# Patient Record
Sex: FEMALE | Race: BLACK OR AFRICAN AMERICAN | URBAN - METROPOLITAN AREA
[De-identification: names, ages, dates, MRNs, and addresses within clinical notes are randomized per-mention and may not be internally consistent; named-entity substitution may affect disease eponyms.]

---

## 2017-01-16 ENCOUNTER — APPOINTMENT (RX ONLY)
Dept: URBAN - METROPOLITAN AREA CLINIC 7 | Facility: CLINIC | Age: 37
Setting detail: DERMATOLOGY
End: 2017-01-16

## 2017-01-16 DIAGNOSIS — Z41.9 ENCOUNTER FOR PROCEDURE FOR PURPOSES OTHER THAN REMEDYING HEALTH STATE, UNSPECIFIED: ICD-10-CM

## 2017-01-16 PROCEDURE — ? SCITON

## 2017-01-16 PROCEDURE — ? MEDICAL CONSULTATION: LHR

## 2017-01-16 ASSESSMENT — LOCATION SIMPLE DESCRIPTION DERM: LOCATION SIMPLE: RIGHT AXILLARY VAULT

## 2017-01-16 ASSESSMENT — LOCATION ZONE DERM: LOCATION ZONE: AXILLAE

## 2017-01-16 ASSESSMENT — LOCATION DETAILED DESCRIPTION DERM: LOCATION DETAILED: RIGHT AXILLARY VAULT

## 2017-01-16 NOTE — PROCEDURE: SCITON
Lapg Scan Area (Mm2): 0
Post-Care Instructions: I reviewed with the patient in detail post-care instructions. Patient should stay away from the sun and wear sun protection until treated areas are fully healed.
Detail Level: Zone
Pulse Duration Units: milliseconds
Cooling (In C): 10
Treatment Number: 1
Pulse Duration: 45
Consent: Written consent obtained, risks reviewed including but not limited to crusting, scabbing, blistering, scarring, darker or lighter pigmentary change, bruising, and/or incomplete response.
Cooling ?: Yes
Prep Text: The patient's skin was prepped in the usual manner.
Fluence (J/Cm2): 50

## 2017-03-03 ENCOUNTER — APPOINTMENT (RX ONLY)
Dept: URBAN - METROPOLITAN AREA CLINIC 7 | Facility: CLINIC | Age: 37
Setting detail: DERMATOLOGY
End: 2017-03-03

## 2017-03-03 DIAGNOSIS — L70.8 OTHER ACNE: ICD-10-CM

## 2017-03-03 DIAGNOSIS — Z41.9 ENCOUNTER FOR PROCEDURE FOR PURPOSES OTHER THAN REMEDYING HEALTH STATE, UNSPECIFIED: ICD-10-CM

## 2017-03-03 DIAGNOSIS — L82.1 OTHER SEBORRHEIC KERATOSIS: ICD-10-CM

## 2017-03-03 PROBLEM — L70.0 ACNE VULGARIS: Status: ACTIVE | Noted: 2017-03-03

## 2017-03-03 PROBLEM — L20.84 INTRINSIC (ALLERGIC) ECZEMA: Status: ACTIVE | Noted: 2017-03-03

## 2017-03-03 PROBLEM — L85.3 XEROSIS CUTIS: Status: ACTIVE | Noted: 2017-03-03

## 2017-03-03 PROCEDURE — ? COUNSELING

## 2017-03-03 PROCEDURE — ? TREATMENT REGIMEN

## 2017-03-03 PROCEDURE — ? SCITON

## 2017-03-03 PROCEDURE — 99202 OFFICE O/P NEW SF 15 MIN: CPT

## 2017-03-03 PROCEDURE — ? OTHER

## 2017-03-03 PROCEDURE — ? PRESCRIPTION

## 2017-03-03 RX ORDER — TRETINOIN 0.5 MG/G
1 CREAM TOPICAL QHS
Qty: 1 | Refills: 3 | Status: ERX | COMMUNITY
Start: 2017-03-03

## 2017-03-03 RX ORDER — DAPSONE 50 MG/G
1 GEL TOPICAL QAM
Qty: 1 | Refills: 3 | Status: ERX | COMMUNITY
Start: 2017-03-03

## 2017-03-03 RX ADMIN — DAPSONE 1: 50 GEL TOPICAL at 22:59

## 2017-03-03 RX ADMIN — TRETINOIN 1: 0.5 CREAM TOPICAL at 22:29

## 2017-03-03 ASSESSMENT — LOCATION SIMPLE DESCRIPTION DERM
LOCATION SIMPLE: LEFT ANTERIOR NECK
LOCATION SIMPLE: RIGHT ANTERIOR NECK
LOCATION SIMPLE: RIGHT CHEEK
LOCATION SIMPLE: RIGHT AXILLARY VAULT
LOCATION SIMPLE: CHEST
LOCATION SIMPLE: LEFT CHEEK

## 2017-03-03 ASSESSMENT — LOCATION ZONE DERM
LOCATION ZONE: TRUNK
LOCATION ZONE: AXILLAE
LOCATION ZONE: NECK
LOCATION ZONE: FACE

## 2017-03-03 ASSESSMENT — LOCATION DETAILED DESCRIPTION DERM
LOCATION DETAILED: LEFT SUPERIOR MEDIAL BUCCAL CHEEK
LOCATION DETAILED: RIGHT SUPERIOR LATERAL BUCCAL CHEEK
LOCATION DETAILED: MIDDLE STERNUM
LOCATION DETAILED: RIGHT INFERIOR LATERAL NECK
LOCATION DETAILED: LEFT CLAVICULAR NECK
LOCATION DETAILED: RIGHT AXILLARY VAULT

## 2017-03-03 NOTE — PROCEDURE: SCITON
Power (W): 0
Detail Level: Zone
Pulse Duration Units: milliseconds
Treatment Number: 2
Pulse Duration: 45
Cooling (In C): 10
Consent: Written consent obtained, risks reviewed including but not limited to crusting, scabbing, blistering, scarring, darker or lighter pigmentary change, bruising, and/or incomplete response.
Passes (Optional): 1
Fluence (J/Cm2): 50
Cooling ?: Yes
Prep Text: The patient's skin was prepped in the usual manner.
Post-Care Instructions: I reviewed with the patient in detail post-care instructions. Patient should stay away from the sun and wear sun protection until treated areas are fully healed.

## 2017-03-03 NOTE — PROCEDURE: TREATMENT REGIMEN
Detail Level: Zone
Plan: Patient is to speak with OBGYN about OCP and we may consider spironolactone in the future
Otc Regimen: Panoxyl 4% face wash
Initiate Treatment: Aczone QAM and tretinoin QHS

## 2017-05-17 ENCOUNTER — APPOINTMENT (RX ONLY)
Dept: URBAN - METROPOLITAN AREA CLINIC 7 | Facility: CLINIC | Age: 37
Setting detail: DERMATOLOGY
End: 2017-05-17

## 2017-05-17 DIAGNOSIS — Z41.9 ENCOUNTER FOR PROCEDURE FOR PURPOSES OTHER THAN REMEDYING HEALTH STATE, UNSPECIFIED: ICD-10-CM

## 2017-05-17 PROCEDURE — ? SCITON

## 2017-05-17 ASSESSMENT — LOCATION DETAILED DESCRIPTION DERM: LOCATION DETAILED: RIGHT AXILLARY VAULT

## 2017-05-17 ASSESSMENT — LOCATION ZONE DERM: LOCATION ZONE: AXILLAE

## 2017-05-17 ASSESSMENT — LOCATION SIMPLE DESCRIPTION DERM: LOCATION SIMPLE: RIGHT AXILLARY VAULT

## 2017-05-17 NOTE — PROCEDURE: SCITON
Consent: Written consent obtained, risks reviewed including but not limited to crusting, scabbing, blistering, scarring, darker or lighter pigmentary change, bruising, and/or incomplete response.
Treatment Number: 2
Detail Level: Zone
Lapg Scan Area (Mm2): 0
Passes (Optional): 1
Cooling (In C): 10
Post-Care Instructions: I reviewed with the patient in detail post-care instructions. Patient should stay away from the sun and wear sun protection until treated areas are fully healed.
Cooling ?: Yes
Prep Text: The patient's skin was prepped in the usual manner.
Pulse Duration Units: milliseconds
Fluence (J/Cm2): 50
Pulse Duration: 40

## 2017-12-11 ENCOUNTER — TELEPHONE (OUTPATIENT)
Dept: OBSTETRICS AND GYNECOLOGY | Facility: CLINIC | Age: 37
End: 2017-12-11

## 2017-12-11 NOTE — TELEPHONE ENCOUNTER
Returned the pt's call to the clinic. Informed the pt that Dr. Perry doesn't have any appointments available for the month of December and that I see that she has an appointment scheduled with Dr. Sagastume on 12/22. Inquired if the pt would like to just keep the appointment with Dr. Sagastume since she will be able to see him sooner. Pt informed me that she would like to keep the appointment with Dr. Sagastume. Verbalized understanding with the pt. Call ended.

## 2017-12-11 NOTE — TELEPHONE ENCOUNTER
----- Message from Komal Alexander sent at 12/11/2017  8:43 AM CST -----  X_  1st Request  _  2nd Request  _  3rd Request        Who:DOUGLAS RUSSELL [[id number]]     Why: New pt Requesting a call back in regards to getting an appt for fibroids. Pt will be in town on 12/21 and would like to have an appt sometime that week if possible. First available appt not until 1/12. Please call to discuss.    What Number to Call Back:[phone]    When to Expect a call back: (Within 24 hours)    Please return the call at earliest convenience. Thanks!

## 2018-01-22 ENCOUNTER — TELEPHONE (OUTPATIENT)
Dept: OBSTETRICS AND GYNECOLOGY | Facility: CLINIC | Age: 38
End: 2018-01-22

## 2018-01-22 NOTE — TELEPHONE ENCOUNTER
Returned the pt's call to the clinic. Pt stated that she was inquiring about another appointment date but that she didn't want to cancel her scheduled appointment. Informed the pt that 2/1 at 10AM is no longer available and that she can be scheduled on 2/7 at 9:45AM. Pt agreed to the appointment time and inquired if she can be placed on the waiting list for a sooner appointment. Informed the pt that she is on the waiting list for a sooner appointment and that a letter reminder will be sent out. Pt verbalized understanding. Letter sent out.

## 2018-01-22 NOTE — TELEPHONE ENCOUNTER
----- Message from Emma Wright sent at 1/22/2018  3:57 PM CST -----  Contact: self  Patient is requesting a call back. Patient states she did NOT cancel her appointment on 2/1 and is requesting to be seen. Patient denies calling to cancel. Patient can be reached at [phone].

## 2018-02-07 ENCOUNTER — OFFICE VISIT (OUTPATIENT)
Dept: OBSTETRICS AND GYNECOLOGY | Facility: CLINIC | Age: 38
End: 2018-02-07
Attending: OBSTETRICS & GYNECOLOGY
Payer: COMMERCIAL

## 2018-02-07 ENCOUNTER — LAB VISIT (OUTPATIENT)
Dept: LAB | Facility: OTHER | Age: 38
End: 2018-02-07
Attending: OBSTETRICS & GYNECOLOGY
Payer: COMMERCIAL

## 2018-02-07 VITALS
HEIGHT: 67 IN | DIASTOLIC BLOOD PRESSURE: 72 MMHG | SYSTOLIC BLOOD PRESSURE: 124 MMHG | BODY MASS INDEX: 23.29 KG/M2 | WEIGHT: 148.38 LBS

## 2018-02-07 DIAGNOSIS — R30.0 DYSURIA: ICD-10-CM

## 2018-02-07 DIAGNOSIS — N92.0 MENORRHAGIA WITH REGULAR CYCLE: ICD-10-CM

## 2018-02-07 DIAGNOSIS — D50.0 IRON DEFICIENCY ANEMIA DUE TO CHRONIC BLOOD LOSS: ICD-10-CM

## 2018-02-07 DIAGNOSIS — D25.1 INTRAMURAL AND SUBSEROUS LEIOMYOMA OF UTERUS: Primary | ICD-10-CM

## 2018-02-07 DIAGNOSIS — D25.2 INTRAMURAL AND SUBSEROUS LEIOMYOMA OF UTERUS: Primary | ICD-10-CM

## 2018-02-07 LAB
BACTERIA #/AREA URNS AUTO: NORMAL /HPF
BASOPHILS # BLD AUTO: 0.01 K/UL
BASOPHILS NFR BLD: 0.2 %
BILIRUB UR QL STRIP: NEGATIVE
CAOX CRY UR QL COMP ASSIST: NORMAL
CLARITY UR REFRACT.AUTO: ABNORMAL
COLOR UR AUTO: YELLOW
DIFFERENTIAL METHOD: ABNORMAL
EOSINOPHIL # BLD AUTO: 0 K/UL
EOSINOPHIL NFR BLD: 1 %
ERYTHROCYTE [DISTWIDTH] IN BLOOD BY AUTOMATED COUNT: 20.1 %
GLUCOSE UR QL STRIP: NEGATIVE
HCT VFR BLD AUTO: 34.9 %
HGB BLD-MCNC: 11.1 G/DL
HGB UR QL STRIP: NEGATIVE
HYALINE CASTS UR QL AUTO: 0 /LPF
KETONES UR QL STRIP: NEGATIVE
LEUKOCYTE ESTERASE UR QL STRIP: NEGATIVE
LYMPHOCYTES # BLD AUTO: 2 K/UL
LYMPHOCYTES NFR BLD: 48.3 %
MCH RBC QN AUTO: 26.7 PG
MCHC RBC AUTO-ENTMCNC: 31.8 G/DL
MCV RBC AUTO: 84 FL
MICROSCOPIC COMMENT: NORMAL
MONOCYTES # BLD AUTO: 0.5 K/UL
MONOCYTES NFR BLD: 10.8 %
NEUTROPHILS # BLD AUTO: 1.7 K/UL
NEUTROPHILS NFR BLD: 39.7 %
NITRITE UR QL STRIP: NEGATIVE
PH UR STRIP: 5 [PH] (ref 5–8)
PLATELET # BLD AUTO: 300 K/UL
PMV BLD AUTO: 10.2 FL
PROT UR QL STRIP: ABNORMAL
RBC # BLD AUTO: 4.15 M/UL
RBC #/AREA URNS AUTO: 0 /HPF (ref 0–4)
SP GR UR STRIP: 1.02 (ref 1–1.03)
SQUAMOUS #/AREA URNS AUTO: 0 /HPF
TSH SERPL DL<=0.005 MIU/L-ACNC: 2.78 UIU/ML
URATE CRY UR QL COMP ASSIST: NORMAL
URN SPEC COLLECT METH UR: ABNORMAL
UROBILINOGEN UR STRIP-ACNC: NEGATIVE EU/DL
WBC # BLD AUTO: 4.18 K/UL
WBC #/AREA URNS AUTO: 1 /HPF (ref 0–5)

## 2018-02-07 PROCEDURE — 85025 COMPLETE CBC W/AUTO DIFF WBC: CPT

## 2018-02-07 PROCEDURE — 36415 COLL VENOUS BLD VENIPUNCTURE: CPT

## 2018-02-07 PROCEDURE — 99204 OFFICE O/P NEW MOD 45 MIN: CPT | Mod: S$GLB,,, | Performed by: OBSTETRICS & GYNECOLOGY

## 2018-02-07 PROCEDURE — 81001 URINALYSIS AUTO W/SCOPE: CPT

## 2018-02-07 PROCEDURE — 99999 PR PBB SHADOW E&M-EST. PATIENT-LVL III: CPT | Mod: PBBFAC,,, | Performed by: OBSTETRICS & GYNECOLOGY

## 2018-02-07 PROCEDURE — 84443 ASSAY THYROID STIM HORMONE: CPT

## 2018-02-07 PROCEDURE — 87086 URINE CULTURE/COLONY COUNT: CPT

## 2018-02-07 PROCEDURE — 3008F BODY MASS INDEX DOCD: CPT | Mod: S$GLB,,, | Performed by: OBSTETRICS & GYNECOLOGY

## 2018-02-07 NOTE — PROGRESS NOTES
"CC: Symptoms related to fibroids    Kathe Hudson is a 37 y.o. female  presents for a consultation for management of fibroids.      Patient was found to have a fibroid on ultrasound after being unable to lose weight in her abdomen.  She reports cycles are heavy on the first 2 days.  She uses pads and has to change them every hour.  She reports clots as well.  She is anemic and is currently taking iron once a day.  She reports moderate to severe pain with menses.  She reports constipation as well.       Records from previous physician have been sent but not received.     Patient desires future fertility.    History reviewed. No pertinent past medical history.    History reviewed. No pertinent surgical history.    Family History   Problem Relation Age of Onset    Breast cancer Maternal Grandmother     Colon cancer Maternal Grandmother     Ovarian cancer Neg Hx        Social History   Substance Use Topics    Smoking status: Never Smoker    Smokeless tobacco: Never Used    Alcohol use No       OB History    Para Term  AB Living   0 0 0 0 0 0   SAB TAB Ectopic Multiple Live Births   0 0 0 0 0             /72 (BP Location: Right arm, Patient Position: Sitting, BP Method: Large (Manual))   Ht 5' 7" (1.702 m)   Wt 67.3 kg (148 lb 5.9 oz)   LMP 2018 (Exact Date)   BMI 23.24 kg/m²     ROS:  GENERAL: Denies weight gain or weight loss. Feeling well overall.   SKIN: Denies rash or lesions.   HEAD: Denies head injury or headache.   NODES: Denies enlarged lymph nodes.   CHEST: Denies chest pain or shortness of breath.   CARDIOVASCULAR: Denies palpitations or left sided chest pain.   ABDOMEN: No abdominal pain, constipation, diarrhea, nausea, vomiting or rectal bleeding.   URINARY: No frequency, dysuria, hematuria, or burning on urination.  REPRODUCTIVE: See HPI.   HEMATOLOGIC: No easy bruisability or excessive bleeding with the exception of menstrual cycles.  MUSCULOSKELETAL: " Denies joint pain or swelling.   NEUROLOGIC: Denies syncope or weakness.   PSYCHIATRIC: Denies depression, anxiety or mood swings.    PHYSICAL EXAM:    APPEARANCE: Well nourished, well developed, in no acute distress.  AFFECT: WNL, alert and oriented x 3  SKIN: No acne or hirsutism  NECK: Neck symmetric without masses or thyromegaly  NODES: No inguinal, cervical, axillary, or femoral lymph node enlargement  CHEST: Good respiratory effect  ABDOMEN: Soft.  No tenderness or masses.  No hepatosplenomegaly.  No hernias.  BREASTS: Symmetrical, no skin changes or visible lesions.  No palpable masses, nipple discharge bilaterally.  PELVIC: Normal external genitalia without lesions.  Normal hair distribution.  Adequate perineal body, normal urethral meatus.  Vagina moist and well rugated without lesions or discharge.  Cervix pink, without lesions, discharge or tenderness.  No significant cystocele or rectocele.  Bimanual exam shows uterus to be normal size, regular, mobile and nontender.  Adnexa without masses or tenderness.    EXTREMITIES: No edema.      ICD-10-CM ICD-9-CM    1. Intramural and subserous leiomyoma of uterus D25.1 218.1 MRI Pelvis W WO Contrast    D25.2 218.2    2. Menorrhagia with regular cycle N92.0 626.2 TSH      CBC auto differential   3. Dysuria R30.0 788.1 Urine culture      Urinalysis   4. Iron deficiency anemia due to chronic blood loss D50.0 280.0          Patient was counseled today on treatment options (risks/benefits) for fibroids including low dose oral contraceptive pills, Lysteda, DepoLupron, UFE, myomectomy, and hysterectomy.  Patient desires a myomectomy.  Discussed the routes - robotic assisted laparoscopic vs. Abdominal.  Discussed the benefits and limitations of a minimally invasive approach.  MRI ordered to determine if patient is a candidate.      Labs ordered to determine status of anemia.

## 2018-02-08 ENCOUNTER — PATIENT MESSAGE (OUTPATIENT)
Dept: OBSTETRICS AND GYNECOLOGY | Facility: CLINIC | Age: 38
End: 2018-02-08

## 2018-02-09 LAB — BACTERIA UR CULT: NO GROWTH

## 2018-02-14 ENCOUNTER — TELEPHONE (OUTPATIENT)
Dept: OBSTETRICS AND GYNECOLOGY | Facility: CLINIC | Age: 38
End: 2018-02-14

## 2018-02-14 DIAGNOSIS — N92.0 MENORRHAGIA: ICD-10-CM

## 2018-02-14 DIAGNOSIS — N92.1 MENORRHAGIA WITH IRREGULAR CYCLE: Primary | ICD-10-CM

## 2018-02-14 RX ORDER — SODIUM CHLORIDE 9 MG/ML
INJECTION, SOLUTION INTRAVENOUS CONTINUOUS
Status: CANCELLED | OUTPATIENT
Start: 2018-02-14

## 2018-02-14 NOTE — TELEPHONE ENCOUNTER
Returned the pt's call to the clinic. No answer, left VM message for the pt to call the clinic back.

## 2018-02-14 NOTE — TELEPHONE ENCOUNTER
----- Message from Mary Alexander sent at 2/14/2018  8:44 AM CST -----  Contact: DOUGLAS RUSSELL [[id number]]  x_  1st Request  _  2nd Request  _  3rd Request        Who: DOUGLAS RUSSELL [[id number]]    Why: Requesting a call back in regards to a call from the office, please call her back.    What Number to Call Back: [phone]    When to Expect a call back: (Within 24 hours)    Please return the call at earliest convenience. Thanks!

## 2018-02-14 NOTE — TELEPHONE ENCOUNTER
Returned the pt's call to the clinic. Pt inquiring if February 19th still works as a date for her Sx. Pt stated that she can have the procedure done on 2/19. Informed the pt that a message will be sent to Dr. Perry to inquire if the date is still available and that she will be contacted once Dr. Perry responds. Pt verbalized understanding.        Taylor was already contacted in Sx scheduling about you following yourself on 2/19 and stated that you just need to place the orders.

## 2018-02-14 NOTE — TELEPHONE ENCOUNTER
----- Message from Delia Mcmillan MA sent at 2/14/2018  8:23 AM CST -----  Contact: DOUGLAS RUSSELL [[id number]]  Please advise    ----- Message -----  From: Suly Greyuel  Sent: 2/14/2018   8:01 AM  To: Kristal Almazan    x_  1st Request  _  2nd Request  _  3rd Request        Who: DOUGLAS RUSSELL [[id number]]    Why: Patient states she has questions about an upcoming surgery that she is having.     What Number to Call Back: [phone]    When to Expect a call back: (Before the end of the day)   -- if call after 3:00 call back will be tomorrow.

## 2018-02-15 ENCOUNTER — TELEPHONE (OUTPATIENT)
Dept: OBSTETRICS AND GYNECOLOGY | Facility: CLINIC | Age: 38
End: 2018-02-15

## 2018-02-15 NOTE — TELEPHONE ENCOUNTER
Contacted the pt to inquire if she can come in for an appointment with Dr. Perry to go over consents and meet with anesthesia. Offered the pt an appointment for 1:30PM. Pt agreed to the appointment and inquired about the blood she has seen in her stool. Informed the pt that she can discuss it with Dr. Perry on her visit tomorrow. Pt verbalized understanding.

## 2018-02-16 ENCOUNTER — OFFICE VISIT (OUTPATIENT)
Dept: OBSTETRICS AND GYNECOLOGY | Facility: CLINIC | Age: 38
End: 2018-02-16
Attending: OBSTETRICS & GYNECOLOGY
Payer: COMMERCIAL

## 2018-02-16 ENCOUNTER — ANESTHESIA EVENT (OUTPATIENT)
Dept: SURGERY | Facility: OTHER | Age: 38
End: 2018-02-16
Payer: COMMERCIAL

## 2018-02-16 ENCOUNTER — HOSPITAL ENCOUNTER (OUTPATIENT)
Dept: PREADMISSION TESTING | Facility: OTHER | Age: 38
Discharge: HOME | End: 2018-02-16
Attending: OBSTETRICS & GYNECOLOGY
Payer: COMMERCIAL

## 2018-02-16 VITALS
HEIGHT: 67 IN | WEIGHT: 148.81 LBS | BODY MASS INDEX: 23.36 KG/M2 | DIASTOLIC BLOOD PRESSURE: 76 MMHG | SYSTOLIC BLOOD PRESSURE: 130 MMHG

## 2018-02-16 VITALS
SYSTOLIC BLOOD PRESSURE: 131 MMHG | HEART RATE: 63 BPM | WEIGHT: 148.81 LBS | OXYGEN SATURATION: 100 % | TEMPERATURE: 100 F | BODY MASS INDEX: 23.36 KG/M2 | DIASTOLIC BLOOD PRESSURE: 73 MMHG | HEIGHT: 67 IN

## 2018-02-16 DIAGNOSIS — D25.2 INTRAMURAL AND SUBSEROUS LEIOMYOMA OF UTERUS: ICD-10-CM

## 2018-02-16 DIAGNOSIS — N92.1 MENORRHAGIA WITH IRREGULAR CYCLE: ICD-10-CM

## 2018-02-16 DIAGNOSIS — Z01.818 PREOPERATIVE EXAM FOR GYNECOLOGIC SURGERY: Primary | ICD-10-CM

## 2018-02-16 DIAGNOSIS — D25.1 INTRAMURAL AND SUBSEROUS LEIOMYOMA OF UTERUS: ICD-10-CM

## 2018-02-16 DIAGNOSIS — D50.0 IRON DEFICIENCY ANEMIA DUE TO CHRONIC BLOOD LOSS: ICD-10-CM

## 2018-02-16 LAB
ABO + RH BLD: NORMAL
BASOPHILS # BLD AUTO: 0.01 K/UL
BASOPHILS NFR BLD: 0.2 %
BLD GP AB SCN CELLS X3 SERPL QL: NORMAL
DIFFERENTIAL METHOD: ABNORMAL
EOSINOPHIL # BLD AUTO: 0 K/UL
EOSINOPHIL NFR BLD: 0.9 %
ERYTHROCYTE [DISTWIDTH] IN BLOOD BY AUTOMATED COUNT: 19.7 %
HCT VFR BLD AUTO: 38.1 %
HGB BLD-MCNC: 12.1 G/DL
LYMPHOCYTES # BLD AUTO: 2 K/UL
LYMPHOCYTES NFR BLD: 43.7 %
MCH RBC QN AUTO: 26.7 PG
MCHC RBC AUTO-ENTMCNC: 31.8 G/DL
MCV RBC AUTO: 84 FL
MONOCYTES # BLD AUTO: 0.4 K/UL
MONOCYTES NFR BLD: 9 %
NEUTROPHILS # BLD AUTO: 2.2 K/UL
NEUTROPHILS NFR BLD: 46 %
PLATELET # BLD AUTO: 277 K/UL
PMV BLD AUTO: 9.5 FL
RBC # BLD AUTO: 4.54 M/UL
WBC # BLD AUTO: 4.67 K/UL

## 2018-02-16 PROCEDURE — 99999 PR PBB SHADOW E&M-EST. PATIENT-LVL II: CPT | Mod: PBBFAC,,, | Performed by: OBSTETRICS & GYNECOLOGY

## 2018-02-16 PROCEDURE — 36415 COLL VENOUS BLD VENIPUNCTURE: CPT

## 2018-02-16 PROCEDURE — 86850 RBC ANTIBODY SCREEN: CPT

## 2018-02-16 PROCEDURE — 99212 OFFICE O/P EST SF 10 MIN: CPT | Mod: PBBFAC,25 | Performed by: OBSTETRICS & GYNECOLOGY

## 2018-02-16 PROCEDURE — 99499 UNLISTED E&M SERVICE: CPT | Mod: S$PBB,,, | Performed by: OBSTETRICS & GYNECOLOGY

## 2018-02-16 PROCEDURE — 85025 COMPLETE CBC W/AUTO DIFF WBC: CPT

## 2018-02-16 RX ORDER — FAMOTIDINE 20 MG/1
20 TABLET, FILM COATED ORAL
Status: CANCELLED | OUTPATIENT
Start: 2018-02-16 | End: 2018-02-16

## 2018-02-16 RX ORDER — SODIUM CHLORIDE, SODIUM LACTATE, POTASSIUM CHLORIDE, CALCIUM CHLORIDE 600; 310; 30; 20 MG/100ML; MG/100ML; MG/100ML; MG/100ML
INJECTION, SOLUTION INTRAVENOUS CONTINUOUS
Status: CANCELLED | OUTPATIENT
Start: 2018-02-16

## 2018-02-16 RX ORDER — MIDAZOLAM HYDROCHLORIDE 5 MG/ML
4 INJECTION INTRAMUSCULAR; INTRAVENOUS
Status: CANCELLED | OUTPATIENT
Start: 2018-02-16

## 2018-02-16 RX ORDER — OXYCODONE HYDROCHLORIDE 5 MG/1
5 TABLET ORAL ONCE AS NEEDED
Status: CANCELLED | OUTPATIENT
Start: 2018-02-16 | End: 2018-02-16

## 2018-02-16 RX ORDER — LIDOCAINE HYDROCHLORIDE 10 MG/ML
0.5 INJECTION, SOLUTION EPIDURAL; INFILTRATION; INTRACAUDAL; PERINEURAL ONCE
Status: CANCELLED | OUTPATIENT
Start: 2018-02-16 | End: 2018-02-16

## 2018-02-16 NOTE — ANESTHESIA PREPROCEDURE EVALUATION
02/16/2018  Kathe Hudson is a 37 y.o., female.    Anesthesia Evaluation    I have reviewed the Patient Summary Reports.    I have reviewed the Nursing Notes.   I have reviewed the Medications.     Review of Systems  Anesthesia Hx:  No previous Anesthesia    Social:  Non-Smoker, No Alcohol Use    Hematology/Oncology:     Oncology Normal    -- Anemia:   EENT/Dental:EENT/Dental Normal   Cardiovascular:  Cardiovascular Normal Exercise tolerance: good     Pulmonary:  Pulmonary Normal    Renal/:  Renal/ Normal     Hepatic/GI:  Hepatic/GI Normal    Musculoskeletal:  Musculoskeletal Normal    Neurological:  Neurology Normal    Endocrine:  Endocrine Normal    Dermatological:  Skin Normal    Psych:  Psychiatric Normal           Physical Exam  General:  Well nourished    Airway/Jaw/Neck:  Airway Findings: Mouth Opening: Normal Tongue: Normal  General Airway Assessment: Adult, Good  Mallampati: I  TM Distance: Normal, at least 6 cm  Jaw/Neck Findings:  Neck ROM: Normal ROM      Dental:  Dental Findings: In tact, lower retainer        Mental Status:  Mental Status Findings:  Cooperative, Alert and Oriented         Anesthesia Plan  Type of Anesthesia, risks & benefits discussed:  Anesthesia Type:  general  Patient's Preference:   Intra-op Monitoring Plan: standard ASA monitors  Intra-op Monitoring Plan Comments:   Post Op Pain Control Plan:   Post Op Pain Control Plan Comments:   Induction:    Beta Blocker:         Informed Consent: Patient understands risks and agrees with Anesthesia plan.  Questions answered. Anesthesia consent signed with patient.  ASA Score: 2     Day of Surgery Review of History & Physical:    H&P update referred to the surgeon.     Anesthesia Plan Notes: CBC and T&S.        Ready For Surgery From Anesthesia Perspective.

## 2018-02-16 NOTE — DISCHARGE INSTRUCTIONS
PRE-ADMIT TESTING -  [phone]    [address]  [place] [facility]          Your surgery has been scheduled at [facility]. We are pleased to have the opportunity to serve you. For Further Information please call [phone].    On the day of surgery please report to the Information Desk on the 1st floor.    · CONTACT YOUR PHYSICIAN'S OFFICE THE DAY PRIOR TO YOUR SURGERY TO OBTAIN YOUR ARRIVAL TIME.     · The evening before surgery do not eat anything after 9 p.m. ( this includes hard candy, chewing gum and mints).  You may only have GATORADE, POWERADE AND WATER  from 9 p.m. until you leave your home.   DO NOT DRINK ANY LIQUIDS ON THE WAY TO THE HOSPITAL.      SPECIAL MEDICATION INSTRUCTIONS: TAKE medications checked off by the Anesthesiologist on your Medication List.    Angiogram Patients: Take medications as instructed by your physician, including aspirin.     Surgery Patients:    If you take ASPIRIN - Your PHYSICIAN/SURGEON will need to inform you IF/OR when you need to stop taking aspirin prior to your surgery.     Do Not take any medications containing IBUPROFEN.  Do Not Wear any make-up or dark nail polish   (especially eye make-up) to surgery. If you come to surgery with makeup on you will be required to remove the makeup or nail polish.    Do not shave your surgical area at least 5 days prior to your surgery. The surgical prep will be performed at the hospital according to Infection Control regulations.    Leave all valuables at home.   Do Not wear any jewelry or watches, including any metal in body piercings.  Contact Lens must be removed before surgery. Either do not wear the contact lens or bring a case and solution for storage.  Please bring a container for eyeglasses or dentures as required.  Bring any paperwork your physician has provided, such as consent forms,  history and physicals, doctor's orders, etc.   Bring comfortable clothes that are loose fitting to wear upon  discharge. Take into consideration the type of surgery being performed.  Maintain your diet as advised per your physician the day prior to surgery.      Adequate rest the night before surgery is advised.   Park in the Parking lot behind the hospital or in the [facility] across the street from the parking lot. Parking is complimentary.  If you will be discharged the same day as your procedure, please arrange for a responsible adult to drive you home or to accompany you if traveling by taxi.   YOU WILL NOT BE PERMITTED TO DRIVE OR TO LEAVE THE HOSPITAL ALONE AFTER SURGERY.   It is strongly recommended that you arrange for someone to remain with you for the first 24 hrs following your surgery.       Thank you for your cooperation.  The Staff of [facility].        Bathing Instructions                                                                 Please shower the evening before and morning of your procedure with    ANTIBACTERIAL SOAP. ( DIAL, etc )  Concentrate on the surgical area   for at least 3 minutes and rinse completely. Dry off as usual.   Do not use any deodorant, powder, body lotions, perfume, after shave or    cologne.

## 2018-02-16 NOTE — PROGRESS NOTES
"CC: Preop exam    Kathe Hudson is a 37 y.o. female  presents for a pre-op exam for an abdominal myomectomy scheduled on .      Patient was found to have a fibroid on ultrasound after being unable to lose weight in her abdomen.  She reports cycles are heavy on the first 2 days.  She uses pads and has to change them every hour.  She reports clots as well. She is anemic and is currently taking iron once a day.  She reports moderate to severe pain with menses.  She reports constipation as well.       Records from previous physician have been reviewed     Patient desires future fertility.    History reviewed. No pertinent past medical history.    History reviewed. No pertinent surgical history.    OB History    Para Term  AB Living   0 0 0 0 0 0   SAB TAB Ectopic Multiple Live Births   0 0 0 0 0             Family History   Problem Relation Age of Onset    Breast cancer Maternal Grandmother     Colon cancer Maternal Grandmother     Ovarian cancer Neg Hx        Social History   Substance Use Topics    Smoking status: Never Smoker    Smokeless tobacco: Never Used    Alcohol use No       /76   Ht 5' 7" (1.702 m)   Wt 67.5 kg (148 lb 13 oz)   LMP 2018 (Exact Date)   BMI 23.31 kg/m²     ROS:  GENERAL: Denies weight gain or weight loss. Feeling well overall.   SKIN: Denies rash or lesions.   HEAD: Denies head injury or headache.   NODES: Denies enlarged lymph nodes.   CHEST: Denies chest pain or shortness of breath.   CARDIOVASCULAR: Denies palpitations or left sided chest pain.   ABDOMEN: No abdominal pain, constipation, diarrhea, nausea, vomiting or rectal bleeding.   URINARY: No frequency, dysuria, hematuria, or burning on urination.  REPRODUCTIVE: See HPI.   HEMATOLOGIC: No easy bruisability or excessive bleeding with the exception of menstrual cycles.  MUSCULOSKELETAL: Denies joint pain or swelling.   NEUROLOGIC: Denies syncope or weakness. "   PSYCHIATRIC: Denies depression, anxiety or mood swings.    APPEARANCE: Well nourished, well developed, in no acute distress.  AFFECT: WNL, alert and oriented x 3  SKIN: No acne or hirsutism  NECK: Neck symmetric without masses or thyromegaly  NODES: No inguinal, cervical, axillary, or femoral lymph node enlargement  CHEST: Good respiratory effect  ABDOMEN: Soft.  No tenderness or masses.  No hepatosplenomegaly.  No hernias.  PELVIC: Normal external genitalia without lesions.  Normal hair distribution.  Adequate perineal body, normal urethral meatus.  Vagina moist and well rugated without lesions or discharge.  Cervix pink, without lesions, discharge or tenderness.  No significant cystocele or rectocele.  Bimanual exam shows uterus to be normal size, regular, mobile and nontender.  Adnexa without masses or tenderness.    RECTAL: Rectovaginal exam confirms above with normal sphincter tone, no masses.  EXTREMITIES: No edema.      ICD-10-CM ICD-9-CM    1. Preoperative exam for gynecologic surgery Z01.818 V72.83    2. Intramural and subserous leiomyoma of uterus D25.1 218.1     D25.2 218.2    3. Iron deficiency anemia due to chronic blood loss D50.0 280.0    4. Menorrhagia with irregular cycle N92.1 626.2          Patient was counseled today on treatment options (risks/benefits) for fibroids including low dose oral contraceptive pills, Lysteda, DepoLupron, UFE, myomectomy, and hysterectomy.  Patient desires a myomectomy.  Discussed the routes - robotic assisted laparoscopic vs. Abdominal.  Discussed that based on the ultrasound, the largest fibroid is greater than 10 cm so she is not a candidate for a robotic assisted approach.     Discussed blood in her stool is most likely due to severe constipation.  However, if bleeding continues after surgery, recommended f/u with PCP for colonoscopy    I have discussed the risks, benefits, indications, and alternatives of the procedure in detail.  The patient verbalizes her  understanding.  All questions answered.  Consents signed.  The patient agrees to proceed to proceed as planned.

## 2018-02-19 ENCOUNTER — HOSPITAL ENCOUNTER (OUTPATIENT)
Facility: OTHER | Age: 38
Discharge: HOME | End: 2018-02-20
Attending: OBSTETRICS & GYNECOLOGY | Admitting: OBSTETRICS & GYNECOLOGY
Payer: COMMERCIAL

## 2018-02-19 ENCOUNTER — ANESTHESIA (OUTPATIENT)
Dept: SURGERY | Facility: OTHER | Age: 38
End: 2018-02-19
Payer: COMMERCIAL

## 2018-02-19 ENCOUNTER — SURGERY (OUTPATIENT)
Age: 38
End: 2018-02-19

## 2018-02-19 DIAGNOSIS — Z98.890 S/P MYOMECTOMY: ICD-10-CM

## 2018-02-19 DIAGNOSIS — N92.0 MENORRHAGIA: ICD-10-CM

## 2018-02-19 DIAGNOSIS — N92.1 MENORRHAGIA WITH IRREGULAR CYCLE: ICD-10-CM

## 2018-02-19 LAB
B-HCG UR QL: NEGATIVE
CTP QC/QA: YES
POCT GLUCOSE: 82 MG/DL (ref 70–110)

## 2018-02-19 PROCEDURE — 25000003 PHARM REV CODE 250: Performed by: OBSTETRICS & GYNECOLOGY

## 2018-02-19 PROCEDURE — C1765 ADHESION BARRIER: HCPCS | Performed by: OBSTETRICS & GYNECOLOGY

## 2018-02-19 PROCEDURE — 36000707: Performed by: OBSTETRICS & GYNECOLOGY

## 2018-02-19 PROCEDURE — 88305 TISSUE EXAM BY PATHOLOGIST: CPT | Performed by: PATHOLOGY

## 2018-02-19 PROCEDURE — 71000033 HC RECOVERY, INTIAL HOUR: Performed by: OBSTETRICS & GYNECOLOGY

## 2018-02-19 PROCEDURE — P9045 ALBUMIN (HUMAN), 5%, 250 ML: HCPCS | Mod: JG | Performed by: NURSE ANESTHETIST, CERTIFIED REGISTERED

## 2018-02-19 PROCEDURE — 63600175 PHARM REV CODE 636 W HCPCS: Performed by: NURSE ANESTHETIST, CERTIFIED REGISTERED

## 2018-02-19 PROCEDURE — 63600175 PHARM REV CODE 636 W HCPCS: Performed by: OBSTETRICS & GYNECOLOGY

## 2018-02-19 PROCEDURE — 37000008 HC ANESTHESIA 1ST 15 MINUTES: Performed by: OBSTETRICS & GYNECOLOGY

## 2018-02-19 PROCEDURE — 25000003 PHARM REV CODE 250: Performed by: SPECIALIST

## 2018-02-19 PROCEDURE — 99900035 HC TECH TIME PER 15 MIN (STAT)

## 2018-02-19 PROCEDURE — 88305 TISSUE EXAM BY PATHOLOGIST: CPT | Mod: 26,,, | Performed by: PATHOLOGY

## 2018-02-19 PROCEDURE — 36000706: Performed by: OBSTETRICS & GYNECOLOGY

## 2018-02-19 PROCEDURE — 88331 PATH CONSLTJ SURG 1 BLK 1SPC: CPT | Mod: 26,,, | Performed by: PATHOLOGY

## 2018-02-19 PROCEDURE — 63600175 PHARM REV CODE 636 W HCPCS: Performed by: SPECIALIST

## 2018-02-19 PROCEDURE — 58140 MYOMECTOMY ABDOM METHOD: CPT | Mod: ,,, | Performed by: OBSTETRICS & GYNECOLOGY

## 2018-02-19 PROCEDURE — 25000003 PHARM REV CODE 250: Performed by: NURSE ANESTHETIST, CERTIFIED REGISTERED

## 2018-02-19 PROCEDURE — 88331 PATH CONSLTJ SURG 1 BLK 1SPC: CPT | Performed by: PATHOLOGY

## 2018-02-19 PROCEDURE — 37000009 HC ANESTHESIA EA ADD 15 MINS: Performed by: OBSTETRICS & GYNECOLOGY

## 2018-02-19 PROCEDURE — 27201423 OPTIME MED/SURG SUP & DEVICES STERILE SUPPLY: Performed by: OBSTETRICS & GYNECOLOGY

## 2018-02-19 PROCEDURE — 71000039 HC RECOVERY, EACH ADD'L HOUR: Performed by: OBSTETRICS & GYNECOLOGY

## 2018-02-19 PROCEDURE — 82962 GLUCOSE BLOOD TEST: CPT | Performed by: OBSTETRICS & GYNECOLOGY

## 2018-02-19 PROCEDURE — 88342 IMHCHEM/IMCYTCHM 1ST ANTB: CPT | Mod: 26,,, | Performed by: PATHOLOGY

## 2018-02-19 PROCEDURE — 88341 IMHCHEM/IMCYTCHM EA ADD ANTB: CPT | Mod: 26,,, | Performed by: PATHOLOGY

## 2018-02-19 PROCEDURE — 81025 URINE PREGNANCY TEST: CPT | Performed by: SPECIALIST

## 2018-02-19 RX ORDER — DIPHENHYDRAMINE HYDROCHLORIDE 50 MG/ML
25 INJECTION INTRAMUSCULAR; INTRAVENOUS EVERY 6 HOURS PRN
Status: DISCONTINUED | OUTPATIENT
Start: 2018-02-19 | End: 2018-02-19 | Stop reason: HOSPADM

## 2018-02-19 RX ORDER — KETOROLAC TROMETHAMINE 30 MG/ML
INJECTION, SOLUTION INTRAMUSCULAR; INTRAVENOUS
Status: DISCONTINUED | OUTPATIENT
Start: 2018-02-19 | End: 2018-02-19

## 2018-02-19 RX ORDER — HYDROCODONE BITARTRATE AND ACETAMINOPHEN 10; 325 MG/1; MG/1
1 TABLET ORAL EVERY 4 HOURS PRN
Status: DISCONTINUED | OUTPATIENT
Start: 2018-02-19 | End: 2018-02-20 | Stop reason: HOSPADM

## 2018-02-19 RX ORDER — FENTANYL CITRATE 50 UG/ML
25 INJECTION, SOLUTION INTRAMUSCULAR; INTRAVENOUS EVERY 5 MIN PRN
Status: COMPLETED | OUTPATIENT
Start: 2018-02-19 | End: 2018-02-19

## 2018-02-19 RX ORDER — LIDOCAINE HYDROCHLORIDE 10 MG/ML
0.5 INJECTION, SOLUTION EPIDURAL; INFILTRATION; INTRACAUDAL; PERINEURAL ONCE
Status: DISCONTINUED | OUTPATIENT
Start: 2018-02-19 | End: 2018-02-19 | Stop reason: HOSPADM

## 2018-02-19 RX ORDER — HYDROCODONE BITARTRATE AND ACETAMINOPHEN 5; 325 MG/1; MG/1
1 TABLET ORAL EVERY 4 HOURS PRN
Status: DISCONTINUED | OUTPATIENT
Start: 2018-02-19 | End: 2018-02-20 | Stop reason: HOSPADM

## 2018-02-19 RX ORDER — ONDANSETRON 2 MG/ML
INJECTION INTRAMUSCULAR; INTRAVENOUS
Status: DISCONTINUED | OUTPATIENT
Start: 2018-02-19 | End: 2018-02-19

## 2018-02-19 RX ORDER — SODIUM CHLORIDE AND POTASSIUM CHLORIDE 150; 900 MG/100ML; MG/100ML
INJECTION, SOLUTION INTRAVENOUS CONTINUOUS
Status: DISCONTINUED | OUTPATIENT
Start: 2018-02-19 | End: 2018-02-20

## 2018-02-19 RX ORDER — SODIUM CHLORIDE, SODIUM LACTATE, POTASSIUM CHLORIDE, CALCIUM CHLORIDE 600; 310; 30; 20 MG/100ML; MG/100ML; MG/100ML; MG/100ML
INJECTION, SOLUTION INTRAVENOUS CONTINUOUS
Status: DISCONTINUED | OUTPATIENT
Start: 2018-02-19 | End: 2018-02-19

## 2018-02-19 RX ORDER — ONDANSETRON 2 MG/ML
4 INJECTION INTRAMUSCULAR; INTRAVENOUS DAILY PRN
Status: DISCONTINUED | OUTPATIENT
Start: 2018-02-19 | End: 2018-02-19 | Stop reason: HOSPADM

## 2018-02-19 RX ORDER — DIPHENHYDRAMINE HYDROCHLORIDE 50 MG/ML
INJECTION INTRAMUSCULAR; INTRAVENOUS
Status: DISCONTINUED | OUTPATIENT
Start: 2018-02-19 | End: 2018-02-19

## 2018-02-19 RX ORDER — KETOROLAC TROMETHAMINE 30 MG/ML
30 INJECTION, SOLUTION INTRAMUSCULAR; INTRAVENOUS EVERY 6 HOURS
Status: DISCONTINUED | OUTPATIENT
Start: 2018-02-19 | End: 2018-02-20 | Stop reason: HOSPADM

## 2018-02-19 RX ORDER — MIDAZOLAM HYDROCHLORIDE 5 MG/ML
4 INJECTION INTRAMUSCULAR; INTRAVENOUS
Status: DISCONTINUED | OUTPATIENT
Start: 2018-02-19 | End: 2018-02-19 | Stop reason: HOSPADM

## 2018-02-19 RX ORDER — LIDOCAINE HCL/PF 100 MG/5ML
SYRINGE (ML) INTRAVENOUS
Status: DISCONTINUED | OUTPATIENT
Start: 2018-02-19 | End: 2018-02-19

## 2018-02-19 RX ORDER — ACETAMINOPHEN 10 MG/ML
INJECTION, SOLUTION INTRAVENOUS
Status: DISCONTINUED | OUTPATIENT
Start: 2018-02-19 | End: 2018-02-19

## 2018-02-19 RX ORDER — NEOSTIGMINE METHYLSULFATE 1 MG/ML
INJECTION, SOLUTION INTRAVENOUS
Status: DISCONTINUED | OUTPATIENT
Start: 2018-02-19 | End: 2018-02-19

## 2018-02-19 RX ORDER — FAMOTIDINE 20 MG/1
20 TABLET, FILM COATED ORAL
Status: COMPLETED | OUTPATIENT
Start: 2018-02-19 | End: 2018-02-19

## 2018-02-19 RX ORDER — CEFAZOLIN SODIUM 2 G/50ML
2 SOLUTION INTRAVENOUS
Status: COMPLETED | OUTPATIENT
Start: 2018-02-19 | End: 2018-02-19

## 2018-02-19 RX ORDER — HYDROMORPHONE HYDROCHLORIDE 1 MG/ML
1 INJECTION, SOLUTION INTRAMUSCULAR; INTRAVENOUS; SUBCUTANEOUS
Status: DISCONTINUED | OUTPATIENT
Start: 2018-02-19 | End: 2018-02-20 | Stop reason: HOSPADM

## 2018-02-19 RX ORDER — DEXAMETHASONE SODIUM PHOSPHATE 4 MG/ML
INJECTION, SOLUTION INTRA-ARTICULAR; INTRALESIONAL; INTRAMUSCULAR; INTRAVENOUS; SOFT TISSUE
Status: DISCONTINUED | OUTPATIENT
Start: 2018-02-19 | End: 2018-02-19

## 2018-02-19 RX ORDER — PROMETHAZINE HYDROCHLORIDE 25 MG/1
25 TABLET ORAL EVERY 6 HOURS PRN
Status: DISCONTINUED | OUTPATIENT
Start: 2018-02-19 | End: 2018-02-20 | Stop reason: HOSPADM

## 2018-02-19 RX ORDER — ALBUMIN HUMAN 50 G/1000ML
SOLUTION INTRAVENOUS CONTINUOUS PRN
Status: DISCONTINUED | OUTPATIENT
Start: 2018-02-19 | End: 2018-02-19

## 2018-02-19 RX ORDER — BUPIVACAINE HYDROCHLORIDE 2.5 MG/ML
INJECTION, SOLUTION EPIDURAL; INFILTRATION; INTRACAUDAL
Status: DISCONTINUED | OUTPATIENT
Start: 2018-02-19 | End: 2018-02-19 | Stop reason: HOSPADM

## 2018-02-19 RX ORDER — IBUPROFEN 400 MG/1
800 TABLET ORAL EVERY 6 HOURS PRN
Status: DISCONTINUED | OUTPATIENT
Start: 2018-02-19 | End: 2018-02-20 | Stop reason: HOSPADM

## 2018-02-19 RX ORDER — ONDANSETRON 2 MG/ML
4 INJECTION INTRAMUSCULAR; INTRAVENOUS EVERY 6 HOURS PRN
Status: DISCONTINUED | OUTPATIENT
Start: 2018-02-19 | End: 2018-02-20 | Stop reason: HOSPADM

## 2018-02-19 RX ORDER — ROCURONIUM BROMIDE 10 MG/ML
INJECTION, SOLUTION INTRAVENOUS
Status: DISCONTINUED | OUTPATIENT
Start: 2018-02-19 | End: 2018-02-19

## 2018-02-19 RX ORDER — VASOPRESSIN 20 [USP'U]/ML
INJECTION, SOLUTION INTRAMUSCULAR; SUBCUTANEOUS
Status: DISCONTINUED | OUTPATIENT
Start: 2018-02-19 | End: 2018-02-19 | Stop reason: HOSPADM

## 2018-02-19 RX ORDER — MEPERIDINE HYDROCHLORIDE 50 MG/ML
12.5 INJECTION INTRAMUSCULAR; INTRAVENOUS; SUBCUTANEOUS ONCE AS NEEDED
Status: COMPLETED | OUTPATIENT
Start: 2018-02-19 | End: 2018-02-19

## 2018-02-19 RX ORDER — OXYCODONE HYDROCHLORIDE 5 MG/1
5 TABLET ORAL ONCE AS NEEDED
Status: DISCONTINUED | OUTPATIENT
Start: 2018-02-19 | End: 2018-02-19 | Stop reason: HOSPADM

## 2018-02-19 RX ORDER — GLYCOPYRROLATE 0.2 MG/ML
INJECTION INTRAMUSCULAR; INTRAVENOUS
Status: DISCONTINUED | OUTPATIENT
Start: 2018-02-19 | End: 2018-02-19

## 2018-02-19 RX ORDER — SODIUM CHLORIDE 9 MG/ML
INJECTION, SOLUTION INTRAVENOUS CONTINUOUS
Status: DISCONTINUED | OUTPATIENT
Start: 2018-02-19 | End: 2018-02-19

## 2018-02-19 RX ORDER — FERROUS SULFATE 324(65)MG
85 TABLET, DELAYED RELEASE (ENTERIC COATED) ORAL DAILY
COMMUNITY
End: 2018-02-26

## 2018-02-19 RX ORDER — HYDROMORPHONE HYDROCHLORIDE 2 MG/ML
0.4 INJECTION, SOLUTION INTRAMUSCULAR; INTRAVENOUS; SUBCUTANEOUS EVERY 5 MIN PRN
Status: DISCONTINUED | OUTPATIENT
Start: 2018-02-19 | End: 2018-02-19 | Stop reason: HOSPADM

## 2018-02-19 RX ORDER — FENTANYL CITRATE 50 UG/ML
INJECTION, SOLUTION INTRAMUSCULAR; INTRAVENOUS
Status: DISCONTINUED | OUTPATIENT
Start: 2018-02-19 | End: 2018-02-19

## 2018-02-19 RX ORDER — PROPOFOL 10 MG/ML
VIAL (ML) INTRAVENOUS
Status: DISCONTINUED | OUTPATIENT
Start: 2018-02-19 | End: 2018-02-19

## 2018-02-19 RX ORDER — OXYCODONE HYDROCHLORIDE 5 MG/1
5 TABLET ORAL
Status: DISCONTINUED | OUTPATIENT
Start: 2018-02-19 | End: 2018-02-19 | Stop reason: HOSPADM

## 2018-02-19 RX ORDER — SODIUM CHLORIDE 0.9 % (FLUSH) 0.9 %
3 SYRINGE (ML) INJECTION
Status: DISCONTINUED | OUTPATIENT
Start: 2018-02-19 | End: 2018-02-20 | Stop reason: HOSPADM

## 2018-02-19 RX ORDER — DOCUSATE SODIUM 100 MG/1
100 CAPSULE, LIQUID FILLED ORAL DAILY
Status: DISCONTINUED | OUTPATIENT
Start: 2018-02-20 | End: 2018-02-20 | Stop reason: HOSPADM

## 2018-02-19 RX ORDER — PHENYLEPHRINE HYDROCHLORIDE 10 MG/ML
INJECTION INTRAVENOUS
Status: DISCONTINUED | OUTPATIENT
Start: 2018-02-19 | End: 2018-02-19

## 2018-02-19 RX ADMIN — ROCURONIUM BROMIDE 40 MG: 10 INJECTION, SOLUTION INTRAVENOUS at 02:02

## 2018-02-19 RX ADMIN — PHENYLEPHRINE HYDROCHLORIDE 200 MCG: 10 INJECTION INTRAVENOUS at 03:02

## 2018-02-19 RX ADMIN — LIDOCAINE HYDROCHLORIDE 40 MG: 20 INJECTION, SOLUTION INTRAVENOUS at 02:02

## 2018-02-19 RX ADMIN — PROPOFOL 190 MG: 10 INJECTION, EMULSION INTRAVENOUS at 02:02

## 2018-02-19 RX ADMIN — ONDANSETRON 4 MG: 2 INJECTION INTRAMUSCULAR; INTRAVENOUS at 03:02

## 2018-02-19 RX ADMIN — FENTANYL CITRATE 25 MCG: 50 INJECTION, SOLUTION INTRAMUSCULAR; INTRAVENOUS at 04:02

## 2018-02-19 RX ADMIN — SODIUM CHLORIDE, SODIUM LACTATE, POTASSIUM CHLORIDE, AND CALCIUM CHLORIDE: 600; 310; 30; 20 INJECTION, SOLUTION INTRAVENOUS at 01:02

## 2018-02-19 RX ADMIN — FENTANYL CITRATE 50 MCG: 50 INJECTION, SOLUTION INTRAMUSCULAR; INTRAVENOUS at 04:02

## 2018-02-19 RX ADMIN — ALBUMIN (HUMAN): 2.5 SOLUTION INTRAVENOUS at 02:02

## 2018-02-19 RX ADMIN — SODIUM CHLORIDE, SODIUM LACTATE, POTASSIUM CHLORIDE, AND CALCIUM CHLORIDE: 600; 310; 30; 20 INJECTION, SOLUTION INTRAVENOUS at 03:02

## 2018-02-19 RX ADMIN — GLYCOPYRROLATE 0.2 MG: 0.2 INJECTION, SOLUTION INTRAMUSCULAR; INTRAVENOUS at 02:02

## 2018-02-19 RX ADMIN — ACETAMINOPHEN 1000 MG: 10 INJECTION, SOLUTION INTRAVENOUS at 02:02

## 2018-02-19 RX ADMIN — FENTANYL CITRATE 100 MCG: 50 INJECTION, SOLUTION INTRAMUSCULAR; INTRAVENOUS at 03:02

## 2018-02-19 RX ADMIN — FENTANYL CITRATE 100 MCG: 50 INJECTION, SOLUTION INTRAMUSCULAR; INTRAVENOUS at 02:02

## 2018-02-19 RX ADMIN — KETOROLAC TROMETHAMINE 30 MG: 30 INJECTION, SOLUTION INTRAMUSCULAR; INTRAVENOUS at 04:02

## 2018-02-19 RX ADMIN — MIDAZOLAM HYDROCHLORIDE 4 MG: 5 INJECTION, SOLUTION INTRAMUSCULAR; INTRAVENOUS at 12:02

## 2018-02-19 RX ADMIN — DIPHENHYDRAMINE HYDROCHLORIDE 6.25 MG: 50 INJECTION, SOLUTION INTRAMUSCULAR; INTRAVENOUS at 02:02

## 2018-02-19 RX ADMIN — CEFAZOLIN SODIUM 2 G: 2 SOLUTION INTRAVENOUS at 02:02

## 2018-02-19 RX ADMIN — CARBOXYMETHYLCELLULOSE SODIUM 2 DROP: 2.5 SOLUTION/ DROPS OPHTHALMIC at 02:02

## 2018-02-19 RX ADMIN — FAMOTIDINE 20 MG: 20 TABLET, FILM COATED ORAL at 12:02

## 2018-02-19 RX ADMIN — OXYCODONE HYDROCHLORIDE 5 MG: 5 TABLET ORAL at 04:02

## 2018-02-19 RX ADMIN — NEOSTIGMINE METHYLSULFATE 5 MG: 1 INJECTION INTRAVENOUS at 03:02

## 2018-02-19 RX ADMIN — DEXAMETHASONE SODIUM PHOSPHATE 8 MG: 4 INJECTION, SOLUTION INTRAMUSCULAR; INTRAVENOUS at 02:02

## 2018-02-19 RX ADMIN — KETOROLAC TROMETHAMINE 30 MG: 30 INJECTION, SOLUTION INTRAMUSCULAR at 06:02

## 2018-02-19 RX ADMIN — GLYCOPYRROLATE 0.6 MG: 0.2 INJECTION, SOLUTION INTRAMUSCULAR; INTRAVENOUS at 03:02

## 2018-02-19 RX ADMIN — ROCURONIUM BROMIDE 10 MG: 10 INJECTION, SOLUTION INTRAVENOUS at 02:02

## 2018-02-19 RX ADMIN — VASOPRESSIN 20 UNITS: 20 INJECTION INTRAVENOUS at 02:02

## 2018-02-19 RX ADMIN — BUPIVACAINE HYDROCHLORIDE 10 ML: 2.5 INJECTION, SOLUTION EPIDURAL; INFILTRATION; INTRACAUDAL; PERINEURAL at 03:02

## 2018-02-19 RX ADMIN — HYDROCODONE BITARTRATE AND ACETAMINOPHEN 1 TABLET: 10; 325 TABLET ORAL at 07:02

## 2018-02-19 RX ADMIN — POTASSIUM CHLORIDE AND SODIUM CHLORIDE: 900; 150 INJECTION, SOLUTION INTRAVENOUS at 06:02

## 2018-02-19 RX ADMIN — PHENYLEPHRINE HYDROCHLORIDE 200 MCG: 10 INJECTION INTRAVENOUS at 02:02

## 2018-02-19 RX ADMIN — MEPERIDINE HYDROCHLORIDE 12.5 MG: 50 INJECTION INTRAMUSCULAR; INTRAVENOUS; SUBCUTANEOUS at 04:02

## 2018-02-19 RX ADMIN — LIDOCAINE HYDROCHLORIDE 60 MG: 20 INJECTION, SOLUTION INTRAVENOUS at 04:02

## 2018-02-19 NOTE — ANESTHESIA POSTPROCEDURE EVALUATION
"Anesthesia Post Evaluation    Patient: Kathe Hudson    Procedure(s) Performed: Procedure(s) (LRB):  MYOMECTOMY - OPEN (N/A)    Final Anesthesia Type: general  Patient location during evaluation: [facility]  Patient participation: Yes- Able to Participate  Level of consciousness: awake and alert  Post-procedure vital signs: reviewed and stable  Pain management: adequate  Airway patency: patent  PONV status at discharge: No PONV  Anesthetic complications: no      Cardiovascular status: blood pressure returned to baseline  Respiratory status: unassisted and room air  Hydration status: euvolemic  Follow-up not needed.        Visit Vitals  /75   Pulse (!) 59   Temp 37.2 °C (98.9 °F) (Oral)   Resp 18   Ht 5' 7" (1.702 m)   Wt 67.1 kg (148 lb)   LMP 01/29/2018 (Exact Date)   SpO2 100%   Breastfeeding? No   BMI 23.18 kg/m²       Pain/Pranay Score: Pain Assessment Performed: Yes (2/19/2018  4:18 PM)  Presence of Pain: complains of pain/discomfort (2/19/2018  4:29 PM)  Pain Rating Prior to Med Admin: 5 (2/19/2018  4:56 PM)  Pranay Score: 8 (2/19/2018  4:35 PM)      "

## 2018-02-19 NOTE — TRANSFER OF CARE
"Anesthesia Transfer of Care Note    Patient: Kathe Hudson    Procedure(s) Performed: Procedure(s) (LRB):  MYOMECTOMY - OPEN (N/A)    Patient location: PACU    Anesthesia Type: general    Transport from OR: Transported from OR on room air with adequate spontaneous ventilation    Post pain: adequate analgesia    Post assessment: no apparent anesthetic complications    Post vital signs: stable    Level of consciousness: responds to stimulation    Nausea/Vomiting: no nausea/vomiting    Complications: none    Transfer of care protocol was followed      Last vitals:   Visit Vitals  /84 (BP Location: Left arm, Patient Position: Sitting)   Pulse (!) 58   Temp 37.3 °C (99.1 °F) (Oral)   Resp 16   Ht 5' 7" (1.702 m)   Wt 67.1 kg (148 lb)   LMP 01/29/2018 (Exact Date)   SpO2 100%   Breastfeeding? No   BMI 23.18 kg/m²     "

## 2018-02-20 ENCOUNTER — PATIENT MESSAGE (OUTPATIENT)
Dept: OBSTETRICS AND GYNECOLOGY | Facility: CLINIC | Age: 38
End: 2018-02-20

## 2018-02-20 VITALS
BODY MASS INDEX: 23.91 KG/M2 | WEIGHT: 152.31 LBS | TEMPERATURE: 99 F | RESPIRATION RATE: 18 BRPM | HEIGHT: 67 IN | SYSTOLIC BLOOD PRESSURE: 116 MMHG | HEART RATE: 60 BPM | DIASTOLIC BLOOD PRESSURE: 62 MMHG | OXYGEN SATURATION: 99 %

## 2018-02-20 LAB
ANION GAP SERPL CALC-SCNC: 3 MMOL/L
BASOPHILS # BLD AUTO: 0 K/UL
BASOPHILS NFR BLD: 0 %
BUN SERPL-MCNC: 9 MG/DL
CALCIUM SERPL-MCNC: 8.7 MG/DL
CHLORIDE SERPL-SCNC: 110 MMOL/L
CO2 SERPL-SCNC: 25 MMOL/L
CREAT SERPL-MCNC: 0.8 MG/DL
DIFFERENTIAL METHOD: ABNORMAL
EOSINOPHIL # BLD AUTO: 0 K/UL
EOSINOPHIL NFR BLD: 0 %
ERYTHROCYTE [DISTWIDTH] IN BLOOD BY AUTOMATED COUNT: 19.8 %
EST. GFR  (AFRICAN AMERICAN): >60 ML/MIN/1.73 M^2
EST. GFR  (NON AFRICAN AMERICAN): >60 ML/MIN/1.73 M^2
GLUCOSE SERPL-MCNC: 102 MG/DL
HCT VFR BLD AUTO: 31.3 %
HGB BLD-MCNC: 10.1 G/DL
LYMPHOCYTES # BLD AUTO: 0.9 K/UL
LYMPHOCYTES NFR BLD: 9.2 %
MCH RBC QN AUTO: 27.2 PG
MCHC RBC AUTO-ENTMCNC: 32.3 G/DL
MCV RBC AUTO: 84 FL
MONOCYTES # BLD AUTO: 0.8 K/UL
MONOCYTES NFR BLD: 8.3 %
NEUTROPHILS # BLD AUTO: 8 K/UL
NEUTROPHILS NFR BLD: 82.3 %
PLATELET # BLD AUTO: 240 K/UL
PMV BLD AUTO: 10.2 FL
POTASSIUM SERPL-SCNC: 4.6 MMOL/L
RBC # BLD AUTO: 3.72 M/UL
SODIUM SERPL-SCNC: 138 MMOL/L
WBC # BLD AUTO: 9.74 K/UL

## 2018-02-20 PROCEDURE — 85025 COMPLETE CBC W/AUTO DIFF WBC: CPT

## 2018-02-20 PROCEDURE — 63600175 PHARM REV CODE 636 W HCPCS: Performed by: OBSTETRICS & GYNECOLOGY

## 2018-02-20 PROCEDURE — 99024 POSTOP FOLLOW-UP VISIT: CPT | Mod: ,,, | Performed by: OBSTETRICS & GYNECOLOGY

## 2018-02-20 PROCEDURE — 80048 BASIC METABOLIC PNL TOTAL CA: CPT

## 2018-02-20 PROCEDURE — 36415 COLL VENOUS BLD VENIPUNCTURE: CPT

## 2018-02-20 PROCEDURE — 25000003 PHARM REV CODE 250: Performed by: OBSTETRICS & GYNECOLOGY

## 2018-02-20 RX ORDER — IBUPROFEN 600 MG/1
600 TABLET ORAL EVERY 6 HOURS PRN
Qty: 60 TABLET | Refills: 0 | Status: SHIPPED | OUTPATIENT
Start: 2018-02-20 | End: 2018-12-06 | Stop reason: SDUPTHER

## 2018-02-20 RX ORDER — HYDROCODONE BITARTRATE AND ACETAMINOPHEN 5; 325 MG/1; MG/1
1 TABLET ORAL EVERY 6 HOURS PRN
Qty: 30 TABLET | Refills: 0 | Status: SHIPPED | OUTPATIENT
Start: 2018-02-20 | End: 2018-03-19

## 2018-02-20 RX ADMIN — HYDROMORPHONE HYDROCHLORIDE 1 MG: 1 INJECTION, SOLUTION INTRAMUSCULAR; INTRAVENOUS; SUBCUTANEOUS at 02:02

## 2018-02-20 RX ADMIN — POTASSIUM CHLORIDE AND SODIUM CHLORIDE: 900; 150 INJECTION, SOLUTION INTRAVENOUS at 02:02

## 2018-02-20 RX ADMIN — HYDROCODONE BITARTRATE AND ACETAMINOPHEN 1 TABLET: 10; 325 TABLET ORAL at 12:02

## 2018-02-20 RX ADMIN — KETOROLAC TROMETHAMINE 30 MG: 30 INJECTION, SOLUTION INTRAMUSCULAR at 06:02

## 2018-02-20 RX ADMIN — DOCUSATE SODIUM 100 MG: 100 CAPSULE, LIQUID FILLED ORAL at 08:02

## 2018-02-20 RX ADMIN — HYDROCODONE BITARTRATE AND ACETAMINOPHEN 1 TABLET: 10; 325 TABLET ORAL at 06:02

## 2018-02-20 RX ADMIN — KETOROLAC TROMETHAMINE 30 MG: 30 INJECTION, SOLUTION INTRAMUSCULAR at 12:02

## 2018-02-20 NOTE — SUBJECTIVE & OBJECTIVE
Interval History: Pain states she doing well this AM.  She says her pain is moderately well controlled. She is ambulating and urinating without complaints. She tolerated soup last night without nausea or vomiting but has not passed flatus.  She states that she is belching.      Scheduled Meds:   docusate sodium  100 mg Oral Daily    ketorolac  30 mg Intravenous Q6H     Continuous Infusions:   0/9% NACL & POTASSIUM CHLORIDE 20 MEQ/L 125 mL/hr at 02/20/18 0222     PRN Meds:hydrocodone-acetaminophen 10-325mg, hydrocodone-acetaminophen 5-325mg, HYDROmorphone, ibuprofen, ondansetron, promethazine, sodium chloride 0.9%    Review of patient's allergies indicates:  No Known Allergies    Objective:     Vital Signs (Most Recent):  Temp: 98.4 °F (36.9 °C) (02/20/18 0505)  Pulse: 77 (02/20/18 0505)  Resp: 18 (02/20/18 0505)  BP: 116/68 (02/20/18 0505)  SpO2: 98 % (02/20/18 0505) Vital Signs (24h Range):  Temp:  [98.4 °F (36.9 °C)-99.8 °F (37.7 °C)] 98.4 °F (36.9 °C)  Pulse:  [58-97] 77  Resp:  [16-20] 18  SpO2:  [98 %-100 %] 98 %  BP: (116-163)/(68-88) 116/68     Weight: 69.1 kg (152 lb 5.4 oz)  Body mass index is 23.86 kg/m².  Patient's last menstrual period was 01/29/2018 (exact date).    I&O (Last 24H):    Intake/Output Summary (Last 24 hours) at 02/20/18 0636  Last data filed at 02/19/18 2100   Gross per 24 hour   Intake             1500 ml   Output              800 ml   Net              700 ml       Physical Exam:   Constitutional: She is oriented to person, place, and time. She appears well-developed and well-nourished. No distress.    HENT:   Head: Normocephalic and atraumatic.    Eyes: Conjunctivae are normal.    Neck: Neck supple.    Cardiovascular: Normal rate, regular rhythm and intact distal pulses.     Pulmonary/Chest: Effort normal. No respiratory distress.        Abdominal: She exhibits abdominal incision (Pfannenstiel incision, C/D/I.  No signs of infection or inflammation. ). She exhibits no distension.  There is tenderness (Appropriately tender for Post-Op Period). There is no rebound and no guarding.                 Neurological: She is alert and oriented to person, place, and time.    Skin: Skin is warm and dry. She is not diaphoretic.    Psychiatric: She has a normal mood and affect. Her behavior is normal. Judgment and thought content normal.       Laboratory:  CBC:   Recent Labs  Lab 02/20/18  0426   WBC 9.74   RBC 3.72*   HGB 10.1*   HCT 31.3*      MCV 84   MCH 27.2   MCHC 32.3

## 2018-02-20 NOTE — PROGRESS NOTES
[facility]  Obstetrics & Gynecology  Progress Note    Patient Name: Kathe Hudson  MRN: [id number]  Admission Date: 2/19/2018  Primary Care Provider: Primary Doctor No  Principal Problem: S/P myomectomy     Subjective:     HPI:  Ms. Hudson is a 37 yr old who is s/p open myomectomy secondary to a history of menorrhagia.      Interval History: Pain states she doing well this AM.  She says her pain is moderately well controlled. She is ambulating and urinating without complaints. She tolerated soup last night without nausea or vomiting but has not passed flatus.  She states that she is belching.      Scheduled Meds:   docusate sodium  100 mg Oral Daily    ketorolac  30 mg Intravenous Q6H     Continuous Infusions:   0/9% NACL & POTASSIUM CHLORIDE 20 MEQ/L 125 mL/hr at 02/20/18 0222     PRN Meds:hydrocodone-acetaminophen 10-325mg, hydrocodone-acetaminophen 5-325mg, HYDROmorphone, ibuprofen, ondansetron, promethazine, sodium chloride 0.9%    Review of patient's allergies indicates:  No Known Allergies    Objective:     Vital Signs (Most Recent):  Temp: 98.4 °F (36.9 °C) (02/20/18 0505)  Pulse: 77 (02/20/18 0505)  Resp: 18 (02/20/18 0505)  BP: 116/68 (02/20/18 0505)  SpO2: 98 % (02/20/18 0505) Vital Signs (24h Range):  Temp:  [98.4 °F (36.9 °C)-99.8 °F (37.7 °C)] 98.4 °F (36.9 °C)  Pulse:  [58-97] 77  Resp:  [16-20] 18  SpO2:  [98 %-100 %] 98 %  BP: (116-163)/(68-88) 116/68     Weight: 69.1 kg (152 lb 5.4 oz)  Body mass index is 23.86 kg/m².  Patient's last menstrual period was 01/29/2018 (exact date).    I&O (Last 24H):    Intake/Output Summary (Last 24 hours) at 02/20/18 0636  Last data filed at 02/19/18 2100   Gross per 24 hour   Intake             1500 ml   Output              800 ml   Net              700 ml       Physical Exam:   Constitutional: She is oriented to person, place, and time. She appears well-developed and well-nourished. No distress.    HENT:   Head: Normocephalic and  atraumatic.    Eyes: Conjunctivae are normal.    Neck: Neck supple.    Cardiovascular: Normal rate, regular rhythm and intact distal pulses.     Pulmonary/Chest: Effort normal. No respiratory distress.        Abdominal: She exhibits abdominal incision (Pfannenstiel incision, C/D/I.  No signs of infection or inflammation. ). She exhibits no distension. There is tenderness (Appropriately tender for Post-Op Period). There is no rebound and no guarding.                 Neurological: She is alert and oriented to person, place, and time.    Skin: Skin is warm and dry. She is not diaphoretic.    Psychiatric: She has a normal mood and affect. Her behavior is normal. Judgment and thought content normal.       Laboratory:  CBC:   Recent Labs  Lab 02/20/18  0426   WBC 9.74   RBC 3.72*   HGB 10.1*   HCT 31.3*      MCV 84   MCH 27.2   MCHC 32.3     Assessment/Plan:     * S/P myomectomy    - Patient Making good Post-OP Advances  - Pre H/H - 12/38  Post H/H  - 10/31  - WBC - 9  - Incision C/D/I  - Toradol, Norco and Dilaudid for Pain Relief  - TEDs and SCDs  - Can consider discharge later today with flatus.            Augustin Braden MD  Obstetrics & Gynecology  [facility]

## 2018-02-20 NOTE — BRIEF OP NOTE
BRIEF OPERATIVE NOTE       SUMMARY      Surgery Date: 02/20/2018     SURGEON: Cele Perry    ASSISTANT: Dr. Cathy Chan     PREOP DIAGNOSIS:   1. Pelvic pain  2. Fibroids    POSTOP DIAGNOSIS:    1. Pelvic pain  2. Fibroids    PROCEDURES: Abdominal myomectomy via mini-laparotomy (2 fibroids)    ANESTHESIA: general    FINDINGS/KEY COMPONENTS: Enlarged uterus with large fundal fibroid with a smaller subserosal fibroid.  Soft in nature.  Frozen specimen sent - pathology unable to be determined via frozen section.  Normal tubes and ovaries.    ESTIMATED BLOOD LOSS: 225 cc    COMPLICATIONS: None    PATHOLOGY:   Specimens     Start     Ordered    02/19/18 1522  Specimen to Pathology - Surgery  Once      02/19/18 1521

## 2018-02-20 NOTE — OP NOTE
DATE: (date: 02/20/2018)    OPERATIVE REPORT    PREOPERATIVE DIAGNOSIS  1. Pelvic pain  2. Fibroids    POSTOPERATIVE DIAGNOSIS  1. Pelvic pain  2. Fibroids    PROCEDURE:  1. Abdominal myomectomy via mini-laparotomy (2 fibroids)    SURGEON: Dr. Cele Perry    ASSISTANT: Dr. Cathy Chan    ANESTHESIA: General    COMPLICATIONS: None    EBL: 225 cc    IV FLUIDS: 1150 cc LR, 250 cc Albumin    URINE OUTPUT: 275 cc    FINDINGS: Enlarged uterus with large fundal fibroid with a smaller subserosal fibroid.  Soft in nature.  Frozen specimen sent - pathology unable to be determined via frozen section.  Normal tubes and ovaries.    PROCEDURE: Patient was taking to the operating room where general anesthesia was administered and found to be adequate.  She was prepped and draped in the dorsal supine position.  A agarwal catheter was inserted into the bladder.    Gloves were changed.  A Pfannenstiel skin incision was made with the scalpel and carried down to the underlying layer of fascia with the scalpel.  The incision was extended laterally with Menjivar scissors.  The superior aspect of the incision was grasped with the [facility] clamps, tented up and the underlying muscles were dissected off bluntly.  Attention was turned to the inferior aspect of the incision.  The underlying muscles were dissected off bluntly.  The rectus muscles were  in the midline.  The peritoneum was entered in digitally.  The incision was extended with finger fracture.  All laparotomy sponges were removed from the surgical field.     The uterus was inspected and found to be enlarged with a large, fundal fibroid.  There was a smaller subserosal fibroid anterior to the large fibroid.  A red rubber catheter was placed around the cervix at the level of the internal os.  This was tied to create a tourniquet.  All fibroids were removed in the following fashion: 20 Units of Pitressin diluted in 100cc of Normal saline was injected into the serosa  overlying the fibroid.  An incision was made with a Bovie.  This incision was carried down to the fibroid with the Bovie.  The fibroid was grasped with a Layhey clamp.  Using the Harmonic Focus, the fibroid was enucleated from the underlying myometrium.  Hemostasis was maintained utilizing the focus.  Once the fibroid was completely enucleated, it was handed to the waiting surgical nurse and sent to pathology for frozen section.  The uterus was exteriorized.  The deepest layer of myometrium was reapproximated with 2-0 PDS in a running, locked fashion.  The next layer of myometrium was reapproximated with 2-0 Vicryl in a running, locked fashion.  The serosa was reapproximated with 3-0 Monocryl in a baseball stitch fashion.  Excellent hemostasis was noted.      The uterus was returned to the abdomen.  The uterus was copiously irrigated.  Excellent hemostasis was noted.  A sheet of seprafilm was placed inside the abdominal cavity.  The peritoneum was reapproximated with 2-0 Vicryl in a running fashion.  The muscle was reapproximated with 2-0 Vicryl.  The fascia was reapproximated with 0 PDS in a running fashion.  The skin was closed with 4-0 Monocryl in a subcuticular fashion.  The agarwal was removed.  Sponge, lap, and needle counts were correct x 2.  The patient was taken to the recovery room in stable condition.

## 2018-02-20 NOTE — DISCHARGE SUMMARY
[facility]  Obstetrics & Gynecology  Discharge Summary    Patient Name: Kathe Hudson  MRN: [id number]  Admission Date: 2/19/2018  Hospital Length of Stay: 0 days  Discharge Date and Time:  02/20/2018 10:16 AM  Attending Physician: Cele Perry MD   Discharging Provider: Augustin Braden MD  Primary Care Provider: Primary Doctor No    HPI:  Ms. Hudson is a 37 yr old who is s/p open myomectomy secondary to a history of menorrhagia.      Hospital Course:  02/19/2018 - Uncomplicated Open Myomectomy.  02/20/2018 - POD #1. Patient doing well. Pain is moderately well controlled on current pain regimen.  Meeting Post-Op adavnces.      Procedure(s) (LRB):  MYOMECTOMY - OPEN (N/A)         Significant Diagnostic Studies: Labs:   CBC   Recent Labs  Lab 02/20/18  0426   WBC 9.74   HGB 10.1*   HCT 31.3*          Pending Diagnostic Studies:     None        Final Active Diagnoses:    Diagnosis Date Noted POA    PRINCIPAL PROBLEM:  S/P myomectomy [Z98.890] 02/19/2018 Not Applicable    Menorrhagia [N92.0] 02/14/2018 Yes      Problems Resolved During this Admission:    Diagnosis Date Noted Date Resolved POA        Discharged Condition: good    Disposition: Home or Self Care    Follow Up:  Follow-up Information     Cele Perry MD In 6 weeks.    Specialties:  Obstetrics, Obstetrics and Gynecology  Why:  For Post-Op Visit  Contact information:  [address] [place] [address] [id number] [phone]                 Patient Instructions:     Activity as tolerated     Other restrictions (specify):   Order Comments: Pelvic Rest - Nothing in the Vagina for 6 weeks.     Lifting restrictions     No driving until:   Order Comments: You can stand on the brake (in an emergency) without any pain in your abdomen.     Notify your health care provider if you experience any of the following:   Order Comments: Vaginal Bleeding greater than a pad per hour.     Notify your health care provider if  you experience any of the following:  increased confusion or weakness     Notify your health care provider if you experience any of the following:  persistent dizziness, light-headedness, or visual disturbances     Notify your health care provider if you experience any of the following:  worsening rash     Notify your health care provider if you experience any of the following:  severe persistent headache     Notify your health care provider if you experience any of the following:  difficulty breathing or increased cough     Notify your health care provider if you experience any of the following:  redness, tenderness, or signs of infection (pain, swelling, redness, odor or green/yellow discharge around incision site)     Notify your health care provider if you experience any of the following:  severe uncontrolled pain     Notify your health care provider if you experience any of the following:  persistent nausea and vomiting or diarrhea     Notify your health care provider if you experience any of the following:  temperature >100.4       Medications:  Reconciled Home Medications:   Current Discharge Medication List      START taking these medications    Details   hydrocodone-acetaminophen 5-325mg (NORCO) 5-325 mg per tablet Take 1 tablet by mouth every 6 (six) hours as needed for Pain.  Qty: 30 tablet, Refills: 0         CONTINUE these medications which have NOT CHANGED    Details   ferrous sulfate 324 mg (65 mg iron) TbEC Take 85 mg by mouth once daily.         STOP taking these medications       UNABLE TO FIND Comments:   Reason for Stopping:               Augustin Braden MD  Obstetrics & Gynecology  [facility]

## 2018-02-20 NOTE — PLAN OF CARE
Problem: Patient Care Overview  Goal: Plan of Care Review  Outcome: Ongoing (interventions implemented as appropriate)  Pt discharge instructions given. Pt verbalized understanding. Peripheral iv dc'd. VSS

## 2018-02-20 NOTE — ASSESSMENT & PLAN NOTE
- Patient Making good Post-OP Advances  - Pre H/H - 12/38  Post H/H  - 10/31  - WBC - 9  - Incision C/D/I  - Toradol, Norco and Dilaudid for Pain Relief  - TEDs and SCDs  - Can consider discharge later today with flatus.

## 2018-02-20 NOTE — HOSPITAL COURSE
02/19/2018 - Uncomplicated Open Myomectomy.  02/20/2018 - POD #1. Patient doing well. Pain is moderately well controlled on current pain regimen.  Meeting Post-Op adavnces.

## 2018-02-20 NOTE — PLAN OF CARE
Problem: Patient Care Overview  Goal: Plan of Care Review  Outcome: Ongoing (interventions implemented as appropriate)  Patient had an uneventful night, no falls, no trauma, no skin breakdown. Mother stayed with patient. C/o of pain throughout night, with some relief noted. Patient ambulated several times during the shift. Used several peripads throughout the shift, small amount of blood noted to each. VSS. Purposeful hourly rounding done.

## 2018-02-22 ENCOUNTER — TELEPHONE (OUTPATIENT)
Dept: OBSTETRICS AND GYNECOLOGY | Facility: CLINIC | Age: 38
End: 2018-02-22

## 2018-02-22 NOTE — TELEPHONE ENCOUNTER
Contacted the pt per Dr. Perry to inquire if the pt has been passing gas. Pt stated that she has but that she still has not made a bowel movement. Informed the pt that Dr. Perry recommends that she tries Gas X and to take a stool softener like Miralax. Pt stated that she took Miralax this morning and would like to know if Dr. Perry can prescribe her a stronger pain medication. Informed the pt that Dr. Perry won't prescribe a different pain medication and that it will make her constipation worse. Pt instructed to contact the clinic if she does not make a BM by tomorrow. Pt verbalized understanding.

## 2018-02-24 ENCOUNTER — NURSE TRIAGE (OUTPATIENT)
Dept: ADMINISTRATIVE | Facility: CLINIC | Age: 38
End: 2018-02-24

## 2018-02-24 ENCOUNTER — PATIENT MESSAGE (OUTPATIENT)
Dept: OBSTETRICS AND GYNECOLOGY | Facility: CLINIC | Age: 38
End: 2018-02-24

## 2018-02-24 NOTE — TELEPHONE ENCOUNTER
"    Reason for Disposition   Sounds like a serious complication to the triager    Answer Assessment - Initial Assessment Questions  1. SYMPTOM: "What's the main symptom you're concerned about?" (e.g., redness, pain, drainage)      Swelling to vaginal area  2. ONSET: "When did ________  start?"      Today, has had gas throughout  3. SURGERY: "What surgery was performed?"  Myomectomy  4. DATE of SURGERY: "When was surgery performed?"       98.9  5. INCISION SITE: "Where is the incision located?"   Vaginal "Bikini area"  6. REDNESS: "Is there any redness at the incision site?" If yes, ask: "How wide across is the redness?" (Inches, centimeters)       no  7. PAIN: "Is there any pain?" If so, ask: "How bad is it?"  (Scale 1-10; or mild, moderate, severe)      2  8. BLEEDING: "Is there any bleeding?" If so, ask: "How much?" and "Where?"     Vaginal bleeding "Up and down, a little increase and down again" Soaks about 2 pads/day  9. DRAINAGE: "Is there any drainage from the incision site?" If yes, ask: "What color and how much?" (e.g., red, cloudy, pus; drops, teaspoon)      no  10. FEVER: "Do you have a fever?" If so, ask: "What is your temperature, how was it measured, and when did it start?"        98.9 while on triage call  11. OTHER SYMPTOMS: "Do you have any other symptoms?" (e.g., shaking chills, weakness, rash elsewhere on body)  Denies weakness, chills and rash.  States has gas    Protocols used: ST POST-OP INCISION SYMPTOMS-A-    Patient states that the surgical site has become more swollen since having surgery.  "

## 2018-02-25 ENCOUNTER — PATIENT MESSAGE (OUTPATIENT)
Dept: OBSTETRICS AND GYNECOLOGY | Facility: CLINIC | Age: 38
End: 2018-02-25

## 2018-02-26 ENCOUNTER — TELEPHONE (OUTPATIENT)
Dept: OBSTETRICS AND GYNECOLOGY | Facility: CLINIC | Age: 38
End: 2018-02-26

## 2018-02-26 ENCOUNTER — OFFICE VISIT (OUTPATIENT)
Dept: OBSTETRICS AND GYNECOLOGY | Facility: CLINIC | Age: 38
End: 2018-02-26
Payer: COMMERCIAL

## 2018-02-26 VITALS
HEIGHT: 67 IN | DIASTOLIC BLOOD PRESSURE: 80 MMHG | WEIGHT: 152.25 LBS | BODY MASS INDEX: 23.9 KG/M2 | SYSTOLIC BLOOD PRESSURE: 130 MMHG

## 2018-02-26 DIAGNOSIS — Z98.890 S/P MYOMECTOMY: Primary | ICD-10-CM

## 2018-02-26 DIAGNOSIS — T81.89XA INCISIONAL IRRITATION, INITIAL ENCOUNTER: ICD-10-CM

## 2018-02-26 PROCEDURE — 99213 OFFICE O/P EST LOW 20 MIN: CPT | Mod: 25,S$GLB,, | Performed by: NURSE PRACTITIONER

## 2018-02-26 PROCEDURE — 99999 PR PBB SHADOW E&M-EST. PATIENT-LVL III: CPT | Mod: PBBFAC,,, | Performed by: NURSE PRACTITIONER

## 2018-02-26 PROCEDURE — 3008F BODY MASS INDEX DOCD: CPT | Mod: S$GLB,,, | Performed by: NURSE PRACTITIONER

## 2018-02-26 NOTE — TELEPHONE ENCOUNTER
Contacted the pt to inquire if she is still having complications with her incision or had any concerns. Pt stated that she has scheduled an appointment in the [facility] for an incision check and will f/u with Dr. Castillo. Verbalized understanding. Instructed the pt to contact the clinic if she has any other questions or concerns. Pt verbalized understanding.

## 2018-02-26 NOTE — PROGRESS NOTES
CC: swelling of incision and vaginal swelling, s/p myomectomy     HPI: Pt is a 37 y.o.  female who presents for incision check.   She is s/p abdominal myomectomy via mini-laparotomy on 2/20/18.  Reports + swelling around incision site and swelling to mons pubis.  Denies associated pain, redness or purulent drainage. No associated fever.  Reports some constipation, she stopped taking Colace b/c she did not want to form dependence to it.  She is using Norco and Motrin PRN for pain.     ROS:  GENERAL: Feeling well overall. Denies fever or chills.   SKIN: Denies rash or lesions.   HEAD: Denies head injury or headache.   NODES: Denies enlarged lymph nodes.   CHEST: Denies chest pain or shortness of breath.   CARDIOVASCULAR: Denies palpitations or left sided chest pain.   ABDOMEN: No abdominal pain, constipation, diarrhea, nausea, vomiting or rectal bleeding.   URINARY: No dysuria, hematuria, or burning on urination.  REPRODUCTIVE: See HPI.   BREASTS: Denies pain, lumps, or nipple discharge.   HEMATOLOGIC: No easy bruisability or excessive bleeding.   MUSCULOSKELETAL: Denies joint pain or swelling.   NEUROLOGIC: Denies syncope or weakness.   PSYCHIATRIC: Denies depression, anxiety or mood swings.    PE:   APPEARANCE: Well nourished, well developed, Black or  female in no acute distress.  ABDOMEN: Pfannenstiel incision- clean dry and intact- healing well  VULVA: No lesions. Normal external female genitalia.  URETHRAL MEATUS: Normal size and location, no lesions, no prolapse.  URETHRA: No masses, tenderness, or prolapse.  VAGINA: Moist. No lesions or lacerations noted. No abnormal discharge present. No odor present.   CERVIX: No lesions or discharge. No cervical motion tenderness.   UTERUS: Normal size, regular shape, mobile, non-tender.  ADNEXA: No tenderness. No fullness or masses palpated in the adnexal regions.   ANUS PERINEUM: Normal.      Diagnosis:  1. S/P myomectomy    2. Incisional irritation,  initial encounter        Plan:   Reassured incision is healing well  Discussed minor swelling she is experiencing is secondary to underlying sutures still dissolving  Discussed to resume Colace, it is not a drug that typically results in dependence. Can use OTC Miralax or Milk of Magnesia PRN if no BM   Discussed to rest, and limit activity around the house over the next week to allow body to properly heal  Reviewed warning S/S of infection          Follow-up with Dr. Perry in 5 weeks for post op check    Sherrie Olivarez, WILLIEP-C

## 2018-02-26 NOTE — TELEPHONE ENCOUNTER
----- Message from Cele Perry MD sent at 2/25/2018  5:28 PM CST -----  Please call the patient to follow-up on the nurse triage phone call on Saturday regarding her incision.

## 2018-03-14 ENCOUNTER — PATIENT MESSAGE (OUTPATIENT)
Dept: OBSTETRICS AND GYNECOLOGY | Facility: CLINIC | Age: 38
End: 2018-03-14

## 2018-03-15 ENCOUNTER — TELEPHONE (OUTPATIENT)
Dept: OBSTETRICS AND GYNECOLOGY | Facility: CLINIC | Age: 38
End: 2018-03-15

## 2018-03-15 ENCOUNTER — LAB VISIT (OUTPATIENT)
Dept: LAB | Facility: OTHER | Age: 38
End: 2018-03-15
Attending: OBSTETRICS & GYNECOLOGY
Payer: COMMERCIAL

## 2018-03-15 DIAGNOSIS — R39.9 UTI SYMPTOMS: ICD-10-CM

## 2018-03-15 DIAGNOSIS — R39.9 UTI SYMPTOMS: Primary | ICD-10-CM

## 2018-03-15 LAB
BILIRUB UR QL STRIP: NEGATIVE
CLARITY UR: CLEAR
COLOR UR: YELLOW
GLUCOSE UR QL STRIP: NEGATIVE
HGB UR QL STRIP: ABNORMAL
KETONES UR QL STRIP: NEGATIVE
LEUKOCYTE ESTERASE UR QL STRIP: NEGATIVE
NITRITE UR QL STRIP: NEGATIVE
PH UR STRIP: 6 [PH] (ref 5–8)
PROT UR QL STRIP: NEGATIVE
SP GR UR STRIP: <=1.005 (ref 1–1.03)
URN SPEC COLLECT METH UR: ABNORMAL
UROBILINOGEN UR STRIP-ACNC: NEGATIVE EU/DL

## 2018-03-15 PROCEDURE — 87086 URINE CULTURE/COLONY COUNT: CPT

## 2018-03-15 PROCEDURE — 81003 URINALYSIS AUTO W/O SCOPE: CPT

## 2018-03-17 LAB — BACTERIA UR CULT: NO GROWTH

## 2018-03-19 ENCOUNTER — OFFICE VISIT (OUTPATIENT)
Dept: OBSTETRICS AND GYNECOLOGY | Facility: CLINIC | Age: 38
End: 2018-03-19
Attending: OBSTETRICS & GYNECOLOGY
Payer: COMMERCIAL

## 2018-03-19 VITALS
WEIGHT: 143.75 LBS | DIASTOLIC BLOOD PRESSURE: 88 MMHG | HEIGHT: 67 IN | SYSTOLIC BLOOD PRESSURE: 132 MMHG | BODY MASS INDEX: 22.56 KG/M2

## 2018-03-19 DIAGNOSIS — Z09 POSTOP CHECK: Primary | ICD-10-CM

## 2018-03-19 PROBLEM — D25.1 INTRAMURAL AND SUBSEROUS LEIOMYOMA OF UTERUS: Status: RESOLVED | Noted: 2018-02-07 | Resolved: 2018-03-19

## 2018-03-19 PROBLEM — D50.0 IRON DEFICIENCY ANEMIA DUE TO CHRONIC BLOOD LOSS: Status: RESOLVED | Noted: 2018-02-07 | Resolved: 2018-03-19

## 2018-03-19 PROBLEM — D25.2 INTRAMURAL AND SUBSEROUS LEIOMYOMA OF UTERUS: Status: RESOLVED | Noted: 2018-02-07 | Resolved: 2018-03-19

## 2018-03-19 PROBLEM — N92.0 MENORRHAGIA: Status: RESOLVED | Noted: 2018-02-14 | Resolved: 2018-03-19

## 2018-03-19 PROCEDURE — 99024 POSTOP FOLLOW-UP VISIT: CPT | Mod: S$GLB,,, | Performed by: OBSTETRICS & GYNECOLOGY

## 2018-03-19 PROCEDURE — 99999 PR PBB SHADOW E&M-EST. PATIENT-LVL II: CPT | Mod: PBBFAC,,, | Performed by: OBSTETRICS & GYNECOLOGY

## 2018-03-19 NOTE — PROGRESS NOTES
"CC: Postoperative visit    Kathe Hudson is a 38 y.o. female  presents for a postoperative visit s/p abdominal myomectomy on 2018.  Her postoperative course was uncomplicated.  She is doing well postoperative.    Reporting discomfort with urination.  No dysuria.  No frequency.  UA C&S is ruled out.      Pathology showed:  FINAL PATHOLOGIC DIAGNOSIS  LEIOMYOMATA.    /88 (BP Location: Left arm, Patient Position: Sitting, BP Method: Small (Manual))   Ht 5' 7" (1.702 m)   Wt 65.2 kg (143 lb 11.8 oz)   LMP 2018 (Within Days)   BMI 22.51 kg/m²     ROS:  GENERAL: No fever, chills, fatigability or weight loss.  VULVAR: No pain, no lesions and no itching.  VAGINAL: No relaxation, no itching, no discharge, no abnormal bleeding and no lesions.  ABDOMEN: No abdominal pain. Denies nausea. Denies vomiting. No diarrhea. No constipation  BREAST: Denies pain. No lumps. No discharge.  URINARY: No incontinence, no nocturia, no frequency and no dysuria.  CARDIOVASCULAR: No chest pain. No shortness of breath. No leg cramps.  NEUROLOGICAL: No headaches. No vision changes.    Physical Exam    INCISION: Clean, dry, intact.  Well healed.    PELVIC: Normal external genitalia without lesions.  Normal hair distribution.  Adequate perineal body, normal urethral meatus.  Vagina moist and well rugated without lesions or discharge.  Cervix pink, without lesions, discharge or tenderness.  No significant cystocele or rectocele.  Bimanual exam shows uterus to be normal size, regular, mobile and nontender.  Adnexa without masses or tenderness.      1. Postop check         Patient can return to normal activities.  Return to clinic in 1 year for well woman exam.  "

## 2018-07-10 ENCOUNTER — PATIENT MESSAGE (OUTPATIENT)
Dept: OBSTETRICS AND GYNECOLOGY | Facility: CLINIC | Age: 38
End: 2018-07-10

## 2018-07-10 DIAGNOSIS — D50.0 CHRONIC BLOOD LOSS ANEMIA: Primary | ICD-10-CM

## 2018-07-11 ENCOUNTER — TELEPHONE (OUTPATIENT)
Dept: OBSTETRICS AND GYNECOLOGY | Facility: CLINIC | Age: 38
End: 2018-07-11

## 2018-07-11 NOTE — TELEPHONE ENCOUNTER
Hello, this is Jeff calling from the OBGYN clinic at [facility]. Calling to schedule your CBC lab. (No answer, left VM message for the pt regarding this information. Order already placed. Please schedule the patient's lab.)

## 2018-12-06 ENCOUNTER — OFFICE VISIT (OUTPATIENT)
Dept: INTERNAL MEDICINE | Facility: CLINIC | Age: 38
End: 2018-12-06
Payer: COMMERCIAL

## 2018-12-06 VITALS
OXYGEN SATURATION: 98 % | SYSTOLIC BLOOD PRESSURE: 124 MMHG | BODY MASS INDEX: 20.38 KG/M2 | HEIGHT: 67 IN | WEIGHT: 129.88 LBS | HEART RATE: 79 BPM | DIASTOLIC BLOOD PRESSURE: 67 MMHG | TEMPERATURE: 99 F

## 2018-12-06 DIAGNOSIS — H10.30 ACUTE CONJUNCTIVITIS, UNSPECIFIED ACUTE CONJUNCTIVITIS TYPE, UNSPECIFIED LATERALITY: Primary | ICD-10-CM

## 2018-12-06 DIAGNOSIS — F41.8 SITUATIONAL ANXIETY: ICD-10-CM

## 2018-12-06 DIAGNOSIS — R10.2 PELVIC PAIN: ICD-10-CM

## 2018-12-06 PROCEDURE — 3008F BODY MASS INDEX DOCD: CPT | Mod: CPTII,S$GLB,, | Performed by: FAMILY MEDICINE

## 2018-12-06 PROCEDURE — 99999 PR PBB SHADOW E&M-EST. PATIENT-LVL IV: CPT | Mod: PBBFAC,,, | Performed by: FAMILY MEDICINE

## 2018-12-06 PROCEDURE — 99203 OFFICE O/P NEW LOW 30 MIN: CPT | Mod: S$GLB,,, | Performed by: FAMILY MEDICINE

## 2018-12-06 RX ORDER — IBUPROFEN 600 MG/1
600 TABLET ORAL EVERY 8 HOURS PRN
Qty: 90 TABLET | Refills: 1 | Status: SHIPPED | OUTPATIENT
Start: 2018-12-06 | End: 2021-06-14

## 2018-12-06 RX ORDER — OFLOXACIN 3 MG/ML
1 SOLUTION/ DROPS OPHTHALMIC 4 TIMES DAILY
Qty: 5 ML | Refills: 0 | Status: SHIPPED | OUTPATIENT
Start: 2018-12-06 | End: 2018-12-13

## 2018-12-06 RX ORDER — IBUPROFEN 600 MG/1
600 TABLET ORAL EVERY 8 HOURS PRN
Qty: 90 TABLET | Refills: 1 | Status: SHIPPED | OUTPATIENT
Start: 2018-12-06 | End: 2018-12-06 | Stop reason: SDUPTHER

## 2018-12-06 NOTE — LETTER
December 6, 2018      [facility]/ Internal Medicine  [address] [place] [address]  Phone: [phone]  Fax: [phone]       Patient: Kathe Hudson   YOB: 1980  Date of Visit: 12/06/2018    To Whom It May Concern:    Des Hudson  was at [facility] on 12/06/2018. She may return to work/school on 12/7/18 with no restrictions. If you have any questions or concerns, or if I can be of further assistance, please do not hesitate to contact me.    Sincerely,    Na Bañuelos LPN

## 2018-12-06 NOTE — PROGRESS NOTES
"[facility]  Clinic Note      Subjective:       Patient ID: Kathe Hudson is a 38 y.o. female.    Chief Complaint: Infection (right eye); Annual Exam; and Establish Care    New patient is here today for an acute visit.  She has had two days of right eye irritation, which has been getting worse today.  She does wear contacts.  She notes onset of discomfort, mild eyelid swelling, and soreness.  She has had increased tearing but no purulent drainage or discharge.  She notes some associated photophobia but no headache.  Her temperature is mildly elevated, but no fever.  She notes mild rhinorrhea, no cough or sore throat.  No vomiting or diarrhea.  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      Review of Systems   Constitutional: Negative for fatigue and fever.   HENT: Positive for rhinorrhea. Negative for congestion and sore throat.    Eyes: Positive for photophobia, pain and redness. Negative for discharge and visual disturbance.   Respiratory: Negative for cough and shortness of breath.    Cardiovascular: Negative for chest pain and palpitations.   Gastrointestinal: Negative for diarrhea, nausea and vomiting.   Genitourinary: Positive for pelvic pain (chronic, intermittent). Negative for menstrual problem.   Skin: Negative for rash and wound.   Neurological: Negative for dizziness, syncope and headaches.   Psychiatric/Behavioral: Negative for dysphoric mood. The patient is nervous/anxious.        Past medical, surgical, family, social history reviewed.     Objective:      /67 (BP Location: Left arm, Patient Position: Sitting, BP Method: Medium (Automatic))   Pulse 79   Temp 99.4 °F (37.4 °C)   Ht 5' 7" (1.702 m)   Wt 58.9 kg (129 lb 13.6 oz)   LMP 11/15/2018 (Exact Date)   SpO2 98%   BMI 20.34 kg/m²   Physical Exam   Constitutional: She is oriented to person, place, and time. She appears well-developed and well-nourished. No distress.   HENT:   Head: Normocephalic and " atraumatic.   Right Ear: Tympanic membrane and ear canal normal. Tympanic membrane is not erythematous and not retracted. No middle ear effusion.   Left Ear: Tympanic membrane and ear canal normal. Tympanic membrane is not erythematous and not retracted.  No middle ear effusion.   Nose: Nose normal. No mucosal edema or rhinorrhea.   Mouth/Throat: Oropharynx is clear and moist and mucous membranes are normal. No posterior oropharyngeal edema or posterior oropharyngeal erythema.   Eyes: EOM are normal. Pupils are equal, round, and reactive to light. Right eye exhibits chemosis. Right eye exhibits no discharge and no exudate. No foreign body present in the right eye. Left eye exhibits no chemosis, no discharge and no exudate. No foreign body present in the left eye. Right conjunctiva is injected. Right conjunctiva has no hemorrhage. Left conjunctiva is not injected. Left conjunctiva has no hemorrhage.   Neck: Normal range of motion. No thyromegaly present.   Cardiovascular: Normal rate and regular rhythm.   No murmur heard.  Pulmonary/Chest: Effort normal and breath sounds normal. No respiratory distress. She has no wheezes. She has no rales.   Abdominal: Soft. Bowel sounds are normal. She exhibits no distension. There is no tenderness.   Musculoskeletal: Normal range of motion. She exhibits no edema.   Lymphadenopathy:     She has no cervical adenopathy.   Neurological: She is alert and oriented to person, place, and time. No cranial nerve deficit.   Skin: Skin is warm and dry. No rash noted.   Psychiatric: She has a normal mood and affect.   Vitals reviewed.      Assessment:       1. Acute conjunctivitis, unspecified acute conjunctivitis type, unspecified laterality    2. Pelvic pain    3. Situational anxiety        Plan:     1. Acute conjunctivitis, unspecified acute conjunctivitis type, unspecified laterality  - exam findings reviewed, generally reassuring  - differential reviewed, likely viral conjunctivitis,  possibly related to developing URI  - basic epidemiology reviewed for conjunctivitis  - supportive care measures reviewed  - treatment options reviewed, will start empiric antibiotic and dosing instructions reviewed, advised that her infection is not necessarily bacterial but medicated eye drops are often effective for symptom relief and will allow her to return to school in 24 hours, unless worsening condition, work note provided  - - ofloxacin (OCUFLOX) 0.3 % ophthalmic solution; Place 1 drop into both eyes 4 (four) times daily. for 7 days  Dispense: 5 mL; Refill: 0  - questions answered, warning signs reviewed, patient is comfortable with this plan and was encouraged to call the office for any concerns or worsening of condition     2. Pelvic pain  - chronic condition, status post myomectomy, pain is well-controlled, will continue current regimen   - - ibuprofen (ADVIL,MOTRIN) 600 MG tablet; Take 1 tablet (600 mg total) by mouth every 8 (eight) hours as needed for Pain.  Dispense: 90 tablet; Refill: 1    3. Situational anxiety  - with some mood disturbance related to recent life stressors  - non-pharmacologic measures reviewed and recommended, including therapy and routine exercise  - - have suggested to inquire with her employee health insurance for EAP  - will place referral to [facility] but cautioned that there may be a delay before being able to make an appointment, if she finds a community therapist/psychiatrist she can call and we will forward a referral if needed, patient agrees with this plan  - - Ambulatory referral to Psychiatry     - Follow-up next week if needed, for follow-up conjunctivitis.     Jim Beard MD  12/6/2018

## 2018-12-06 NOTE — PATIENT INSTRUCTIONS
Conjunctivitis Caused by Infection     Wash hands often to help prevent spreading infection.     Infections are caused by viruses or germs (bacteria). Treatment includes keeping your eyes and hands clean. Your healthcare provider may prescribe eye drops, and tell you to stay home from work or school if youre contagious. Untreated infections can be serious. It's important to see your provider for a diagnosis.  Viral infections  A cold, flu, or other virus can spread to your eyes. This causes a watery discharge. Your eyes may burn or itch and get red. Your eyelids may also be puffy and sore.  Treatment  Most viral infections go away on their own. Artificial tears and warm compresses can relieve symptoms. Your provider may also prescribe eye drops. A viral infection can be very contagious and spreads quickly. To prevent this, wash your hands often. Use a separate tissue to wipe each eye. Dont touch your eyes or share bedding or towels.   Bacterial infections  Bacterial infections often occur in one eye. There may be a watery or a thick discharge from the eye. These infections can cause serious damage to your eye if not treated promptly.  Treatment  Your provider may prescribe eye drops or ointment to kill the bacteria. Warm compresses can help keep the eyelids clean. To keep the bacteria from spreading, wash your hands often. Use a separate tissue to wipe each eye. Dont touch your eyes or share bedding or towels.  Date Last Reviewed: 6/11/2015  © 3146-3504 "[employer]. [address] [place] [address], [place] [address]. All rights reserved. This information is not intended as a substitute for professional medical care. Always follow your healthcare professional's instructions.

## 2019-02-20 ENCOUNTER — PATIENT OUTREACH (OUTPATIENT)
Dept: ADMINISTRATIVE | Facility: HOSPITAL | Age: 39
End: 2019-02-20

## 2019-03-08 DIAGNOSIS — Z12.39 BREAST CANCER SCREENING: ICD-10-CM

## 2019-03-14 ENCOUNTER — OFFICE VISIT (OUTPATIENT)
Dept: OBSTETRICS AND GYNECOLOGY | Facility: CLINIC | Age: 39
End: 2019-03-14
Attending: OBSTETRICS & GYNECOLOGY
Payer: COMMERCIAL

## 2019-03-14 VITALS
BODY MASS INDEX: 20.62 KG/M2 | WEIGHT: 131.38 LBS | DIASTOLIC BLOOD PRESSURE: 60 MMHG | SYSTOLIC BLOOD PRESSURE: 120 MMHG | HEIGHT: 67 IN

## 2019-03-14 DIAGNOSIS — Z01.419 VISIT FOR GYNECOLOGIC EXAMINATION: Primary | ICD-10-CM

## 2019-03-14 DIAGNOSIS — Z12.31 ENCOUNTER FOR SCREENING MAMMOGRAM FOR BREAST CANCER: ICD-10-CM

## 2019-03-14 PROCEDURE — 99396 PR PREVENTIVE VISIT,EST,40-64: ICD-10-PCS | Mod: S$GLB,,, | Performed by: OBSTETRICS & GYNECOLOGY

## 2019-03-14 PROCEDURE — 99396 PREV VISIT EST AGE 40-64: CPT | Mod: S$GLB,,, | Performed by: OBSTETRICS & GYNECOLOGY

## 2019-03-14 PROCEDURE — 88175 CYTOPATH C/V AUTO FLUID REDO: CPT

## 2019-03-14 PROCEDURE — 99999 PR PBB SHADOW E&M-EST. PATIENT-LVL III: CPT | Mod: PBBFAC,,, | Performed by: OBSTETRICS & GYNECOLOGY

## 2019-03-14 PROCEDURE — 99999 PR PBB SHADOW E&M-EST. PATIENT-LVL III: ICD-10-PCS | Mod: PBBFAC,,, | Performed by: OBSTETRICS & GYNECOLOGY

## 2019-03-14 PROCEDURE — 87624 HPV HI-RISK TYP POOLED RSLT: CPT

## 2019-03-15 NOTE — PROGRESS NOTES
"CC: Well woman exam    Kathe Hudson is a 39 y.o. female  presents for a well woman exam.  She has no issues, problems, or complaints.    She is interested in evaluating her ovarian reserve    History reviewed. No pertinent past medical history.    Past Surgical History:   Procedure Laterality Date    Abdominal myomectomy via mini-laparotomy (2 fibroids  2018    pelvic pain, fibroids    MYOMECTOMY - OPEN N/A 2018    Performed by Cele [facility] at [facility]    WISDOM TOOTH EXTRACTION         OB History    Para Term  AB Living   0 0 0 0 0 0   SAB TAB Ectopic Multiple Live Births   0 0 0 0 0             Family History   Problem Relation Age of Onset    Breast cancer Maternal Grandmother         dx age unknown     Colon cancer Maternal Grandmother     Ovarian cancer Neg Hx        Social History     Tobacco Use    Smoking status: Never Smoker    Smokeless tobacco: Never Used   Substance Use Topics    Alcohol use: No    Drug use: No       /60   Ht 5' 7" (1.702 m)   Wt 59.6 kg (131 lb 6.3 oz)   LMP 2019   BMI 20.58 kg/m²     ROS:  GENERAL: Denies weight gain or weight loss. Feeling well overall.   SKIN: Denies rash or lesions.   HEAD: Denies head injury or headache.   NODES: Denies enlarged lymph nodes.   CHEST: Denies chest pain or shortness of breath.   CARDIOVASCULAR: Denies palpitations or left sided chest pain.   ABDOMEN: No abdominal pain, constipation, diarrhea, nausea, vomiting or rectal bleeding.   URINARY: No frequency, dysuria, hematuria, or burning on urination.  REPRODUCTIVE: See HPI.   BREASTS: The patient performs breast self-examination and denies pain, lumps, or nipple discharge.   HEMATOLOGIC: No easy bruisability or excessive bleeding.  MUSCULOSKELETAL: Denies joint pain or swelling.   NEUROLOGIC: Denies syncope or weakness.   PSYCHIATRIC: Denies depression, anxiety or mood swings.    Physical Exam:    APPEARANCE: Well nourished, " well developed, in no acute distress.  AFFECT: WNL, alert and oriented x 3  SKIN: No acne or hirsutism  NECK: Neck symmetric without masses or thyromegaly  NODES: No inguinal, cervical, axillary, or femoral lymph node enlargement  CHEST: Good respiratory effect  ABDOMEN: Soft.  No tenderness or masses.  No hepatosplenomegaly.  No hernias.  BREASTS: Symmetrical, no skin changes or visible lesions.  No palpable masses, nipple discharge bilaterally.  PELVIC: Normal external genitalia without lesions.  Normal hair distribution.  Adequate perineal body, normal urethral meatus.  Vagina moist and well rugated without lesions or discharge.  Cervix pink, without lesions, discharge or tenderness.  No significant cystocele or rectocele.  Bimanual exam shows uterus to be normal size, regular, mobile and nontender.  Adnexa without masses or tenderness.    EXTREMITIES: No edema.    ASSESSMENT AND PLAN  1. Visit for gynecologic examination  Liquid-based pap smear, screening    HPV High Risk Genotypes, PCR   2. Encounter for screening mammogram for breast cancer  Mammo Digital Screening Bilat       Patient was counseled today on A.C.S. Pap guidelines and recommendations for yearly pelvic exams, pap smears every 5 years with HPV co-testing, mammograms and monthly self breast exams; to see her PCP for other health maintenance.     Follow-up in about 1 year (around 3/14/2020).

## 2019-03-20 ENCOUNTER — PATIENT MESSAGE (OUTPATIENT)
Dept: OBSTETRICS AND GYNECOLOGY | Facility: CLINIC | Age: 39
End: 2019-03-20

## 2019-03-20 DIAGNOSIS — R53.82 CHRONIC FATIGUE: Primary | ICD-10-CM

## 2019-03-20 LAB
HPV HR 12 DNA CVX QL NAA+PROBE: NEGATIVE
HPV16 AG SPEC QL: NEGATIVE
HPV18 DNA SPEC QL NAA+PROBE: NEGATIVE

## 2019-03-25 ENCOUNTER — TELEPHONE (OUTPATIENT)
Dept: OBSTETRICS AND GYNECOLOGY | Facility: CLINIC | Age: 39
End: 2019-03-25

## 2019-03-25 NOTE — TELEPHONE ENCOUNTER
Attempted to contact the patient to schedule labs. No answer, left voicemail message for the patient to call the clinic back to schedule her labs.

## 2019-03-29 ENCOUNTER — LAB VISIT (OUTPATIENT)
Dept: LAB | Facility: OTHER | Age: 39
End: 2019-03-29
Attending: OBSTETRICS & GYNECOLOGY
Payer: COMMERCIAL

## 2019-03-29 DIAGNOSIS — R53.82 CHRONIC FATIGUE: ICD-10-CM

## 2019-03-29 LAB
25(OH)D3+25(OH)D2 SERPL-MCNC: 30 NG/ML (ref 30–96)
BASOPHILS # BLD AUTO: 0.02 K/UL (ref 0–0.2)
BASOPHILS NFR BLD: 0.5 % (ref 0–1.9)
DIFFERENTIAL METHOD: ABNORMAL
EOSINOPHIL # BLD AUTO: 0 K/UL (ref 0–0.5)
EOSINOPHIL NFR BLD: 0.9 % (ref 0–8)
ERYTHROCYTE [DISTWIDTH] IN BLOOD BY AUTOMATED COUNT: 15.1 % (ref 11.5–14.5)
HCT VFR BLD AUTO: 36.6 % (ref 37–48.5)
HGB BLD-MCNC: 11.8 G/DL (ref 12–16)
LYMPHOCYTES # BLD AUTO: 2 K/UL (ref 1–4.8)
LYMPHOCYTES NFR BLD: 44.1 % (ref 18–48)
MCH RBC QN AUTO: 29.1 PG (ref 27–31)
MCHC RBC AUTO-ENTMCNC: 32.2 G/DL (ref 32–36)
MCV RBC AUTO: 90 FL (ref 82–98)
MONOCYTES # BLD AUTO: 0.4 K/UL (ref 0.3–1)
MONOCYTES NFR BLD: 8.8 % (ref 4–15)
NEUTROPHILS # BLD AUTO: 2 K/UL (ref 1.8–7.7)
NEUTROPHILS NFR BLD: 45.5 % (ref 38–73)
PLATELET # BLD AUTO: 309 K/UL (ref 150–350)
PMV BLD AUTO: 11 FL (ref 9.2–12.9)
RBC # BLD AUTO: 4.05 M/UL (ref 4–5.4)
WBC # BLD AUTO: 4.42 K/UL (ref 3.9–12.7)

## 2019-03-29 PROCEDURE — 85025 COMPLETE CBC W/AUTO DIFF WBC: CPT

## 2019-03-29 PROCEDURE — 82306 VITAMIN D 25 HYDROXY: CPT

## 2019-03-29 PROCEDURE — 36415 COLL VENOUS BLD VENIPUNCTURE: CPT

## 2019-09-18 ENCOUNTER — OFFICE VISIT (OUTPATIENT)
Dept: INTERNAL MEDICINE | Facility: CLINIC | Age: 39
End: 2019-09-18
Payer: COMMERCIAL

## 2019-09-18 VITALS
HEIGHT: 67 IN | OXYGEN SATURATION: 99 % | BODY MASS INDEX: 20.36 KG/M2 | WEIGHT: 129.75 LBS | HEART RATE: 65 BPM | DIASTOLIC BLOOD PRESSURE: 70 MMHG | SYSTOLIC BLOOD PRESSURE: 100 MMHG

## 2019-09-18 DIAGNOSIS — J06.9 VIRAL URI WITH COUGH: ICD-10-CM

## 2019-09-18 DIAGNOSIS — L03.213 PRESEPTAL CELLULITIS OF LEFT EYE: Primary | ICD-10-CM

## 2019-09-18 PROCEDURE — 96372 PR INJECTION,THERAP/PROPH/DIAG2ST, IM OR SUBCUT: ICD-10-PCS | Mod: S$GLB,,, | Performed by: FAMILY MEDICINE

## 2019-09-18 PROCEDURE — 99214 PR OFFICE/OUTPT VISIT, EST, LEVL IV, 30-39 MIN: ICD-10-PCS | Mod: 25,S$GLB,, | Performed by: FAMILY MEDICINE

## 2019-09-18 PROCEDURE — 3008F PR BODY MASS INDEX (BMI) DOCUMENTED: ICD-10-PCS | Mod: CPTII,S$GLB,, | Performed by: FAMILY MEDICINE

## 2019-09-18 PROCEDURE — 96372 THER/PROPH/DIAG INJ SC/IM: CPT | Mod: S$GLB,,, | Performed by: FAMILY MEDICINE

## 2019-09-18 PROCEDURE — 99214 OFFICE O/P EST MOD 30 MIN: CPT | Mod: 25,S$GLB,, | Performed by: FAMILY MEDICINE

## 2019-09-18 PROCEDURE — 3008F BODY MASS INDEX DOCD: CPT | Mod: CPTII,S$GLB,, | Performed by: FAMILY MEDICINE

## 2019-09-18 PROCEDURE — 99999 PR PBB SHADOW E&M-EST. PATIENT-LVL III: ICD-10-PCS | Mod: PBBFAC,,, | Performed by: FAMILY MEDICINE

## 2019-09-18 PROCEDURE — 99999 PR PBB SHADOW E&M-EST. PATIENT-LVL III: CPT | Mod: PBBFAC,,, | Performed by: FAMILY MEDICINE

## 2019-09-18 RX ORDER — AMOXICILLIN 875 MG/1
875 TABLET, FILM COATED ORAL EVERY 12 HOURS
Qty: 14 TABLET | Refills: 0 | Status: SHIPPED | OUTPATIENT
Start: 2019-09-18 | End: 2019-09-25

## 2019-09-18 RX ORDER — SULFAMETHOXAZOLE AND TRIMETHOPRIM 800; 160 MG/1; MG/1
1 TABLET ORAL 2 TIMES DAILY
Qty: 14 TABLET | Refills: 0 | Status: SHIPPED | OUTPATIENT
Start: 2019-09-18 | End: 2019-09-25

## 2019-09-18 RX ORDER — OFLOXACIN 3 MG/ML
1 SOLUTION/ DROPS OPHTHALMIC 4 TIMES DAILY
COMMUNITY
End: 2019-11-27

## 2019-09-18 RX ORDER — TRIAMCINOLONE ACETONIDE 40 MG/ML
40 INJECTION, SUSPENSION INTRA-ARTICULAR; INTRAMUSCULAR
Status: COMPLETED | OUTPATIENT
Start: 2019-09-18 | End: 2019-09-18

## 2019-09-18 RX ADMIN — TRIAMCINOLONE ACETONIDE 40 MG: 40 INJECTION, SUSPENSION INTRA-ARTICULAR; INTRAMUSCULAR at 10:09

## 2019-09-18 NOTE — PROGRESS NOTES
After obtaining consent, and per orders of Dr. Beard, injection of Triamcinolone-Acetonide Lot [id number] Exp 01/2021 given in the [facility] by Guillermina Noble. Patient tolerated well and band aid applied. Patient instructed to remain in clinic for 15 minutes afterwards, and to report any adverse reaction to me immediately.

## 2019-09-18 NOTE — PROGRESS NOTES
[facility]  [facility] - Family Medicine/ Internal Medicine  Clinic Note      Subjective:       Patient ID: Kathe Hudson is a 39 y.o. female.    Chief Complaint: Sinus Problem and Eye Drainage    Patient is here today for acute visit.  She has started feeling sick yesterday, is a .  She notes onset of left eye pain, swelling, redness, and discharge.  She wears contacts, contacted the teledoc service who recommended to use allergic eye drops.  She notes overnight the pain has subsided a bit, with worsening swelling and redness, continued purulent discharge.  Eye Problem    The left eye is affected. This is a new problem. The current episode started yesterday. The problem has been gradually worsening. The pain is mild. There is no known exposure to pink eye. She wears contacts. Associated symptoms include blurred vision, an eye discharge, eye redness and a recent URI (concurrent). Pertinent negatives include no double vision, fever, nausea, photophobia or vomiting. She has tried eye drops for the symptoms. The treatment provided mild relief.   URI    This is a new problem. The current episode started in the past 7 days. The problem has been waxing and waning. Associated symptoms include congestion, coughing, headaches, a plugged ear sensation, rhinorrhea and a sore throat. Pertinent negatives include no chest pain, diarrhea, nausea, sinus pain or vomiting. She has tried decongestant for the symptoms. The treatment provided mild relief.     Review of Systems   Constitutional: Positive for fatigue. Negative for fever.   HENT: Positive for congestion, rhinorrhea and sore throat. Negative for sinus pain.    Eyes: Positive for blurred vision, discharge and redness. Negative for double vision, photophobia and pain.   Respiratory: Positive for cough. Negative for shortness of breath.    Cardiovascular: Negative for chest pain.   Gastrointestinal: Negative for diarrhea, nausea and  "vomiting.   Neurological: Positive for headaches.       Objective:      /70 (BP Location: Left arm, Patient Position: Sitting, BP Method: Medium (Manual))   Pulse 65   Ht 5' 7" (1.702 m)   Wt 58.8 kg (129 lb 11.9 oz)   LMP 09/18/2019   SpO2 99%   BMI 20.32 kg/m²   Physical Exam   Constitutional: She is oriented to person, place, and time. She appears well-developed and well-nourished. No distress.   HENT:   Head: Normocephalic and atraumatic.   Right Ear: Tympanic membrane and ear canal normal. Tympanic membrane is not erythematous and not retracted. No middle ear effusion.   Left Ear: Tympanic membrane and ear canal normal. Tympanic membrane is not erythematous and not retracted.  No middle ear effusion.   Nose: Mucosal edema and rhinorrhea present. Right sinus exhibits no maxillary sinus tenderness and no frontal sinus tenderness. Left sinus exhibits no maxillary sinus tenderness and no frontal sinus tenderness.   Mouth/Throat: Mucous membranes are normal. Posterior oropharyngeal erythema present. No posterior oropharyngeal edema.   Eyes: Pupils are equal, round, and reactive to light. Right eye exhibits no chemosis, no discharge and no exudate. Left eye exhibits chemosis and discharge. Left eye exhibits no exudate. Right conjunctiva is not injected. Right conjunctiva has no hemorrhage. Left conjunctiva is injected. Left conjunctiva has no hemorrhage. Right eye exhibits normal extraocular motion. Left eye exhibits normal extraocular motion.   Mild left periorbital swelling, no pain with EOM   Neck: Normal range of motion.   Cardiovascular: Normal rate and regular rhythm.   No murmur heard.  Pulmonary/Chest: Effort normal and breath sounds normal. No tachypnea. No respiratory distress. She has no wheezes. She has no rhonchi. She has no rales.   Abdominal: Soft. Bowel sounds are normal. She exhibits no distension. There is no tenderness.   Musculoskeletal: Normal range of motion.   Lymphadenopathy:     " She has no cervical adenopathy.   Neurological: She is alert and oriented to person, place, and time.   Skin: Skin is warm and dry. No rash noted.   Psychiatric: She has a normal mood and affect.   Vitals reviewed.      Assessment:       1. Preseptal cellulitis of left eye    2. Viral URI with cough        Plan:     1. Preseptal cellulitis of left eye  2. Viral URI with cough  - history and exam findings reviewed  - differential reviewed, presentation is most consistent with acute viral URI, but with worsening left eye symptoms consistent with preseptal cellulitis  - supportive care measures reviewed, have recommended increased oral fluids and judicious use of OTC cough and cold remedies, handout provided  - treatment options reviewed, will start empiric course of antibiotics to cover eye infection, will provide with steroid dose IM to help relieve her sinus/URI symptoms, dosing instructions and potential side effects reviewed, patient expressed understanding  - - amoxicillin (AMOXIL) 875 MG tablet; Take 1 tablet (875 mg total) by mouth every 12 (twelve) hours. for 7 days  Dispense: 14 tablet; Refill: 0  - - sulfamethoxazole-trimethoprim 800-160mg (BACTRIM DS) 800-160 mg Tab; Take 1 tablet by mouth 2 (two) times daily. for 7 days  Dispense: 14 tablet; Refill: 0  - - triamcinolone acetonide injection 40 mg   - questions answered, ER warning signs reviewed, patient is comfortable with plan to monitor condition and was encouraged to call the office for any concerns or worsening of condition     - Follow up in 3-4 days if needed, for follow-up eye infection and URI prior to travel.     Jim Beard MD  9/18/2019          Medication List with Changes/Refills   New Medications    AMOXICILLIN (AMOXIL) 875 MG TABLET    Take 1 tablet (875 mg total) by mouth every 12 (twelve) hours. for 7 days    SULFAMETHOXAZOLE-TRIMETHOPRIM 800-160MG (BACTRIM DS) 800-160 MG TAB    Take 1 tablet by mouth 2 (two) times daily. for 7 days    Current Medications    IBUPROFEN (ADVIL,MOTRIN) 600 MG TABLET    Take 1 tablet (600 mg total) by mouth every 8 (eight) hours as needed for Pain.    OFLOXACIN (OCUFLOX) 0.3 % OPHTHALMIC SOLUTION    Place 1 drop into both eyes 4 (four) times daily.

## 2019-11-27 ENCOUNTER — OFFICE VISIT (OUTPATIENT)
Dept: INTERNAL MEDICINE | Facility: CLINIC | Age: 39
End: 2019-11-27
Payer: COMMERCIAL

## 2019-11-27 VITALS
OXYGEN SATURATION: 99 % | DIASTOLIC BLOOD PRESSURE: 66 MMHG | SYSTOLIC BLOOD PRESSURE: 100 MMHG | BODY MASS INDEX: 20.9 KG/M2 | WEIGHT: 133.19 LBS | HEIGHT: 67 IN | HEART RATE: 69 BPM | TEMPERATURE: 99 F

## 2019-11-27 DIAGNOSIS — R55 SYNCOPE AND COLLAPSE: Primary | ICD-10-CM

## 2019-11-27 DIAGNOSIS — R22.41 MASS OF RIGHT LOWER LEG: ICD-10-CM

## 2019-11-27 DIAGNOSIS — Z13.220 SCREENING FOR LIPID DISORDERS: ICD-10-CM

## 2019-11-27 LAB
ALBUMIN SERPL BCP-MCNC: 3.8 G/DL (ref 3.5–5.2)
ALP SERPL-CCNC: 45 U/L (ref 55–135)
ALT SERPL W/O P-5'-P-CCNC: 11 U/L (ref 10–44)
ANION GAP SERPL CALC-SCNC: 8 MMOL/L (ref 8–16)
AST SERPL-CCNC: 16 U/L (ref 10–40)
BASOPHILS # BLD AUTO: 0.03 K/UL (ref 0–0.2)
BASOPHILS NFR BLD: 0.4 % (ref 0–1.9)
BILIRUB SERPL-MCNC: 0.4 MG/DL (ref 0.1–1)
BUN SERPL-MCNC: 10 MG/DL (ref 6–20)
CALCIUM SERPL-MCNC: 9.5 MG/DL (ref 8.7–10.5)
CHLORIDE SERPL-SCNC: 106 MMOL/L (ref 95–110)
CHOLEST SERPL-MCNC: 162 MG/DL (ref 120–199)
CHOLEST/HDLC SERPL: 3.3 {RATIO} (ref 2–5)
CO2 SERPL-SCNC: 25 MMOL/L (ref 23–29)
CREAT SERPL-MCNC: 0.8 MG/DL (ref 0.5–1.4)
DIFFERENTIAL METHOD: ABNORMAL
EOSINOPHIL # BLD AUTO: 0 K/UL (ref 0–0.5)
EOSINOPHIL NFR BLD: 0.3 % (ref 0–8)
ERYTHROCYTE [DISTWIDTH] IN BLOOD BY AUTOMATED COUNT: 13.8 % (ref 11.5–14.5)
EST. GFR  (AFRICAN AMERICAN): >60 ML/MIN/1.73 M^2
EST. GFR  (NON AFRICAN AMERICAN): >60 ML/MIN/1.73 M^2
FERRITIN SERPL-MCNC: 6 NG/ML (ref 20–300)
GLUCOSE SERPL-MCNC: 110 MG/DL (ref 70–110)
HCT VFR BLD AUTO: 41.3 % (ref 37–48.5)
HDLC SERPL-MCNC: 49 MG/DL (ref 40–75)
HDLC SERPL: 30.2 % (ref 20–50)
HGB BLD-MCNC: 13 G/DL (ref 12–16)
IMM GRANULOCYTES # BLD AUTO: 0.01 K/UL (ref 0–0.04)
IMM GRANULOCYTES NFR BLD AUTO: 0.1 % (ref 0–0.5)
LDLC SERPL CALC-MCNC: 94 MG/DL (ref 63–159)
LYMPHOCYTES # BLD AUTO: 2.4 K/UL (ref 1–4.8)
LYMPHOCYTES NFR BLD: 30.9 % (ref 18–48)
MCH RBC QN AUTO: 29.5 PG (ref 27–31)
MCHC RBC AUTO-ENTMCNC: 31.5 G/DL (ref 32–36)
MCV RBC AUTO: 94 FL (ref 82–98)
MONOCYTES # BLD AUTO: 0.6 K/UL (ref 0.3–1)
MONOCYTES NFR BLD: 7.6 % (ref 4–15)
NEUTROPHILS # BLD AUTO: 4.7 K/UL (ref 1.8–7.7)
NEUTROPHILS NFR BLD: 60.7 % (ref 38–73)
NONHDLC SERPL-MCNC: 113 MG/DL
NRBC BLD-RTO: 0 /100 WBC
PLATELET # BLD AUTO: 278 K/UL (ref 150–350)
PMV BLD AUTO: 11.1 FL (ref 9.2–12.9)
POTASSIUM SERPL-SCNC: 3.9 MMOL/L (ref 3.5–5.1)
PROT SERPL-MCNC: 7.3 G/DL (ref 6–8.4)
RBC # BLD AUTO: 4.4 M/UL (ref 4–5.4)
SODIUM SERPL-SCNC: 139 MMOL/L (ref 136–145)
TRIGL SERPL-MCNC: 95 MG/DL (ref 30–150)
TSH SERPL DL<=0.005 MIU/L-ACNC: 1.81 UIU/ML (ref 0.4–4)
WBC # BLD AUTO: 7.67 K/UL (ref 3.9–12.7)

## 2019-11-27 PROCEDURE — 80053 COMPREHEN METABOLIC PANEL: CPT

## 2019-11-27 PROCEDURE — 99999 PR PBB SHADOW E&M-EST. PATIENT-LVL IV: ICD-10-PCS | Mod: PBBFAC,,, | Performed by: FAMILY MEDICINE

## 2019-11-27 PROCEDURE — 85025 COMPLETE CBC W/AUTO DIFF WBC: CPT

## 2019-11-27 PROCEDURE — 36415 COLL VENOUS BLD VENIPUNCTURE: CPT

## 2019-11-27 PROCEDURE — 3008F PR BODY MASS INDEX (BMI) DOCUMENTED: ICD-10-PCS | Mod: CPTII,S$GLB,, | Performed by: FAMILY MEDICINE

## 2019-11-27 PROCEDURE — 82728 ASSAY OF FERRITIN: CPT

## 2019-11-27 PROCEDURE — 99214 OFFICE O/P EST MOD 30 MIN: CPT | Mod: S$GLB,,, | Performed by: FAMILY MEDICINE

## 2019-11-27 PROCEDURE — 99999 PR PBB SHADOW E&M-EST. PATIENT-LVL IV: CPT | Mod: PBBFAC,,, | Performed by: FAMILY MEDICINE

## 2019-11-27 PROCEDURE — 84443 ASSAY THYROID STIM HORMONE: CPT

## 2019-11-27 PROCEDURE — 3008F BODY MASS INDEX DOCD: CPT | Mod: CPTII,S$GLB,, | Performed by: FAMILY MEDICINE

## 2019-11-27 PROCEDURE — 99214 PR OFFICE/OUTPT VISIT, EST, LEVL IV, 30-39 MIN: ICD-10-PCS | Mod: S$GLB,,, | Performed by: FAMILY MEDICINE

## 2019-11-27 PROCEDURE — 80061 LIPID PANEL: CPT

## 2019-11-27 NOTE — PROGRESS NOTES
[facility]  [facility]/ Internal Medicine  Clinic Note      Subjective:       Patient ID: Kathe Hudson is a 39 y.o. female.    Chief Complaint: lump on right leg    Patient is here today for an acute visit.  She describes onset of leg cramping in the last week, culminating in an isolated syncopal event several days ago.  This is not her primary concern however, and notes instead that as she was massaging her right leg to relieve the cramping, she noticed a bump on the outside of her right leg.  The area is not painful nor tender, and has not changed in the interim.  She has not noticed this mass before.  She has no history of other syncopal events, and displayed no seizure activity or confusion after the event.  No head or associated trauma.  Loss of Consciousness   This is a new problem. The current episode started in the past 7 days. Progression since onset: once. She lost consciousness for a period of less than 1 minute. The symptoms are aggravated by normal activity and unknown factors. Pertinent negatives include no auditory change, aura, bladder incontinence, chest pain, confusion, dizziness, fever, focal sensory loss, headaches, light-headedness, nausea, palpitations, vertigo or vomiting. There is no history of CAD, CVA or seizures.     Review of Systems   Constitutional: Negative for fatigue and fever.   HENT: Negative for rhinorrhea and sore throat.    Respiratory: Negative for cough and shortness of breath.    Cardiovascular: Positive for syncope. Negative for chest pain and palpitations.   Gastrointestinal: Negative for diarrhea, nausea and vomiting.   Genitourinary: Negative for bladder incontinence.   Musculoskeletal: Negative for arthralgias and myalgias.   Skin: Negative for rash and wound.   Neurological: Positive for syncope. Negative for dizziness, vertigo, seizures, light-headedness and headaches.   Psychiatric/Behavioral: Negative for confusion.      "  Objective:      /66 (BP Location: Right arm, Patient Position: Sitting, BP Method: Medium (Manual))   Pulse 69   Temp 98.7 °F (37.1 °C)   Ht 5' 7" (1.702 m)   Wt 60.4 kg (133 lb 2.5 oz)   LMP 11/07/2019   SpO2 99%   BMI 20.86 kg/m²   Physical Exam   Constitutional: She is oriented to person, place, and time. She appears well-developed and well-nourished. No distress.   HENT:   Head: Normocephalic and atraumatic.   Neck: Normal range of motion. No thyromegaly present.   Cardiovascular: Normal rate and regular rhythm.   No murmur heard.  Pulmonary/Chest: Effort normal and breath sounds normal. No respiratory distress. She has no wheezes. She has no rales.   Abdominal: Soft. Bowel sounds are normal. She exhibits no distension. There is no tenderness.   Musculoskeletal: Normal range of motion. She exhibits no edema or tenderness (no posterior calf TTP).   Neurological: She is alert and oriented to person, place, and time. No cranial nerve deficit or sensory deficit. She exhibits normal muscle tone.   Skin: Skin is warm and dry. No rash noted.   Well-circumscribed soft tissue mass on outer aspect of right lower leg, fatty consistency, non-tender, soft, immobile, non-fluctuant   Psychiatric: She has a normal mood and affect.   Vitals reviewed.      Assessment:       1. Syncope and collapse    2. Mass of right lower leg    3. Screening for lipid disorders        Plan:     1. Syncope and collapse  - history and exam findings reviewed, will check labs and carotid ultrasound/echocardiogram to evaluate syncopal event  - - CBC auto differential  - - Comprehensive metabolic panel  - - Echo Color Flow Doppler? Yes; Future  - - CV Ultrasound Bilateral Doppler Carotid; Future  - - TSH  - - Ferritin  - supportive care and preventive measures reviewed  - treatment options reviewed,     2. Mass of right lower leg  - history and exam findings reviewed, reassurance provided, exam is most consistent with lipoma, have " advised that she has no active signs/symptoms or known risk factors for VTE  - will check ultrasound to further characterize the lesion, but have recommended to defer Doppler for now as we are more interested in the mass rather than possible blood clot, patient expressed understanding  - - US Extremity Non Vascular Complete Right; Future    3. Screening for lipid disorders  - Lipid panel     - Follow up in the next 1-2 as needed, for follow-up syncope.     Jim Beard MD  11/27/2019          Medication List with Changes/Refills   Current Medications    IBUPROFEN (ADVIL,MOTRIN) 600 MG TABLET    Take 1 tablet (600 mg total) by mouth every 8 (eight) hours as needed for Pain.   Discontinued Medications    OFLOXACIN (OCUFLOX) 0.3 % OPHTHALMIC SOLUTION    Place 1 drop into both eyes 4 (four) times daily.

## 2019-11-27 NOTE — PATIENT INSTRUCTIONS
Causes of Syncope  Syncope (fainting) has many causes. Sometimes it is not serious. In other cases, syncope is a sign of a heart problem. But treatment can help    When syncope is not serious  Your healthcare provider may call your problem vasovagal syncope, reflex syncope, or orthostatic hypotension. These types of syncope are generally not serious. They can be caused by:  · Strong feelings, such as anxiety or fear. A nerve signal may briefly change your heart rate and lower your blood pressure too much.  · Standing for too long. Standing may cause blood to pool in your legs. When this happens, your brain may not receive all the blood it needs.  · Standing up too quickly. Your blood pressure may not adjust fast enough to changes in posture and may drop too low. Certain medicines can also cause this problem. Examples of medicines that can cause a drop in blood pressure include diuretics, blood pressure medicines, and medicines for chest pain. Your pharmacist or healthcare provider can discuss these with you.  · Reaction to normal body functions. When you go to the bathroom, have gastrointestinal discomfort, nausea, or pain, your heart may have a natural reflex to slow down and lower blood pressure. This can result in syncope. This may also follow exercise, eating, laughter, weight lifting, or playing musical instruments like the trumpet or trombone.  When heart trouble causes syncope  A heart problem can decrease the amount of oxygen-rich blood that reaches the brain. Heart trouble can be serious and even life threatening if not treated:  · A slow heart rate. Electrical signals tell the chambers of the heart when to pump. But the signals may be slowed or blocked (heart block) as they travel on the hearts electrical pathways. This can be caused by aging, scarred heart tissue, or damage from heart disease. When the heart rate slows, not enough blood is pumped.  · A fast heart rate. Certain problems can make the  heart race. For instance, after a heart attack, also known as acute myocardial infarction, or AMI, abnormal electrical signals may be created. These signals can make the heart suddenly beat very fast. The heart pumps before the chambers can fill with blood. So less blood reaches the brain and other parts of the body. Illegal drugs, certain medicines, heart disease, or an inherited condition can also cause this.  · A heart valve problem. Blood travels through the chambers of the heart as it is pumped. Heart valves open and close to help move blood in the right direction. But a valve may not open or close fully, if its hardened or scarred. As a result, less blood is pumped through the heart to the brain and body. Most often, syncope occurs when a person's aortic valve is critically narrowed and he or she participates in  a strenuous activity.  · A heart muscle problem. Some people develop a thickened heart muscle that blocks blood flow out of the heart to the body. This is called hypertrophic cardiomyopathy. Being dehydrated and having hypertrophic cardiomyopathy can increase the risk for syncope.  Whatever the cause of syncope, it is important to be evaluated by your healthcare provider. You may need to be seen by a cardiologist, neurologist, or an ear, nose, and throat specialist. Do not drive, operate heavy machinery, or participate in activities in which you would be at risk for falls and injury if you have syncope and have not been evaluated.  Date Last Reviewed: 5/1/2016  © 2956-3901 [employer]. [address] [place] [address], [place] [address]. All rights reserved. This information is not intended as a substitute for professional medical care. Always follow your healthcare professional's instructions.

## 2019-12-02 ENCOUNTER — TELEPHONE (OUTPATIENT)
Dept: INTERNAL MEDICINE | Facility: CLINIC | Age: 39
End: 2019-12-02

## 2019-12-02 NOTE — TELEPHONE ENCOUNTER
"MyChart message sent.  Please let the patient know their results, thank you:    " Hi Ms. Hudson,    I have reviewed your recent results.  Your blood counts are all within acceptable limits and do not show any anemia, but your iron level is quite low.  A daily over-the-counter iron supplement (ferrous sulfate 325 mg) would be recommended to help correct the iron deficiency.  Otherwise, your electrolytes, blood sugar, and kidney/liver/thyroid function are all normal, as well as your cholesterol levels.  These lab results are reassuring, but do not show any reason to explain why you passed out.  We can proceed with the ultrasounds if you would like, and please contact the office if you have any additional questions or concerns.    Jim Beard MD      "

## 2019-12-06 ENCOUNTER — HOSPITAL ENCOUNTER (OUTPATIENT)
Dept: RADIOLOGY | Facility: HOSPITAL | Age: 39
Discharge: HOME | End: 2019-12-06
Attending: FAMILY MEDICINE
Payer: COMMERCIAL

## 2019-12-06 DIAGNOSIS — R22.41 MASS OF RIGHT LOWER LEG: ICD-10-CM

## 2019-12-06 PROCEDURE — 76881 US EXTREMITY NON VASCULAR COMPLETE RIGHT: ICD-10-PCS | Mod: 26,RT,, | Performed by: RADIOLOGY

## 2019-12-06 PROCEDURE — 76881 US COMPL JOINT R-T W/IMG: CPT | Mod: 26,RT,, | Performed by: RADIOLOGY

## 2019-12-06 PROCEDURE — 76881 US COMPL JOINT R-T W/IMG: CPT | Mod: TC,RT

## 2019-12-07 ENCOUNTER — TELEPHONE (OUTPATIENT)
Dept: INTERNAL MEDICINE | Facility: CLINIC | Age: 39
End: 2019-12-07

## 2019-12-07 NOTE — TELEPHONE ENCOUNTER
"MyChart message sent.  Please let the patient know their results, thank you:    " Hi Ms. Hudson,    I have reviewed the report from your recent ultrasound.  The results were entirely normal, and nothing worrisome was seen in the area that you could feel on the leg.  If the spot returns, we would consider re-evaluation in clinic or with additional imaging.  Please contact the office if you have any additional questions or concerns.    Jim Beard MD      "

## 2019-12-15 ENCOUNTER — PATIENT MESSAGE (OUTPATIENT)
Dept: INTERNAL MEDICINE | Facility: CLINIC | Age: 39
End: 2019-12-15

## 2020-05-14 DIAGNOSIS — Z12.39 BREAST CANCER SCREENING: ICD-10-CM

## 2020-07-08 ENCOUNTER — PATIENT OUTREACH (OUTPATIENT)
Dept: ADMINISTRATIVE | Facility: OTHER | Age: 40
End: 2020-07-08

## 2020-07-08 NOTE — PROGRESS NOTES
Requested updates within Care Everywhere.  Patient's chart was reviewed for overdue BOAZ topics.  Immunizations failed. Not in LINKS

## 2020-07-09 ENCOUNTER — OFFICE VISIT (OUTPATIENT)
Dept: OBSTETRICS AND GYNECOLOGY | Facility: CLINIC | Age: 40
End: 2020-07-09
Payer: COMMERCIAL

## 2020-07-09 VITALS
HEIGHT: 67 IN | BODY MASS INDEX: 21.56 KG/M2 | DIASTOLIC BLOOD PRESSURE: 80 MMHG | WEIGHT: 137.38 LBS | SYSTOLIC BLOOD PRESSURE: 124 MMHG

## 2020-07-09 DIAGNOSIS — Z86.018 HISTORY OF UTERINE FIBROID: Primary | ICD-10-CM

## 2020-07-09 DIAGNOSIS — R14.0 ABDOMINAL BLOATING: ICD-10-CM

## 2020-07-09 PROCEDURE — 99213 PR OFFICE/OUTPT VISIT, EST, LEVL III, 20-29 MIN: ICD-10-PCS | Mod: S$GLB,,, | Performed by: OBSTETRICS & GYNECOLOGY

## 2020-07-09 PROCEDURE — 3008F BODY MASS INDEX DOCD: CPT | Mod: CPTII,S$GLB,, | Performed by: OBSTETRICS & GYNECOLOGY

## 2020-07-09 PROCEDURE — 99999 PR PBB SHADOW E&M-EST. PATIENT-LVL III: CPT | Mod: PBBFAC,,, | Performed by: OBSTETRICS & GYNECOLOGY

## 2020-07-09 PROCEDURE — 99999 PR PBB SHADOW E&M-EST. PATIENT-LVL III: ICD-10-PCS | Mod: PBBFAC,,, | Performed by: OBSTETRICS & GYNECOLOGY

## 2020-07-09 PROCEDURE — 99213 OFFICE O/P EST LOW 20 MIN: CPT | Mod: S$GLB,,, | Performed by: OBSTETRICS & GYNECOLOGY

## 2020-07-09 PROCEDURE — 3008F PR BODY MASS INDEX (BMI) DOCUMENTED: ICD-10-PCS | Mod: CPTII,S$GLB,, | Performed by: OBSTETRICS & GYNECOLOGY

## 2020-07-09 NOTE — PATIENT INSTRUCTIONS
What Are Fibroids?  Fibroids are growths made up of connective tissue and muscle cells that usually form in the wall of your uterus. Other names for fibroids are myomas and leiomyomas. Fibroids are the most common tumor in women. They are almost always noncancerous (benign) and harmless. Fibroids start as pea-sized lumps, but can grow steadily during your reproductive years. Many fibroids just need to be watched. Others may need treatment if they become too large or cause symptoms.    Potential problems  Fibroids often cause no symptoms. But a fibroid that grows quickly in your uterus can cause 1 or more of the following problems:  · Excessive uterine bleeding, leading to anemia (lack of red blood cells)  · Frequent urge to urinate  · Difficulty having bowel movements  · Achiness, heaviness, or fullness  · Back or abdominal pain  · Pain during intercourse  · Difficulty getting pregnant or being unable to get pregnant  · Problems with pregnancy  · Enlargement of the lower abdomen  Treatment is tailored for you  No 2 fibroids are the same. The type of treatment you will have depends on their number, size, location, and rate of growth. Your treatment decision also depends on the severity of your symptoms and whether or not you plan to have children in the future. There are a growing number of effective ways to treat fibroids. After your medical evaluation, your health care provider will be able to discuss with you the best options to solve your particular problem and meet your needs.  Your future checkups  Treating your fibroids is likely to relieve your symptoms. But your health care provider will want to check your progress. Ask your health care provider about any additional follow-up visits you might need.   Date Last Reviewed: 5/10/2015  © 6310-1930 [employer]. [address] [place] [address], [place] [address]. All rights reserved. This information is not intended as a substitute for professional medical  care. Always follow your healthcare professional's instructions.      Constipation (Adult)  Constipation means that you have bowel movements that are less frequent than usual. Stools often become very hard and difficult to pass.  Constipation is very common. At some point in life it affects almost everyone. Since everyone's bowel habits are different, what is constipation to one person may not be to another. Your healthcare provider may do tests to diagnose constipation. It depends on what he or she finds when evaluating you.    Symptoms of constipation include:  · Abdominal pain  · Bloating  · Vomiting  · Painful bowel movements  · Itching, swelling, bleeding, or pain around the anus  Causes  Constipation can have many causes. These include:  · Diet low in fiber  · Too much dairy  · Not drinking enough liquids  · Lack of exercise or physical activity. This is especially true for older adults.  · Changes in lifestyle or daily routine, including pregnancy, aging, work, and travel  · Frequent use or misuse of laxatives  · Ignoring the urge to have a bowel movement or delaying it until later  · Medicines, such as certain prescription pain medicines, iron supplements, antacids, certain antidepressants, and calcium supplements  · Diseases like irritable bowel syndrome, bowel obstructions, stroke, diabetes, thyroid disease, Parkinson disease, hemorrhoids, and colon cancer  Complications  Potential complications of constipation can include:  · Hemorrhoids  · Rectal bleeding from hemorrhoids or anal fissures (skin tears)  · Hernias  · Dependency on laxatives  · Chronic constipation  · Fecal impaction  · Bowel obstruction or perforation  Home care  All treatment should be done after talking with your healthcare provider. This is especially true if you have another medical problems, are taking prescription medicines, or are an older adult. Treatment most often involves lifestyle changes. You may also need medicines. Your  healthcare provider will tell you which will work best for you. Follow the advice below to help avoid this problem in the future.  Lifestyle changes  These lifestyle changes can help prevent constipation:  · Diet. Eat a high-fiber diet, with fresh fruit and vegetables, and reduce dairy intake, meats, and processed foods  · Fluids. It's important to get enough fluids each day. Drink plenty of water when you eat more fiber. If you are on diet that limits the amount of fluid you can have, talk about this with your healthcare provider.  · Regular exercise. Check with your healthcare provider first.  Medications  Take any medicines as directed. Some laxatives are safe to use only every now and then. Others can be taken on a regular basis. Talk with your doctor or pharmacist if you have questions.  Prescription pain medicines can cause constipation. If you are taking this kind of medicine, ask your healthcare provider if you should also take a stool softener.  Medicines you may take to treat constipation include:  · Fiber supplements  · Stool softeners  · Laxatives  · Enemas  · Rectal suppositories  Follow-up care  Follow up with your healthcare provider if symptoms don't get better in the next few days. You may need to have more tests or see a specialist.  Call 911  Call 911 if any of these occur:  · Trouble breathing  · Stiff, rigid abdomen that is severely painful to touch  · Confusion  · Fainting or loss of consciousness  · Rapid heart rate  · Chest pain  When to seek medical advice  Call your healthcare provider right away if any of these occur:  · Fever over 100.4°F (38°C)  · Failure to resume normal bowel movements  · Pain in your abdomen or back gets worse  · Nausea or vomiting  · Swelling in your abdomen  · Blood in the stool  · Black, tarry stool  · Involuntary weight loss  · Weakness  Date Last Reviewed: 12/30/2015  © 1700-2508 [employer]. [address] [place] [address], [place] [address]. All rights  reserved. This information is not intended as a substitute for professional medical care. Always follow your healthcare professional's instructions.

## 2020-07-09 NOTE — PROGRESS NOTES
History & Physical  Gynecology      SUBJECTIVE:     Chief Complaint: Consult (bloating can't lose weight wants a U/S)       History of Present Illness:  Ms. Hudson is a 39 y/o female who presents to clinic to discuss abdominal pain, constipation, bloating x1 month. She reports that she usually has a bowel 1-2 a day but now it is difficult to have  a bowel movement 1-2 x a week. She reports that she is also experiencing bloating. She reports that the last time she felt this way she had a large uterine fibroid. Patient underwent an abdominal myomectomy in 2018 at which time a 406g uterine fibroid was removed. Patient reports that she is concerned that she have. another fibroid    Review of patient's allergies indicates:  No Known Allergies    History reviewed. No pertinent past medical history.  Past Surgical History:   Procedure Laterality Date    Abdominal myomectomy via mini-laparotomy (2 fibroids  2018    pelvic pain, fibroids    WISDOM TOOTH EXTRACTION       OB History        0    Para   0    Term   0       0    AB   0    Living   0       SAB   0    TAB   0    Ectopic   0    Multiple   0    Live Births   0               Family History   Problem Relation Age of Onset    Breast cancer Maternal Grandmother         dx age unknown     Colon cancer Maternal Grandmother     Ovarian cancer Neg Hx      Social History     Tobacco Use    Smoking status: Never Smoker    Smokeless tobacco: Never Used   Substance Use Topics    Alcohol use: No    Drug use: No       Current Outpatient Medications   Medication Sig    ibuprofen (ADVIL,MOTRIN) 600 MG tablet Take 1 tablet (600 mg total) by mouth every 8 (eight) hours as needed for Pain.     No current facility-administered medications for this visit.          Review of Systems:  Review of Systems   Constitutional: Negative for chills and fever.   Eyes: Negative for visual disturbance.   Respiratory: Negative for cough and wheezing.    Cardiovascular:  Negative for chest pain and palpitations.   Gastrointestinal: Positive for bloating and constipation. Negative for abdominal pain, nausea and vomiting.   Genitourinary: Negative for dysuria, frequency, hematuria, pelvic pain, vaginal bleeding, vaginal discharge and vaginal pain.   Neurological: Negative for headaches.   Psychiatric/Behavioral: Negative for depression.        OBJECTIVE:     Physical Exam:  Physical Exam  Vitals signs and nursing note reviewed.   Constitutional:       Appearance: She is well-developed.   Cardiovascular:      Rate and Rhythm: Normal rate.   Pulmonary:      Effort: Pulmonary effort is normal. No respiratory distress.   Abdominal:      General: There is no distension.      Palpations: Abdomen is soft.      Tenderness: There is no abdominal tenderness.   Genitourinary:     Exam position: Supine.   Skin:     General: Skin is warm and dry.   Neurological:      Mental Status: She is oriented to person, place, and time.           ASSESSMENT:       ICD-10-CM ICD-9-CM    1. History of uterine fibroid  Z86.018 V13.29 US Pelvis Comp with Transvag NON-OB (xpd   2. Abdominal bloating  R14.0 787.3 US Pelvis Comp with Transvag NON-OB (xpd     Plan:      Kathe was seen today for consult.    Diagnoses and all orders for this visit:    History of uterine fibroid  -     US Pelvis Comp with Transvag NON-OB (xpd; Future  - History of myomectomy in 2018; Abdominal myomectomy in 2018 with 406g fibroid  - Bloating and severe constipation x1 month as she had with myomectomy in the past    Abdominal bloating  -     US Pelvis Comp with Transvag NON-OB (xpd; Future        Orders Placed This Encounter   Procedures    US Pelvis Comp with Transvag NON-OB (xpd       Follow up in about 4 weeks (around 8/6/2020).    Counseling time: 15 minutes    Aby Huggins

## 2020-07-10 ENCOUNTER — HOSPITAL ENCOUNTER (OUTPATIENT)
Dept: RADIOLOGY | Facility: HOSPITAL | Age: 40
Discharge: HOME | End: 2020-07-10
Attending: OBSTETRICS & GYNECOLOGY
Payer: COMMERCIAL

## 2020-07-10 DIAGNOSIS — R14.0 ABDOMINAL BLOATING: ICD-10-CM

## 2020-07-10 DIAGNOSIS — Z86.018 HISTORY OF UTERINE FIBROID: ICD-10-CM

## 2020-07-10 PROCEDURE — 76856 US EXAM PELVIC COMPLETE: CPT | Mod: TC

## 2020-07-10 PROCEDURE — 76856 US PELVIS COMPLETE NON OB: ICD-10-PCS | Mod: 26,,, | Performed by: RADIOLOGY

## 2020-07-10 PROCEDURE — 76856 US EXAM PELVIC COMPLETE: CPT | Mod: 26,,, | Performed by: RADIOLOGY

## 2020-07-31 ENCOUNTER — OFFICE VISIT (OUTPATIENT)
Dept: INTERNAL MEDICINE | Facility: CLINIC | Age: 40
End: 2020-07-31
Payer: COMMERCIAL

## 2020-07-31 ENCOUNTER — LAB VISIT (OUTPATIENT)
Dept: LAB | Facility: HOSPITAL | Age: 40
End: 2020-07-31
Payer: COMMERCIAL

## 2020-07-31 VITALS
TEMPERATURE: 98 F | WEIGHT: 140.44 LBS | HEART RATE: 55 BPM | DIASTOLIC BLOOD PRESSURE: 86 MMHG | BODY MASS INDEX: 22.04 KG/M2 | HEIGHT: 67 IN | SYSTOLIC BLOOD PRESSURE: 130 MMHG

## 2020-07-31 DIAGNOSIS — D50.8 IRON DEFICIENCY ANEMIA SECONDARY TO INADEQUATE DIETARY IRON INTAKE: ICD-10-CM

## 2020-07-31 DIAGNOSIS — R10.9 FLANK PAIN: ICD-10-CM

## 2020-07-31 DIAGNOSIS — R10.9 FLANK PAIN: Primary | ICD-10-CM

## 2020-07-31 LAB
ANION GAP SERPL CALC-SCNC: 8 MMOL/L (ref 8–16)
BASOPHILS # BLD AUTO: 0.03 K/UL (ref 0–0.2)
BASOPHILS NFR BLD: 0.5 % (ref 0–1.9)
BILIRUB UR QL STRIP: NEGATIVE
BUN SERPL-MCNC: 4 MG/DL (ref 6–20)
CALCIUM SERPL-MCNC: 9.9 MG/DL (ref 8.7–10.5)
CHLORIDE SERPL-SCNC: 103 MMOL/L (ref 95–110)
CLARITY UR REFRACT.AUTO: CLEAR
CO2 SERPL-SCNC: 27 MMOL/L (ref 23–29)
COLOR UR AUTO: COLORLESS
CREAT SERPL-MCNC: 0.8 MG/DL (ref 0.5–1.4)
CRP SERPL-MCNC: 0.2 MG/L (ref 0–8.2)
DIFFERENTIAL METHOD: NORMAL
EOSINOPHIL # BLD AUTO: 0.1 K/UL (ref 0–0.5)
EOSINOPHIL NFR BLD: 0.9 % (ref 0–8)
ERYTHROCYTE [DISTWIDTH] IN BLOOD BY AUTOMATED COUNT: 13.1 % (ref 11.5–14.5)
ERYTHROCYTE [SEDIMENTATION RATE] IN BLOOD BY WESTERGREN METHOD: 6 MM/HR (ref 0–36)
EST. GFR  (AFRICAN AMERICAN): >60 ML/MIN/1.73 M^2
EST. GFR  (NON AFRICAN AMERICAN): >60 ML/MIN/1.73 M^2
FERRITIN SERPL-MCNC: 8 NG/ML (ref 20–300)
GLUCOSE SERPL-MCNC: 80 MG/DL (ref 70–110)
GLUCOSE UR QL STRIP: NEGATIVE
HCT VFR BLD AUTO: 39.6 % (ref 37–48.5)
HGB BLD-MCNC: 12.8 G/DL (ref 12–16)
HGB UR QL STRIP: NEGATIVE
IMM GRANULOCYTES # BLD AUTO: 0.02 K/UL (ref 0–0.04)
IMM GRANULOCYTES NFR BLD AUTO: 0.3 % (ref 0–0.5)
IRON SERPL-MCNC: 135 UG/DL (ref 30–160)
KETONES UR QL STRIP: NEGATIVE
LEUKOCYTE ESTERASE UR QL STRIP: NEGATIVE
LYMPHOCYTES # BLD AUTO: 2.3 K/UL (ref 1–4.8)
LYMPHOCYTES NFR BLD: 39.6 % (ref 18–48)
MCH RBC QN AUTO: 30.5 PG (ref 27–31)
MCHC RBC AUTO-ENTMCNC: 32.3 G/DL (ref 32–36)
MCV RBC AUTO: 95 FL (ref 82–98)
MONOCYTES # BLD AUTO: 0.6 K/UL (ref 0.3–1)
MONOCYTES NFR BLD: 10.4 % (ref 4–15)
NEUTROPHILS # BLD AUTO: 2.8 K/UL (ref 1.8–7.7)
NEUTROPHILS NFR BLD: 48.3 % (ref 38–73)
NITRITE UR QL STRIP: NEGATIVE
NRBC BLD-RTO: 0 /100 WBC
PH UR STRIP: 7 [PH] (ref 5–8)
PLATELET # BLD AUTO: 261 K/UL (ref 150–350)
PMV BLD AUTO: 10.5 FL (ref 9.2–12.9)
POTASSIUM SERPL-SCNC: 4.2 MMOL/L (ref 3.5–5.1)
PROT UR QL STRIP: NEGATIVE
RBC # BLD AUTO: 4.19 M/UL (ref 4–5.4)
SATURATED IRON: 29 % (ref 20–50)
SODIUM SERPL-SCNC: 138 MMOL/L (ref 136–145)
SP GR UR STRIP: 1 (ref 1–1.03)
TOTAL IRON BINDING CAPACITY: 462 UG/DL (ref 250–450)
TRANSFERRIN SERPL-MCNC: 312 MG/DL (ref 200–375)
URN SPEC COLLECT METH UR: ABNORMAL
WBC # BLD AUTO: 5.84 K/UL (ref 3.9–12.7)

## 2020-07-31 PROCEDURE — 99999 PR PBB SHADOW E&M-EST. PATIENT-LVL III: ICD-10-PCS | Mod: PBBFAC,,, | Performed by: STUDENT IN AN ORGANIZED HEALTH CARE EDUCATION/TRAINING PROGRAM

## 2020-07-31 PROCEDURE — 99999 PR PBB SHADOW E&M-EST. PATIENT-LVL III: CPT | Mod: PBBFAC,,, | Performed by: STUDENT IN AN ORGANIZED HEALTH CARE EDUCATION/TRAINING PROGRAM

## 2020-07-31 PROCEDURE — 86140 C-REACTIVE PROTEIN: CPT

## 2020-07-31 PROCEDURE — 99213 PR OFFICE/OUTPT VISIT, EST, LEVL III, 20-29 MIN: ICD-10-PCS | Mod: S$GLB,,, | Performed by: STUDENT IN AN ORGANIZED HEALTH CARE EDUCATION/TRAINING PROGRAM

## 2020-07-31 PROCEDURE — 81003 URINALYSIS AUTO W/O SCOPE: CPT

## 2020-07-31 PROCEDURE — 85025 COMPLETE CBC W/AUTO DIFF WBC: CPT

## 2020-07-31 PROCEDURE — 99213 OFFICE O/P EST LOW 20 MIN: CPT | Mod: S$GLB,,, | Performed by: STUDENT IN AN ORGANIZED HEALTH CARE EDUCATION/TRAINING PROGRAM

## 2020-07-31 PROCEDURE — 85652 RBC SED RATE AUTOMATED: CPT

## 2020-07-31 PROCEDURE — 80048 BASIC METABOLIC PNL TOTAL CA: CPT

## 2020-07-31 PROCEDURE — 3008F PR BODY MASS INDEX (BMI) DOCUMENTED: ICD-10-PCS | Mod: CPTII,S$GLB,, | Performed by: STUDENT IN AN ORGANIZED HEALTH CARE EDUCATION/TRAINING PROGRAM

## 2020-07-31 PROCEDURE — 36415 COLL VENOUS BLD VENIPUNCTURE: CPT

## 2020-07-31 PROCEDURE — 83540 ASSAY OF IRON: CPT

## 2020-07-31 PROCEDURE — 82728 ASSAY OF FERRITIN: CPT

## 2020-07-31 PROCEDURE — 3008F BODY MASS INDEX DOCD: CPT | Mod: CPTII,S$GLB,, | Performed by: STUDENT IN AN ORGANIZED HEALTH CARE EDUCATION/TRAINING PROGRAM

## 2020-07-31 NOTE — PROGRESS NOTES
Clinic Note  7/31/2020      Subjective:       Patient ID:  The patient is a 40 y.o. female with being seen in an urgent care setting    Chief Complaint: Constipation, right flank pain    For about the past month she has been constipated and having to strain for a bowel movement. She has associated right flank pain and heartburn with bloating. No urinary frequency, no dysuria, no hematuria. She states she always feels like she needs to have a BM. The patient thought she might have a fibroid issue, but OBGYN did an US and said it wasn't a fibroid. She had a uterine fibroid removed in 2018. She tried Miralax with no relief. Then, she tried magnesium citrate and dulcolax, which helped her become more regular with her BMs but when she stopped she became more constipated again. She has been having 1-2 per day but is having to strain a lot to go. She has only had abdominal pain once or twice. It was a mild sharp pain and came and went in a few seconds 2 days ago. It was not associated with any other symptoms. Pt states she has been having some green stools but no dark or bloody stools. She has been adding Spirulina (algae-based addative) to her smoothies and says this is normal since she has been taking it. The rest of her diet consists of fruits and vegetables, and about once per month she has fish. She drinks almost a gallon of water per day, up from her usual of around 1.5-2L per day to try to help with the constipation. No pain when she has a BM.     Pt was taking iron for a little while for her low iron levels, but she stopped to go with a more natural source. This is why she chose to use Spirulina, as it is supposed to be a source of iron. No hx of kidney stones. Pt does not drink any sodas.      Review of Systems   Constitutional: Negative for diaphoresis and fever.   HENT: Negative for congestion and sore throat.    Eyes: Negative for blurred vision and discharge.   Respiratory: Negative for cough and shortness  "of breath.    Cardiovascular: Negative for chest pain and palpitations.   Gastrointestinal: Positive for constipation and heartburn (in the mornings with belching). Negative for abdominal pain, blood in stool, diarrhea, melena, nausea and vomiting.   Genitourinary: Positive for flank pain (on the right side without radiation). Negative for dysuria, frequency, hematuria and urgency.   Musculoskeletal: Negative for joint pain and myalgias.   Skin: Negative for itching and rash.   Neurological: Negative for weakness and headaches.       Medication List with Changes/Refills   Current Medications    IBUPROFEN (ADVIL,MOTRIN) 600 MG TABLET    Take 1 tablet (600 mg total) by mouth every 8 (eight) hours as needed for Pain.       Patient Active Problem List   Diagnosis    S/P myomectomy           Objective:      /86 (BP Location: Left arm, Patient Position: Sitting, BP Method: Medium (Manual))   Pulse (!) 55   Temp 98.2 °F (36.8 °C)   Ht 5' 7" (1.702 m)   Wt 63.7 kg (140 lb 6.9 oz)   BMI 21.99 kg/m²   Estimated body mass index is 21.99 kg/m² as calculated from the following:    Height as of this encounter: 5' 7" (1.702 m).    Weight as of this encounter: 63.7 kg (140 lb 6.9 oz).  Physical Exam   Constitutional: She is oriented to person, place, and time and well-developed, well-nourished, and in no distress.   HENT:   Head: Normocephalic and atraumatic.   Eyes: Pupils are equal, round, and reactive to light.   Neck: Normal range of motion. Neck supple.   Cardiovascular: Normal rate, regular rhythm, normal heart sounds and intact distal pulses.   Pulmonary/Chest: Effort normal and breath sounds normal. No respiratory distress. She has no rales.   Abdominal: Soft. Bowel sounds are normal. There is no abdominal tenderness. There is no guarding.   Neurological: She is alert and oriented to person, place, and time.   Skin: Skin is warm and dry.   Vitals reviewed.        Assessment and Plan:         Problem List Items " Addressed This Visit     None      Visit Diagnoses     Flank pain    -  Primary    Relevant Orders    CBC auto differential    Sedimentation rate    C-reactive protein    Urinalysis, Reflex to Urine Culture Urine, Clean Catch    Iron deficiency anemia secondary to inadequate dietary iron intake        Relevant Orders    Basic metabolic panel    Iron and TIBC    Ferritin        Plan:  - Follow up lab results early next week and call patient. If testing indicates inflammatory bowel disease, GI consult  - If benign results, I have spoken with the patient about conservative management    Follow Up:   Will call patient with results early next week and speak about follow-up        Connor M Gillies, DO  Internal Medicine, PGY-1

## 2020-08-17 NOTE — PROGRESS NOTES
I have reviewed the notes, assessments, and/or procedures performed this visit, and I concur with the documentation.  Pt. Presents with complaints of flank pain and functional GI complaints which may be diet related.  Instructed patient to keep a diary of GI symptoms to assess for possible Irritable Bowel syndrome or IBD.  Will obtain preliminary labs and consider GI follow up.

## 2021-04-16 ENCOUNTER — PATIENT MESSAGE (OUTPATIENT)
Dept: RESEARCH | Facility: HOSPITAL | Age: 41
End: 2021-04-16

## 2021-06-10 ENCOUNTER — TELEPHONE (OUTPATIENT)
Dept: INTERNAL MEDICINE | Facility: CLINIC | Age: 41
End: 2021-06-10

## 2021-06-14 ENCOUNTER — LAB VISIT (OUTPATIENT)
Dept: LAB | Facility: HOSPITAL | Age: 41
End: 2021-06-14
Attending: FAMILY MEDICINE
Payer: COMMERCIAL

## 2021-06-14 ENCOUNTER — OFFICE VISIT (OUTPATIENT)
Dept: INTERNAL MEDICINE | Facility: CLINIC | Age: 41
End: 2021-06-14
Payer: COMMERCIAL

## 2021-06-14 VITALS
DIASTOLIC BLOOD PRESSURE: 70 MMHG | BODY MASS INDEX: 21.04 KG/M2 | HEART RATE: 64 BPM | HEIGHT: 67 IN | OXYGEN SATURATION: 99 % | RESPIRATION RATE: 16 BRPM | WEIGHT: 134.06 LBS | SYSTOLIC BLOOD PRESSURE: 115 MMHG

## 2021-06-14 DIAGNOSIS — M54.6 RIGHT-SIDED THORACIC BACK PAIN, UNSPECIFIED CHRONICITY: ICD-10-CM

## 2021-06-14 DIAGNOSIS — K62.5 RECTAL BLEEDING: ICD-10-CM

## 2021-06-14 DIAGNOSIS — K62.5 RECTAL BLEEDING: Primary | ICD-10-CM

## 2021-06-14 LAB
BASOPHILS # BLD AUTO: 0.04 K/UL (ref 0–0.2)
BASOPHILS NFR BLD: 0.7 % (ref 0–1.9)
DIFFERENTIAL METHOD: NORMAL
EOSINOPHIL # BLD AUTO: 0 K/UL (ref 0–0.5)
EOSINOPHIL NFR BLD: 0.2 % (ref 0–8)
ERYTHROCYTE [DISTWIDTH] IN BLOOD BY AUTOMATED COUNT: 13.6 % (ref 11.5–14.5)
HCT VFR BLD AUTO: 40 % (ref 37–48.5)
HGB BLD-MCNC: 13.1 G/DL (ref 12–16)
IMM GRANULOCYTES # BLD AUTO: 0.01 K/UL (ref 0–0.04)
IMM GRANULOCYTES NFR BLD AUTO: 0.2 % (ref 0–0.5)
LYMPHOCYTES # BLD AUTO: 2.3 K/UL (ref 1–4.8)
LYMPHOCYTES NFR BLD: 43.1 % (ref 18–48)
MCH RBC QN AUTO: 30.1 PG (ref 27–31)
MCHC RBC AUTO-ENTMCNC: 32.8 G/DL (ref 32–36)
MCV RBC AUTO: 92 FL (ref 82–98)
MONOCYTES # BLD AUTO: 0.6 K/UL (ref 0.3–1)
MONOCYTES NFR BLD: 10.3 % (ref 4–15)
NEUTROPHILS # BLD AUTO: 2.5 K/UL (ref 1.8–7.7)
NEUTROPHILS NFR BLD: 45.5 % (ref 38–73)
NRBC BLD-RTO: 0 /100 WBC
PLATELET # BLD AUTO: 250 K/UL (ref 150–450)
PMV BLD AUTO: 11.4 FL (ref 9.2–12.9)
RBC # BLD AUTO: 4.35 M/UL (ref 4–5.4)
WBC # BLD AUTO: 5.43 K/UL (ref 3.9–12.7)

## 2021-06-14 PROCEDURE — 99213 OFFICE O/P EST LOW 20 MIN: CPT | Mod: S$GLB,,, | Performed by: PHYSICIAN ASSISTANT

## 2021-06-14 PROCEDURE — 99213 PR OFFICE/OUTPT VISIT, EST, LEVL III, 20-29 MIN: ICD-10-PCS | Mod: S$GLB,,, | Performed by: PHYSICIAN ASSISTANT

## 2021-06-14 PROCEDURE — 3008F PR BODY MASS INDEX (BMI) DOCUMENTED: ICD-10-PCS | Mod: CPTII,S$GLB,, | Performed by: PHYSICIAN ASSISTANT

## 2021-06-14 PROCEDURE — 82270 OCCULT BLOOD FECES: CPT | Mod: S$GLB,,, | Performed by: PHYSICIAN ASSISTANT

## 2021-06-14 PROCEDURE — 36415 COLL VENOUS BLD VENIPUNCTURE: CPT | Performed by: PHYSICIAN ASSISTANT

## 2021-06-14 PROCEDURE — 99999 PR PBB SHADOW E&M-EST. PATIENT-LVL IV: ICD-10-PCS | Mod: PBBFAC,,, | Performed by: PHYSICIAN ASSISTANT

## 2021-06-14 PROCEDURE — 1126F AMNT PAIN NOTED NONE PRSNT: CPT | Mod: S$GLB,,, | Performed by: PHYSICIAN ASSISTANT

## 2021-06-14 PROCEDURE — 82270 POCT OCCULT BLOOD STOOL: ICD-10-PCS | Mod: S$GLB,,, | Performed by: PHYSICIAN ASSISTANT

## 2021-06-14 PROCEDURE — 99999 PR PBB SHADOW E&M-EST. PATIENT-LVL IV: CPT | Mod: PBBFAC,,, | Performed by: PHYSICIAN ASSISTANT

## 2021-06-14 PROCEDURE — 3008F BODY MASS INDEX DOCD: CPT | Mod: CPTII,S$GLB,, | Performed by: PHYSICIAN ASSISTANT

## 2021-06-14 PROCEDURE — 85025 COMPLETE CBC W/AUTO DIFF WBC: CPT | Performed by: PHYSICIAN ASSISTANT

## 2021-06-14 PROCEDURE — 1126F PR PAIN SEVERITY QUANTIFIED, NO PAIN PRESENT: ICD-10-PCS | Mod: S$GLB,,, | Performed by: PHYSICIAN ASSISTANT

## 2021-06-15 LAB
CTP QC/QA: YES
FECAL OCCULT BLOOD, POC: POSITIVE

## 2021-06-18 ENCOUNTER — OFFICE VISIT (OUTPATIENT)
Dept: SURGERY | Facility: CLINIC | Age: 41
End: 2021-06-18
Attending: COLON & RECTAL SURGERY
Payer: COMMERCIAL

## 2021-06-18 ENCOUNTER — TELEPHONE (OUTPATIENT)
Dept: SURGERY | Facility: CLINIC | Age: 41
End: 2021-06-18

## 2021-06-18 ENCOUNTER — PATIENT MESSAGE (OUTPATIENT)
Dept: ENDOSCOPY | Facility: HOSPITAL | Age: 41
End: 2021-06-18

## 2021-06-18 VITALS
SYSTOLIC BLOOD PRESSURE: 128 MMHG | DIASTOLIC BLOOD PRESSURE: 66 MMHG | WEIGHT: 134.13 LBS | HEIGHT: 67 IN | BODY MASS INDEX: 21.05 KG/M2 | HEART RATE: 61 BPM

## 2021-06-18 DIAGNOSIS — K62.5 RECTAL BLEEDING: ICD-10-CM

## 2021-06-18 DIAGNOSIS — Z01.818 PRE-OP TESTING: ICD-10-CM

## 2021-06-18 DIAGNOSIS — Z12.11 COLON CANCER SCREENING: Primary | ICD-10-CM

## 2021-06-18 PROCEDURE — 99203 PR OFFICE/OUTPT VISIT, NEW, LEVL III, 30-44 MIN: ICD-10-PCS | Mod: S$GLB,,, | Performed by: COLON & RECTAL SURGERY

## 2021-06-18 PROCEDURE — 3008F PR BODY MASS INDEX (BMI) DOCUMENTED: ICD-10-PCS | Mod: CPTII,S$GLB,, | Performed by: COLON & RECTAL SURGERY

## 2021-06-18 PROCEDURE — 99203 OFFICE O/P NEW LOW 30 MIN: CPT | Mod: S$GLB,,, | Performed by: COLON & RECTAL SURGERY

## 2021-06-18 PROCEDURE — 3008F BODY MASS INDEX DOCD: CPT | Mod: CPTII,S$GLB,, | Performed by: COLON & RECTAL SURGERY

## 2021-06-18 PROCEDURE — 1126F AMNT PAIN NOTED NONE PRSNT: CPT | Mod: S$GLB,,, | Performed by: COLON & RECTAL SURGERY

## 2021-06-18 PROCEDURE — 99999 PR PBB SHADOW E&M-EST. PATIENT-LVL III: CPT | Mod: PBBFAC,,, | Performed by: COLON & RECTAL SURGERY

## 2021-06-18 PROCEDURE — 1126F PR PAIN SEVERITY QUANTIFIED, NO PAIN PRESENT: ICD-10-PCS | Mod: S$GLB,,, | Performed by: COLON & RECTAL SURGERY

## 2021-06-18 PROCEDURE — 99999 PR PBB SHADOW E&M-EST. PATIENT-LVL III: ICD-10-PCS | Mod: PBBFAC,,, | Performed by: COLON & RECTAL SURGERY

## 2021-06-18 RX ORDER — SODIUM, POTASSIUM,MAG SULFATES 17.5-3.13G
1 SOLUTION, RECONSTITUTED, ORAL ORAL DAILY
Qty: 1 KIT | Refills: 0 | Status: SHIPPED | OUTPATIENT
Start: 2021-06-18 | End: 2021-06-20

## 2021-07-02 ENCOUNTER — IMMUNIZATION (OUTPATIENT)
Dept: PRIMARY CARE CLINIC | Facility: CLINIC | Age: 41
End: 2021-07-02
Payer: COMMERCIAL

## 2021-07-02 DIAGNOSIS — Z23 NEED FOR VACCINATION: Primary | ICD-10-CM

## 2021-07-02 PROCEDURE — 91300 COVID-19, MRNA, LNP-S, PF, 30 MCG/0.3 ML DOSE VACCINE: CPT | Mod: S$GLB,,, | Performed by: FAMILY MEDICINE

## 2021-07-02 PROCEDURE — 0001A COVID-19, MRNA, LNP-S, PF, 30 MCG/0.3 ML DOSE VACCINE: ICD-10-PCS | Mod: CV19,S$GLB,, | Performed by: FAMILY MEDICINE

## 2021-07-02 PROCEDURE — 0001A COVID-19, MRNA, LNP-S, PF, 30 MCG/0.3 ML DOSE VACCINE: CPT | Mod: CV19,S$GLB,, | Performed by: FAMILY MEDICINE

## 2021-07-02 PROCEDURE — 91300 COVID-19, MRNA, LNP-S, PF, 30 MCG/0.3 ML DOSE VACCINE: ICD-10-PCS | Mod: S$GLB,,, | Performed by: FAMILY MEDICINE

## 2021-07-05 ENCOUNTER — PATIENT MESSAGE (OUTPATIENT)
Dept: INTERNAL MEDICINE | Facility: CLINIC | Age: 41
End: 2021-07-05

## 2021-07-05 ENCOUNTER — NURSE TRIAGE (OUTPATIENT)
Dept: ADMINISTRATIVE | Facility: CLINIC | Age: 41
End: 2021-07-05

## 2021-07-14 ENCOUNTER — PATIENT MESSAGE (OUTPATIENT)
Dept: ENDOSCOPY | Facility: HOSPITAL | Age: 41
End: 2021-07-14

## 2021-07-23 ENCOUNTER — IMMUNIZATION (OUTPATIENT)
Dept: PRIMARY CARE CLINIC | Facility: CLINIC | Age: 41
End: 2021-07-23
Payer: COMMERCIAL

## 2021-07-23 DIAGNOSIS — Z23 NEED FOR VACCINATION: Primary | ICD-10-CM

## 2021-07-23 PROCEDURE — 91300 COVID-19, MRNA, LNP-S, PF, 30 MCG/0.3 ML DOSE VACCINE: CPT | Mod: S$GLB,,, | Performed by: FAMILY MEDICINE

## 2021-07-23 PROCEDURE — 91300 COVID-19, MRNA, LNP-S, PF, 30 MCG/0.3 ML DOSE VACCINE: ICD-10-PCS | Mod: S$GLB,,, | Performed by: FAMILY MEDICINE

## 2021-07-23 PROCEDURE — 0002A COVID-19, MRNA, LNP-S, PF, 30 MCG/0.3 ML DOSE VACCINE: CPT | Mod: CV19,S$GLB,, | Performed by: FAMILY MEDICINE

## 2021-07-23 PROCEDURE — 0002A COVID-19, MRNA, LNP-S, PF, 30 MCG/0.3 ML DOSE VACCINE: ICD-10-PCS | Mod: CV19,S$GLB,, | Performed by: FAMILY MEDICINE

## 2021-09-10 DIAGNOSIS — Z01.818 PRE-OP TESTING: Primary | ICD-10-CM

## 2021-09-28 ENCOUNTER — PATIENT MESSAGE (OUTPATIENT)
Dept: ENDOSCOPY | Facility: HOSPITAL | Age: 41
End: 2021-09-28

## 2021-10-21 ENCOUNTER — OFFICE VISIT (OUTPATIENT)
Dept: INTERNAL MEDICINE | Facility: CLINIC | Age: 41
End: 2021-10-21
Payer: COMMERCIAL

## 2021-10-21 VITALS
HEART RATE: 60 BPM | OXYGEN SATURATION: 99 % | BODY MASS INDEX: 21.18 KG/M2 | SYSTOLIC BLOOD PRESSURE: 106 MMHG | TEMPERATURE: 97 F | HEIGHT: 67 IN | DIASTOLIC BLOOD PRESSURE: 66 MMHG | WEIGHT: 134.94 LBS | RESPIRATION RATE: 16 BRPM

## 2021-10-21 DIAGNOSIS — K52.9 GASTROENTERITIS: Primary | ICD-10-CM

## 2021-10-21 DIAGNOSIS — R07.9 CHEST PAIN, UNSPECIFIED TYPE: ICD-10-CM

## 2021-10-21 PROCEDURE — 3078F DIAST BP <80 MM HG: CPT | Mod: CPTII,S$GLB,, | Performed by: HOSPITALIST

## 2021-10-21 PROCEDURE — 93005 ELECTROCARDIOGRAM TRACING: CPT | Mod: S$GLB,,, | Performed by: HOSPITALIST

## 2021-10-21 PROCEDURE — 93010 EKG 12-LEAD: ICD-10-PCS | Mod: S$GLB,,, | Performed by: INTERNAL MEDICINE

## 2021-10-21 PROCEDURE — 1160F PR REVIEW ALL MEDS BY PRESCRIBER/CLIN PHARMACIST DOCUMENTED: ICD-10-PCS | Mod: CPTII,S$GLB,, | Performed by: HOSPITALIST

## 2021-10-21 PROCEDURE — 3074F SYST BP LT 130 MM HG: CPT | Mod: CPTII,S$GLB,, | Performed by: HOSPITALIST

## 2021-10-21 PROCEDURE — 1160F RVW MEDS BY RX/DR IN RCRD: CPT | Mod: CPTII,S$GLB,, | Performed by: HOSPITALIST

## 2021-10-21 PROCEDURE — 1159F PR MEDICATION LIST DOCUMENTED IN MEDICAL RECORD: ICD-10-PCS | Mod: CPTII,S$GLB,, | Performed by: HOSPITALIST

## 2021-10-21 PROCEDURE — 99214 OFFICE O/P EST MOD 30 MIN: CPT | Mod: S$GLB,,, | Performed by: HOSPITALIST

## 2021-10-21 PROCEDURE — 99214 PR OFFICE/OUTPT VISIT, EST, LEVL IV, 30-39 MIN: ICD-10-PCS | Mod: S$GLB,,, | Performed by: HOSPITALIST

## 2021-10-21 PROCEDURE — 3074F PR MOST RECENT SYSTOLIC BLOOD PRESSURE < 130 MM HG: ICD-10-PCS | Mod: CPTII,S$GLB,, | Performed by: HOSPITALIST

## 2021-10-21 PROCEDURE — 99999 PR PBB SHADOW E&M-EST. PATIENT-LVL III: ICD-10-PCS | Mod: PBBFAC,,, | Performed by: HOSPITALIST

## 2021-10-21 PROCEDURE — 3078F PR MOST RECENT DIASTOLIC BLOOD PRESSURE < 80 MM HG: ICD-10-PCS | Mod: CPTII,S$GLB,, | Performed by: HOSPITALIST

## 2021-10-21 PROCEDURE — 3008F PR BODY MASS INDEX (BMI) DOCUMENTED: ICD-10-PCS | Mod: CPTII,S$GLB,, | Performed by: HOSPITALIST

## 2021-10-21 PROCEDURE — 3008F BODY MASS INDEX DOCD: CPT | Mod: CPTII,S$GLB,, | Performed by: HOSPITALIST

## 2021-10-21 PROCEDURE — 1159F MED LIST DOCD IN RCRD: CPT | Mod: CPTII,S$GLB,, | Performed by: HOSPITALIST

## 2021-10-21 PROCEDURE — 99999 PR PBB SHADOW E&M-EST. PATIENT-LVL III: CPT | Mod: PBBFAC,,, | Performed by: HOSPITALIST

## 2021-10-21 PROCEDURE — 93005 EKG 12-LEAD: ICD-10-PCS | Mod: S$GLB,,, | Performed by: HOSPITALIST

## 2021-10-21 PROCEDURE — 93010 ELECTROCARDIOGRAM REPORT: CPT | Mod: S$GLB,,, | Performed by: INTERNAL MEDICINE

## 2021-10-21 RX ORDER — ONDANSETRON 4 MG/1
4 TABLET, FILM COATED ORAL EVERY 8 HOURS PRN
Qty: 20 TABLET | Refills: 0 | Status: ON HOLD | OUTPATIENT
Start: 2021-10-21 | End: 2021-11-22 | Stop reason: HOSPADM

## 2021-10-21 RX ORDER — LOPERAMIDE HYDROCHLORIDE 2 MG/1
2 CAPSULE ORAL 4 TIMES DAILY PRN
Qty: 20 CAPSULE | Refills: 0 | Status: ON HOLD | OUTPATIENT
Start: 2021-10-21 | End: 2021-11-22 | Stop reason: HOSPADM

## 2021-10-25 ENCOUNTER — HOSPITAL ENCOUNTER (OUTPATIENT)
Facility: HOSPITAL | Age: 41
Discharge: HOME | End: 2021-10-25
Attending: COLON & RECTAL SURGERY | Admitting: COLON & RECTAL SURGERY
Payer: COMMERCIAL

## 2021-10-25 ENCOUNTER — ANESTHESIA (OUTPATIENT)
Dept: ENDOSCOPY | Facility: HOSPITAL | Age: 41
End: 2021-10-25
Payer: COMMERCIAL

## 2021-10-25 ENCOUNTER — ANESTHESIA EVENT (OUTPATIENT)
Dept: ENDOSCOPY | Facility: HOSPITAL | Age: 41
End: 2021-10-25
Payer: COMMERCIAL

## 2021-10-25 VITALS
SYSTOLIC BLOOD PRESSURE: 148 MMHG | DIASTOLIC BLOOD PRESSURE: 76 MMHG | OXYGEN SATURATION: 100 % | TEMPERATURE: 98 F | HEART RATE: 60 BPM | WEIGHT: 130.06 LBS | HEIGHT: 67 IN | BODY MASS INDEX: 20.41 KG/M2 | RESPIRATION RATE: 20 BRPM

## 2021-10-25 DIAGNOSIS — Z12.11 SCREENING FOR MALIGNANT NEOPLASM OF COLON: Primary | ICD-10-CM

## 2021-10-25 LAB
B-HCG UR QL: NEGATIVE
CTP QC/QA: YES

## 2021-10-25 PROCEDURE — 37000008 HC ANESTHESIA 1ST 15 MINUTES: Performed by: COLON & RECTAL SURGERY

## 2021-10-25 PROCEDURE — 88305 TISSUE EXAM BY PATHOLOGIST: CPT | Mod: 26,,, | Performed by: PATHOLOGY

## 2021-10-25 PROCEDURE — 45385 COLONOSCOPY W/LESION REMOVAL: CPT | Performed by: COLON & RECTAL SURGERY

## 2021-10-25 PROCEDURE — 25000003 PHARM REV CODE 250: Performed by: REGISTERED NURSE

## 2021-10-25 PROCEDURE — E9220 PRA ENDO ANESTHESIA: ICD-10-PCS | Mod: ,,, | Performed by: REGISTERED NURSE

## 2021-10-25 PROCEDURE — 88305 TISSUE EXAM BY PATHOLOGIST: CPT | Mod: 59 | Performed by: PATHOLOGY

## 2021-10-25 PROCEDURE — 88342 IMHCHEM/IMCYTCHM 1ST ANTB: CPT | Performed by: PATHOLOGY

## 2021-10-25 PROCEDURE — 88341 IMHCHEM/IMCYTCHM EA ADD ANTB: CPT | Mod: 26,,, | Performed by: PATHOLOGY

## 2021-10-25 PROCEDURE — 88341 IMHCHEM/IMCYTCHM EA ADD ANTB: CPT | Performed by: PATHOLOGY

## 2021-10-25 PROCEDURE — 88341 PR IHC OR ICC EACH ADD'L SINGLE ANTIBODY  STAINPR: ICD-10-PCS | Mod: 26,,, | Performed by: PATHOLOGY

## 2021-10-25 PROCEDURE — 45385 COLONOSCOPY W/LESION REMOVAL: CPT | Mod: ,,, | Performed by: COLON & RECTAL SURGERY

## 2021-10-25 PROCEDURE — 37000009 HC ANESTHESIA EA ADD 15 MINS: Performed by: COLON & RECTAL SURGERY

## 2021-10-25 PROCEDURE — 63600175 PHARM REV CODE 636 W HCPCS: Performed by: REGISTERED NURSE

## 2021-10-25 PROCEDURE — 27201089 HC SNARE, DISP (ANY): Performed by: COLON & RECTAL SURGERY

## 2021-10-25 PROCEDURE — 88342 CHG IMMUNOCYTOCHEMISTRY: ICD-10-PCS | Mod: 26,,, | Performed by: PATHOLOGY

## 2021-10-25 PROCEDURE — E9220 PRA ENDO ANESTHESIA: HCPCS | Mod: ,,, | Performed by: REGISTERED NURSE

## 2021-10-25 PROCEDURE — 25000003 PHARM REV CODE 250: Performed by: COLON & RECTAL SURGERY

## 2021-10-25 PROCEDURE — 81025 URINE PREGNANCY TEST: CPT | Performed by: COLON & RECTAL SURGERY

## 2021-10-25 PROCEDURE — 88342 IMHCHEM/IMCYTCHM 1ST ANTB: CPT | Mod: 26,,, | Performed by: PATHOLOGY

## 2021-10-25 PROCEDURE — 88305 TISSUE EXAM BY PATHOLOGIST: ICD-10-PCS | Mod: 26,,, | Performed by: PATHOLOGY

## 2021-10-25 PROCEDURE — 45385 PR COLONOSCOPY,REMV LESN,SNARE: ICD-10-PCS | Mod: ,,, | Performed by: COLON & RECTAL SURGERY

## 2021-10-25 RX ORDER — PROPOFOL 10 MG/ML
VIAL (ML) INTRAVENOUS CONTINUOUS PRN
Status: DISCONTINUED | OUTPATIENT
Start: 2021-10-25 | End: 2021-10-25

## 2021-10-25 RX ORDER — LIDOCAINE HYDROCHLORIDE 20 MG/ML
INJECTION INTRAVENOUS
Status: DISCONTINUED | OUTPATIENT
Start: 2021-10-25 | End: 2021-10-25

## 2021-10-25 RX ORDER — PROPOFOL 10 MG/ML
VIAL (ML) INTRAVENOUS
Status: DISCONTINUED | OUTPATIENT
Start: 2021-10-25 | End: 2021-10-25

## 2021-10-25 RX ORDER — SODIUM CHLORIDE 9 MG/ML
INJECTION, SOLUTION INTRAVENOUS CONTINUOUS
Status: DISCONTINUED | OUTPATIENT
Start: 2021-10-25 | End: 2021-10-25 | Stop reason: HOSPADM

## 2021-10-25 RX ADMIN — PROPOFOL 100 MG: 10 INJECTION, EMULSION INTRAVENOUS at 10:10

## 2021-10-25 RX ADMIN — PROPOFOL 20 MG: 10 INJECTION, EMULSION INTRAVENOUS at 10:10

## 2021-10-25 RX ADMIN — SODIUM CHLORIDE: 0.9 INJECTION, SOLUTION INTRAVENOUS at 08:10

## 2021-10-25 RX ADMIN — SODIUM CHLORIDE: 9 INJECTION, SOLUTION INTRAVENOUS at 09:10

## 2021-10-25 RX ADMIN — Medication 150 MCG/KG/MIN: at 10:10

## 2021-10-25 RX ADMIN — LIDOCAINE HYDROCHLORIDE 100 MG: 20 INJECTION, SOLUTION INTRAVENOUS at 10:10

## 2021-11-05 ENCOUNTER — TELEPHONE (OUTPATIENT)
Dept: SURGERY | Facility: CLINIC | Age: 41
End: 2021-11-05
Payer: COMMERCIAL

## 2021-11-05 ENCOUNTER — PATIENT MESSAGE (OUTPATIENT)
Dept: SURGERY | Facility: CLINIC | Age: 41
End: 2021-11-05
Payer: COMMERCIAL

## 2021-11-08 ENCOUNTER — TELEPHONE (OUTPATIENT)
Dept: SURGERY | Facility: CLINIC | Age: 41
End: 2021-11-08
Payer: COMMERCIAL

## 2021-11-11 ENCOUNTER — TELEPHONE (OUTPATIENT)
Dept: SURGERY | Facility: CLINIC | Age: 41
End: 2021-11-11
Payer: COMMERCIAL

## 2021-11-11 LAB
FINAL PATHOLOGIC DIAGNOSIS: NORMAL
GROSS: NORMAL
Lab: NORMAL
MICROSCOPIC EXAM: NORMAL
SUPPLEMENTAL DIAGNOSIS: NORMAL

## 2021-11-12 ENCOUNTER — OFFICE VISIT (OUTPATIENT)
Dept: SURGERY | Facility: CLINIC | Age: 41
End: 2021-11-12
Attending: COLON & RECTAL SURGERY
Payer: COMMERCIAL

## 2021-11-12 VITALS
RESPIRATION RATE: 20 BRPM | HEART RATE: 69 BPM | BODY MASS INDEX: 21.07 KG/M2 | DIASTOLIC BLOOD PRESSURE: 68 MMHG | WEIGHT: 134.5 LBS | SYSTOLIC BLOOD PRESSURE: 148 MMHG | OXYGEN SATURATION: 99 %

## 2021-11-12 DIAGNOSIS — D3A.8 BENIGN NEUROENDOCRINE NEOPLASM OF RECTUM: Primary | ICD-10-CM

## 2021-11-12 PROCEDURE — 99214 OFFICE O/P EST MOD 30 MIN: CPT | Mod: S$GLB,,, | Performed by: COLON & RECTAL SURGERY

## 2021-11-12 PROCEDURE — 99999 PR PBB SHADOW E&M-EST. PATIENT-LVL IV: CPT | Mod: PBBFAC,,, | Performed by: COLON & RECTAL SURGERY

## 2021-11-12 PROCEDURE — 1159F PR MEDICATION LIST DOCUMENTED IN MEDICAL RECORD: ICD-10-PCS | Mod: CPTII,S$GLB,, | Performed by: COLON & RECTAL SURGERY

## 2021-11-12 PROCEDURE — 3077F PR MOST RECENT SYSTOLIC BLOOD PRESSURE >= 140 MM HG: ICD-10-PCS | Mod: CPTII,S$GLB,, | Performed by: COLON & RECTAL SURGERY

## 2021-11-12 PROCEDURE — 99999 PR PBB SHADOW E&M-EST. PATIENT-LVL IV: ICD-10-PCS | Mod: PBBFAC,,, | Performed by: COLON & RECTAL SURGERY

## 2021-11-12 PROCEDURE — 1160F RVW MEDS BY RX/DR IN RCRD: CPT | Mod: CPTII,S$GLB,, | Performed by: COLON & RECTAL SURGERY

## 2021-11-12 PROCEDURE — 3077F SYST BP >= 140 MM HG: CPT | Mod: CPTII,S$GLB,, | Performed by: COLON & RECTAL SURGERY

## 2021-11-12 PROCEDURE — 1159F MED LIST DOCD IN RCRD: CPT | Mod: CPTII,S$GLB,, | Performed by: COLON & RECTAL SURGERY

## 2021-11-12 PROCEDURE — 3008F BODY MASS INDEX DOCD: CPT | Mod: CPTII,S$GLB,, | Performed by: COLON & RECTAL SURGERY

## 2021-11-12 PROCEDURE — 3008F PR BODY MASS INDEX (BMI) DOCUMENTED: ICD-10-PCS | Mod: CPTII,S$GLB,, | Performed by: COLON & RECTAL SURGERY

## 2021-11-12 PROCEDURE — 99214 PR OFFICE/OUTPT VISIT, EST, LEVL IV, 30-39 MIN: ICD-10-PCS | Mod: S$GLB,,, | Performed by: COLON & RECTAL SURGERY

## 2021-11-12 PROCEDURE — 3078F PR MOST RECENT DIASTOLIC BLOOD PRESSURE < 80 MM HG: ICD-10-PCS | Mod: CPTII,S$GLB,, | Performed by: COLON & RECTAL SURGERY

## 2021-11-12 PROCEDURE — 3078F DIAST BP <80 MM HG: CPT | Mod: CPTII,S$GLB,, | Performed by: COLON & RECTAL SURGERY

## 2021-11-12 PROCEDURE — 1160F PR REVIEW ALL MEDS BY PRESCRIBER/CLIN PHARMACIST DOCUMENTED: ICD-10-PCS | Mod: CPTII,S$GLB,, | Performed by: COLON & RECTAL SURGERY

## 2021-11-17 ENCOUNTER — PATIENT MESSAGE (OUTPATIENT)
Dept: SURGERY | Facility: CLINIC | Age: 41
End: 2021-11-17
Payer: COMMERCIAL

## 2021-11-19 ENCOUNTER — PATIENT MESSAGE (OUTPATIENT)
Dept: SURGERY | Facility: CLINIC | Age: 41
End: 2021-11-19
Payer: COMMERCIAL

## 2021-11-19 ENCOUNTER — TELEPHONE (OUTPATIENT)
Dept: SURGERY | Facility: CLINIC | Age: 41
End: 2021-11-19
Payer: COMMERCIAL

## 2021-11-21 ENCOUNTER — ANESTHESIA EVENT (OUTPATIENT)
Dept: SURGERY | Facility: HOSPITAL | Age: 41
End: 2021-11-21
Payer: COMMERCIAL

## 2021-11-22 ENCOUNTER — HOSPITAL ENCOUNTER (OUTPATIENT)
Facility: HOSPITAL | Age: 41
Discharge: HOME | End: 2021-11-22
Attending: COLON & RECTAL SURGERY | Admitting: COLON & RECTAL SURGERY
Payer: COMMERCIAL

## 2021-11-22 ENCOUNTER — ANESTHESIA (OUTPATIENT)
Dept: SURGERY | Facility: HOSPITAL | Age: 41
End: 2021-11-22
Payer: COMMERCIAL

## 2021-11-22 VITALS
HEART RATE: 62 BPM | BODY MASS INDEX: 20.4 KG/M2 | SYSTOLIC BLOOD PRESSURE: 135 MMHG | HEIGHT: 67 IN | DIASTOLIC BLOOD PRESSURE: 76 MMHG | TEMPERATURE: 98 F | OXYGEN SATURATION: 100 % | WEIGHT: 130 LBS | RESPIRATION RATE: 18 BRPM

## 2021-11-22 DIAGNOSIS — Z98.890 S/P MYOMECTOMY: ICD-10-CM

## 2021-11-22 DIAGNOSIS — K62.1 RECTAL POLYP: Primary | ICD-10-CM

## 2021-11-22 PROBLEM — D3A.8: Status: ACTIVE | Noted: 2021-11-22

## 2021-11-22 LAB
B-HCG UR QL: NEGATIVE
CTP QC/QA: YES

## 2021-11-22 PROCEDURE — 25000003 PHARM REV CODE 250: Performed by: COLON & RECTAL SURGERY

## 2021-11-22 PROCEDURE — 71000015 HC POSTOP RECOV 1ST HR: Performed by: COLON & RECTAL SURGERY

## 2021-11-22 PROCEDURE — 25000003 PHARM REV CODE 250: Performed by: STUDENT IN AN ORGANIZED HEALTH CARE EDUCATION/TRAINING PROGRAM

## 2021-11-22 PROCEDURE — C9113 INJ PANTOPRAZOLE SODIUM, VIA: HCPCS | Performed by: STUDENT IN AN ORGANIZED HEALTH CARE EDUCATION/TRAINING PROGRAM

## 2021-11-22 PROCEDURE — 88305 TISSUE EXAM BY PATHOLOGIST: CPT | Performed by: PATHOLOGY

## 2021-11-22 PROCEDURE — 36000707: Performed by: COLON & RECTAL SURGERY

## 2021-11-22 PROCEDURE — 45172 EXC RECT TUM TRANSANAL FULL: CPT | Mod: ,,, | Performed by: COLON & RECTAL SURGERY

## 2021-11-22 PROCEDURE — 37000008 HC ANESTHESIA 1ST 15 MINUTES: Performed by: COLON & RECTAL SURGERY

## 2021-11-22 PROCEDURE — 63600175 PHARM REV CODE 636 W HCPCS: Performed by: STUDENT IN AN ORGANIZED HEALTH CARE EDUCATION/TRAINING PROGRAM

## 2021-11-22 PROCEDURE — 25000003 PHARM REV CODE 250: Performed by: NURSE PRACTITIONER

## 2021-11-22 PROCEDURE — 71000044 HC DOSC ROUTINE RECOVERY FIRST HOUR: Performed by: COLON & RECTAL SURGERY

## 2021-11-22 PROCEDURE — 88305 TISSUE EXAM BY PATHOLOGIST: ICD-10-PCS | Mod: 26,,, | Performed by: PATHOLOGY

## 2021-11-22 PROCEDURE — D9220A PRA ANESTHESIA: Mod: ,,, | Performed by: ANESTHESIOLOGY

## 2021-11-22 PROCEDURE — 25000003 PHARM REV CODE 250: Performed by: SURGERY

## 2021-11-22 PROCEDURE — 71000016 HC POSTOP RECOV ADDL HR: Performed by: COLON & RECTAL SURGERY

## 2021-11-22 PROCEDURE — D9220A PRA ANESTHESIA: ICD-10-PCS | Mod: ,,, | Performed by: ANESTHESIOLOGY

## 2021-11-22 PROCEDURE — 45172 PR EXCIS RECTAL TUMOR, TRANSANAL, FULL THICKNESS: ICD-10-PCS | Mod: ,,, | Performed by: COLON & RECTAL SURGERY

## 2021-11-22 PROCEDURE — S0030 INJECTION, METRONIDAZOLE: HCPCS | Performed by: STUDENT IN AN ORGANIZED HEALTH CARE EDUCATION/TRAINING PROGRAM

## 2021-11-22 PROCEDURE — 37000009 HC ANESTHESIA EA ADD 15 MINS: Performed by: COLON & RECTAL SURGERY

## 2021-11-22 PROCEDURE — 88305 TISSUE EXAM BY PATHOLOGIST: CPT | Mod: 26,,, | Performed by: PATHOLOGY

## 2021-11-22 PROCEDURE — 27201423 OPTIME MED/SURG SUP & DEVICES STERILE SUPPLY: Performed by: COLON & RECTAL SURGERY

## 2021-11-22 PROCEDURE — 36000706: Performed by: COLON & RECTAL SURGERY

## 2021-11-22 RX ORDER — ACETAMINOPHEN 500 MG
1000 TABLET ORAL ONCE
Status: COMPLETED | OUTPATIENT
Start: 2021-11-22 | End: 2021-11-22

## 2021-11-22 RX ORDER — FENTANYL CITRATE 50 UG/ML
INJECTION, SOLUTION INTRAMUSCULAR; INTRAVENOUS
Status: DISCONTINUED | OUTPATIENT
Start: 2021-11-22 | End: 2021-11-22

## 2021-11-22 RX ORDER — SCOLOPAMINE TRANSDERMAL SYSTEM 1 MG/1
1 PATCH, EXTENDED RELEASE TRANSDERMAL ONCE
Status: DISCONTINUED | OUTPATIENT
Start: 2021-11-22 | End: 2021-11-22 | Stop reason: HOSPADM

## 2021-11-22 RX ORDER — ROCURONIUM BROMIDE 10 MG/ML
INJECTION, SOLUTION INTRAVENOUS
Status: DISCONTINUED | OUTPATIENT
Start: 2021-11-22 | End: 2021-11-22

## 2021-11-22 RX ORDER — PROPOFOL 10 MG/ML
VIAL (ML) INTRAVENOUS
Status: DISCONTINUED | OUTPATIENT
Start: 2021-11-22 | End: 2021-11-22

## 2021-11-22 RX ORDER — KETAMINE HCL IN 0.9 % NACL 50 MG/5 ML
SYRINGE (ML) INTRAVENOUS
Status: DISCONTINUED | OUTPATIENT
Start: 2021-11-22 | End: 2021-11-22

## 2021-11-22 RX ORDER — IBUPROFEN 600 MG/1
600 TABLET ORAL 4 TIMES DAILY
Qty: 28 TABLET | Refills: 0 | Status: SHIPPED | OUTPATIENT
Start: 2021-11-22 | End: 2021-11-29

## 2021-11-22 RX ORDER — PANTOPRAZOLE SODIUM 40 MG/10ML
40 INJECTION, POWDER, LYOPHILIZED, FOR SOLUTION INTRAVENOUS ONCE
Status: COMPLETED | OUTPATIENT
Start: 2021-11-22 | End: 2021-11-22

## 2021-11-22 RX ORDER — LIDOCAINE HYDROCHLORIDE 10 MG/ML
1 INJECTION INFILTRATION; PERINEURAL ONCE
Status: DISCONTINUED | OUTPATIENT
Start: 2021-11-22 | End: 2021-11-22 | Stop reason: HOSPADM

## 2021-11-22 RX ORDER — BUPIVACAINE HYDROCHLORIDE AND EPINEPHRINE 2.5; 5 MG/ML; UG/ML
INJECTION, SOLUTION EPIDURAL; INFILTRATION; INTRACAUDAL; PERINEURAL
Status: DISCONTINUED | OUTPATIENT
Start: 2021-11-22 | End: 2021-11-22 | Stop reason: HOSPADM

## 2021-11-22 RX ORDER — NEOSTIGMINE METHYLSULFATE 0.5 MG/ML
INJECTION, SOLUTION INTRAVENOUS
Status: DISCONTINUED | OUTPATIENT
Start: 2021-11-22 | End: 2021-11-22

## 2021-11-22 RX ORDER — IBUPROFEN 200 MG
600 TABLET ORAL EVERY 6 HOURS PRN
Status: DISCONTINUED | OUTPATIENT
Start: 2021-11-22 | End: 2021-11-22 | Stop reason: HOSPADM

## 2021-11-22 RX ORDER — MUPIROCIN 20 MG/G
OINTMENT TOPICAL
Status: DISCONTINUED | OUTPATIENT
Start: 2021-11-22 | End: 2021-11-22 | Stop reason: HOSPADM

## 2021-11-22 RX ORDER — SODIUM CHLORIDE 9 MG/ML
INJECTION, SOLUTION INTRAVENOUS CONTINUOUS
Status: DISCONTINUED | OUTPATIENT
Start: 2021-11-22 | End: 2021-11-22 | Stop reason: HOSPADM

## 2021-11-22 RX ORDER — METRONIDAZOLE 500 MG/100ML
INJECTION, SOLUTION INTRAVENOUS
Status: DISCONTINUED | OUTPATIENT
Start: 2021-11-22 | End: 2021-11-22

## 2021-11-22 RX ORDER — MIDAZOLAM HYDROCHLORIDE 1 MG/ML
INJECTION, SOLUTION INTRAMUSCULAR; INTRAVENOUS
Status: DISCONTINUED | OUTPATIENT
Start: 2021-11-22 | End: 2021-11-22

## 2021-11-22 RX ORDER — FAMOTIDINE 10 MG/ML
INJECTION INTRAVENOUS
Status: DISCONTINUED | OUTPATIENT
Start: 2021-11-22 | End: 2021-11-22

## 2021-11-22 RX ORDER — FENTANYL CITRATE 50 UG/ML
25 INJECTION, SOLUTION INTRAMUSCULAR; INTRAVENOUS EVERY 5 MIN PRN
Status: DISCONTINUED | OUTPATIENT
Start: 2021-11-22 | End: 2021-11-22 | Stop reason: HOSPADM

## 2021-11-22 RX ORDER — SODIUM CHLORIDE 0.9 % (FLUSH) 0.9 %
10 SYRINGE (ML) INJECTION
Status: DISCONTINUED | OUTPATIENT
Start: 2021-11-22 | End: 2021-11-22 | Stop reason: HOSPADM

## 2021-11-22 RX ORDER — ONDANSETRON 2 MG/ML
INJECTION INTRAMUSCULAR; INTRAVENOUS
Status: DISCONTINUED | OUTPATIENT
Start: 2021-11-22 | End: 2021-11-22

## 2021-11-22 RX ORDER — LIDOCAINE HYDROCHLORIDE 20 MG/ML
INJECTION, SOLUTION EPIDURAL; INFILTRATION; INTRACAUDAL; PERINEURAL
Status: DISCONTINUED | OUTPATIENT
Start: 2021-11-22 | End: 2021-11-22

## 2021-11-22 RX ORDER — ACETAMINOPHEN 500 MG
1000 TABLET ORAL EVERY 6 HOURS
Qty: 80 TABLET | Refills: 0 | Status: SHIPPED | OUTPATIENT
Start: 2021-11-22 | End: 2021-12-02

## 2021-11-22 RX ORDER — LIDOCAINE HYDROCHLORIDE 10 MG/ML
INJECTION, SOLUTION EPIDURAL; INFILTRATION; INTRACAUDAL; PERINEURAL
Status: DISCONTINUED | OUTPATIENT
Start: 2021-11-22 | End: 2021-11-22 | Stop reason: HOSPADM

## 2021-11-22 RX ORDER — DEXMEDETOMIDINE HYDROCHLORIDE 100 UG/ML
INJECTION, SOLUTION INTRAVENOUS
Status: DISCONTINUED | OUTPATIENT
Start: 2021-11-22 | End: 2021-11-22

## 2021-11-22 RX ORDER — ONDANSETRON 2 MG/ML
4 INJECTION INTRAMUSCULAR; INTRAVENOUS DAILY PRN
Status: DISCONTINUED | OUTPATIENT
Start: 2021-11-22 | End: 2021-11-22 | Stop reason: HOSPADM

## 2021-11-22 RX ORDER — DEXAMETHASONE SODIUM PHOSPHATE 4 MG/ML
INJECTION, SOLUTION INTRA-ARTICULAR; INTRALESIONAL; INTRAMUSCULAR; INTRAVENOUS; SOFT TISSUE
Status: DISCONTINUED | OUTPATIENT
Start: 2021-11-22 | End: 2021-11-22

## 2021-11-22 RX ADMIN — METRONIDAZOLE 500 MG: 500 INJECTION, SOLUTION INTRAVENOUS at 07:11

## 2021-11-22 RX ADMIN — DEXAMETHASONE SODIUM PHOSPHATE 4 MG: 4 INJECTION INTRA-ARTICULAR; INTRALESIONAL; INTRAMUSCULAR; INTRAVENOUS; SOFT TISSUE at 08:11

## 2021-11-22 RX ADMIN — PANTOPRAZOLE SODIUM 40 MG: 40 INJECTION, POWDER, FOR SOLUTION INTRAVENOUS at 11:11

## 2021-11-22 RX ADMIN — IBUPROFEN 600 MG: 200 TABLET, FILM COATED ORAL at 09:11

## 2021-11-22 RX ADMIN — DEXMEDETOMIDINE HYDROCHLORIDE 12 MCG: 100 INJECTION, SOLUTION INTRAVENOUS at 07:11

## 2021-11-22 RX ADMIN — ACETAMINOPHEN 1000 MG: 500 TABLET ORAL at 05:11

## 2021-11-22 RX ADMIN — MUPIROCIN: 20 OINTMENT TOPICAL at 05:11

## 2021-11-22 RX ADMIN — CEFTRIAXONE SODIUM 2 G: 1 INJECTION, SOLUTION INTRAVENOUS at 07:11

## 2021-11-22 RX ADMIN — Medication 25 MG: at 07:11

## 2021-11-22 RX ADMIN — LIDOCAINE HYDROCHLORIDE 80 MG: 20 INJECTION, SOLUTION EPIDURAL; INFILTRATION; INTRACAUDAL at 07:11

## 2021-11-22 RX ADMIN — FAMOTIDINE 20 MG: 10 INJECTION INTRAVENOUS at 07:11

## 2021-11-22 RX ADMIN — DEXMEDETOMIDINE HYDROCHLORIDE 8 MCG: 100 INJECTION, SOLUTION INTRAVENOUS at 07:11

## 2021-11-22 RX ADMIN — MIDAZOLAM 2 MG: 1 INJECTION INTRAMUSCULAR; INTRAVENOUS at 06:11

## 2021-11-22 RX ADMIN — GLYCOPYRROLATE 0.6 MG: 0.2 INJECTION, SOLUTION INTRAMUSCULAR; INTRAVITREAL at 08:11

## 2021-11-22 RX ADMIN — ROCURONIUM BROMIDE 10 MG: 10 INJECTION, SOLUTION INTRAVENOUS at 07:11

## 2021-11-22 RX ADMIN — PROPOFOL 200 MG: 10 INJECTION, EMULSION INTRAVENOUS at 07:11

## 2021-11-22 RX ADMIN — FENTANYL CITRATE 100 MCG: 50 INJECTION INTRAMUSCULAR; INTRAVENOUS at 07:11

## 2021-11-22 RX ADMIN — Medication 10 MG: at 07:11

## 2021-11-22 RX ADMIN — SCOPALAMINE 1 PATCH: 1 PATCH, EXTENDED RELEASE TRANSDERMAL at 05:11

## 2021-11-22 RX ADMIN — NEOSTIGMINE METHYLSULFATE 4 MG: 0.5 INJECTION INTRAVENOUS at 08:11

## 2021-11-22 RX ADMIN — ONDANSETRON 8 MG: 2 INJECTION INTRAMUSCULAR; INTRAVENOUS at 07:11

## 2021-11-22 RX ADMIN — ROCURONIUM BROMIDE 40 MG: 10 INJECTION, SOLUTION INTRAVENOUS at 07:11

## 2021-12-02 LAB
FINAL PATHOLOGIC DIAGNOSIS: NORMAL
Lab: NORMAL

## 2022-01-13 ENCOUNTER — IMMUNIZATION (OUTPATIENT)
Dept: PRIMARY CARE CLINIC | Facility: CLINIC | Age: 42
End: 2022-01-13
Payer: COMMERCIAL

## 2022-01-13 DIAGNOSIS — Z23 NEED FOR VACCINATION: Primary | ICD-10-CM

## 2022-01-13 PROCEDURE — 0004A COVID-19, MRNA, LNP-S, PF, 30 MCG/0.3 ML DOSE VACCINE: CPT | Mod: PBBFAC,CV19 | Performed by: INTERNAL MEDICINE

## 2022-05-30 ENCOUNTER — LAB VISIT (OUTPATIENT)
Dept: LAB | Facility: OTHER | Age: 42
End: 2022-05-30
Attending: STUDENT IN AN ORGANIZED HEALTH CARE EDUCATION/TRAINING PROGRAM
Payer: COMMERCIAL

## 2022-05-30 ENCOUNTER — OFFICE VISIT (OUTPATIENT)
Dept: INTERNAL MEDICINE | Facility: CLINIC | Age: 42
End: 2022-05-30
Payer: COMMERCIAL

## 2022-05-30 ENCOUNTER — TELEPHONE (OUTPATIENT)
Dept: INTERNAL MEDICINE | Facility: CLINIC | Age: 42
End: 2022-05-30
Payer: COMMERCIAL

## 2022-05-30 VITALS
OXYGEN SATURATION: 99 % | HEART RATE: 65 BPM | WEIGHT: 135.56 LBS | BODY MASS INDEX: 21.24 KG/M2 | SYSTOLIC BLOOD PRESSURE: 125 MMHG | DIASTOLIC BLOOD PRESSURE: 65 MMHG

## 2022-05-30 DIAGNOSIS — R07.9 CHEST PAIN, UNSPECIFIED TYPE: ICD-10-CM

## 2022-05-30 DIAGNOSIS — Z00.00 HEALTH MAINTENANCE EXAMINATION: ICD-10-CM

## 2022-05-30 DIAGNOSIS — Z13.1 SCREENING FOR DIABETES MELLITUS: ICD-10-CM

## 2022-05-30 DIAGNOSIS — Z13.6 SCREENING FOR CARDIOVASCULAR CONDITION: ICD-10-CM

## 2022-05-30 DIAGNOSIS — R07.9 CHEST PAIN, UNSPECIFIED TYPE: Primary | ICD-10-CM

## 2022-05-30 LAB
ALBUMIN SERPL BCP-MCNC: 3.9 G/DL (ref 3.5–5.2)
ALP SERPL-CCNC: 45 U/L (ref 55–135)
ALT SERPL W/O P-5'-P-CCNC: 14 U/L (ref 10–44)
ANION GAP SERPL CALC-SCNC: 8 MMOL/L (ref 8–16)
AST SERPL-CCNC: 19 U/L (ref 10–40)
BASOPHILS # BLD AUTO: 0.02 K/UL (ref 0–0.2)
BASOPHILS NFR BLD: 0.5 % (ref 0–1.9)
BILIRUB SERPL-MCNC: 0.4 MG/DL (ref 0.1–1)
BUN SERPL-MCNC: 11 MG/DL (ref 6–20)
CALCIUM SERPL-MCNC: 9.9 MG/DL (ref 8.7–10.5)
CHLORIDE SERPL-SCNC: 105 MMOL/L (ref 95–110)
CHOLEST SERPL-MCNC: 180 MG/DL (ref 120–199)
CHOLEST/HDLC SERPL: 3 {RATIO} (ref 2–5)
CO2 SERPL-SCNC: 28 MMOL/L (ref 23–29)
CREAT SERPL-MCNC: 0.9 MG/DL (ref 0.5–1.4)
DIFFERENTIAL METHOD: ABNORMAL
EOSINOPHIL # BLD AUTO: 0 K/UL (ref 0–0.5)
EOSINOPHIL NFR BLD: 0.9 % (ref 0–8)
ERYTHROCYTE [DISTWIDTH] IN BLOOD BY AUTOMATED COUNT: 13.5 % (ref 11.5–14.5)
EST. GFR  (AFRICAN AMERICAN): >60 ML/MIN/1.73 M^2
EST. GFR  (NON AFRICAN AMERICAN): >60 ML/MIN/1.73 M^2
ESTIMATED AVG GLUCOSE: 94 MG/DL (ref 68–131)
GLUCOSE SERPL-MCNC: 59 MG/DL (ref 70–110)
HBA1C MFR BLD: 4.9 % (ref 4–5.6)
HCT VFR BLD AUTO: 44 % (ref 37–48.5)
HDLC SERPL-MCNC: 60 MG/DL (ref 40–75)
HDLC SERPL: 33.3 % (ref 20–50)
HGB BLD-MCNC: 14 G/DL (ref 12–16)
IMM GRANULOCYTES # BLD AUTO: 0 K/UL (ref 0–0.04)
IMM GRANULOCYTES NFR BLD AUTO: 0 % (ref 0–0.5)
LDLC SERPL CALC-MCNC: 102.2 MG/DL (ref 63–159)
LYMPHOCYTES # BLD AUTO: 1.5 K/UL (ref 1–4.8)
LYMPHOCYTES NFR BLD: 33 % (ref 18–48)
MCH RBC QN AUTO: 30.9 PG (ref 27–31)
MCHC RBC AUTO-ENTMCNC: 31.8 G/DL (ref 32–36)
MCV RBC AUTO: 97 FL (ref 82–98)
MONOCYTES # BLD AUTO: 0.5 K/UL (ref 0.3–1)
MONOCYTES NFR BLD: 10.3 % (ref 4–15)
NEUTROPHILS # BLD AUTO: 2.4 K/UL (ref 1.8–7.7)
NEUTROPHILS NFR BLD: 55.3 % (ref 38–73)
NONHDLC SERPL-MCNC: 120 MG/DL
NRBC BLD-RTO: 0 /100 WBC
PLATELET # BLD AUTO: 282 K/UL (ref 150–450)
PMV BLD AUTO: 9.9 FL (ref 9.2–12.9)
POTASSIUM SERPL-SCNC: 4.3 MMOL/L (ref 3.5–5.1)
PROT SERPL-MCNC: 7.4 G/DL (ref 6–8.4)
RBC # BLD AUTO: 4.53 M/UL (ref 4–5.4)
SODIUM SERPL-SCNC: 141 MMOL/L (ref 136–145)
TRIGL SERPL-MCNC: 89 MG/DL (ref 30–150)
TSH SERPL DL<=0.005 MIU/L-ACNC: 1.61 UIU/ML (ref 0.4–4)
WBC # BLD AUTO: 4.39 K/UL (ref 3.9–12.7)

## 2022-05-30 PROCEDURE — 93010 ELECTROCARDIOGRAM REPORT: CPT | Mod: S$GLB,,, | Performed by: INTERNAL MEDICINE

## 2022-05-30 PROCEDURE — 80053 COMPREHEN METABOLIC PANEL: CPT | Performed by: STUDENT IN AN ORGANIZED HEALTH CARE EDUCATION/TRAINING PROGRAM

## 2022-05-30 PROCEDURE — 3074F PR MOST RECENT SYSTOLIC BLOOD PRESSURE < 130 MM HG: ICD-10-PCS | Mod: CPTII,S$GLB,, | Performed by: STUDENT IN AN ORGANIZED HEALTH CARE EDUCATION/TRAINING PROGRAM

## 2022-05-30 PROCEDURE — 80061 LIPID PANEL: CPT | Performed by: STUDENT IN AN ORGANIZED HEALTH CARE EDUCATION/TRAINING PROGRAM

## 2022-05-30 PROCEDURE — 99999 PR PBB SHADOW E&M-EST. PATIENT-LVL III: ICD-10-PCS | Mod: PBBFAC,,, | Performed by: STUDENT IN AN ORGANIZED HEALTH CARE EDUCATION/TRAINING PROGRAM

## 2022-05-30 PROCEDURE — 84443 ASSAY THYROID STIM HORMONE: CPT | Performed by: STUDENT IN AN ORGANIZED HEALTH CARE EDUCATION/TRAINING PROGRAM

## 2022-05-30 PROCEDURE — 83036 HEMOGLOBIN GLYCOSYLATED A1C: CPT | Performed by: STUDENT IN AN ORGANIZED HEALTH CARE EDUCATION/TRAINING PROGRAM

## 2022-05-30 PROCEDURE — 99213 OFFICE O/P EST LOW 20 MIN: CPT | Mod: S$GLB,,, | Performed by: STUDENT IN AN ORGANIZED HEALTH CARE EDUCATION/TRAINING PROGRAM

## 2022-05-30 PROCEDURE — 99999 PR PBB SHADOW E&M-EST. PATIENT-LVL III: CPT | Mod: PBBFAC,,, | Performed by: STUDENT IN AN ORGANIZED HEALTH CARE EDUCATION/TRAINING PROGRAM

## 2022-05-30 PROCEDURE — 99213 PR OFFICE/OUTPT VISIT, EST, LEVL III, 20-29 MIN: ICD-10-PCS | Mod: S$GLB,,, | Performed by: STUDENT IN AN ORGANIZED HEALTH CARE EDUCATION/TRAINING PROGRAM

## 2022-05-30 PROCEDURE — 1160F PR REVIEW ALL MEDS BY PRESCRIBER/CLIN PHARMACIST DOCUMENTED: ICD-10-PCS | Mod: CPTII,S$GLB,, | Performed by: STUDENT IN AN ORGANIZED HEALTH CARE EDUCATION/TRAINING PROGRAM

## 2022-05-30 PROCEDURE — 1159F PR MEDICATION LIST DOCUMENTED IN MEDICAL RECORD: ICD-10-PCS | Mod: CPTII,S$GLB,, | Performed by: STUDENT IN AN ORGANIZED HEALTH CARE EDUCATION/TRAINING PROGRAM

## 2022-05-30 PROCEDURE — 3044F PR MOST RECENT HEMOGLOBIN A1C LEVEL <7.0%: ICD-10-PCS | Mod: CPTII,S$GLB,, | Performed by: STUDENT IN AN ORGANIZED HEALTH CARE EDUCATION/TRAINING PROGRAM

## 2022-05-30 PROCEDURE — 3078F PR MOST RECENT DIASTOLIC BLOOD PRESSURE < 80 MM HG: ICD-10-PCS | Mod: CPTII,S$GLB,, | Performed by: STUDENT IN AN ORGANIZED HEALTH CARE EDUCATION/TRAINING PROGRAM

## 2022-05-30 PROCEDURE — 3008F BODY MASS INDEX DOCD: CPT | Mod: CPTII,S$GLB,, | Performed by: STUDENT IN AN ORGANIZED HEALTH CARE EDUCATION/TRAINING PROGRAM

## 2022-05-30 PROCEDURE — 3008F PR BODY MASS INDEX (BMI) DOCUMENTED: ICD-10-PCS | Mod: CPTII,S$GLB,, | Performed by: STUDENT IN AN ORGANIZED HEALTH CARE EDUCATION/TRAINING PROGRAM

## 2022-05-30 PROCEDURE — 85025 COMPLETE CBC W/AUTO DIFF WBC: CPT | Performed by: STUDENT IN AN ORGANIZED HEALTH CARE EDUCATION/TRAINING PROGRAM

## 2022-05-30 PROCEDURE — 1160F RVW MEDS BY RX/DR IN RCRD: CPT | Mod: CPTII,S$GLB,, | Performed by: STUDENT IN AN ORGANIZED HEALTH CARE EDUCATION/TRAINING PROGRAM

## 2022-05-30 PROCEDURE — 3078F DIAST BP <80 MM HG: CPT | Mod: CPTII,S$GLB,, | Performed by: STUDENT IN AN ORGANIZED HEALTH CARE EDUCATION/TRAINING PROGRAM

## 2022-05-30 PROCEDURE — 1159F MED LIST DOCD IN RCRD: CPT | Mod: CPTII,S$GLB,, | Performed by: STUDENT IN AN ORGANIZED HEALTH CARE EDUCATION/TRAINING PROGRAM

## 2022-05-30 PROCEDURE — 93005 EKG 12-LEAD: ICD-10-PCS | Mod: S$GLB,,, | Performed by: STUDENT IN AN ORGANIZED HEALTH CARE EDUCATION/TRAINING PROGRAM

## 2022-05-30 PROCEDURE — 36415 COLL VENOUS BLD VENIPUNCTURE: CPT | Performed by: STUDENT IN AN ORGANIZED HEALTH CARE EDUCATION/TRAINING PROGRAM

## 2022-05-30 PROCEDURE — 3074F SYST BP LT 130 MM HG: CPT | Mod: CPTII,S$GLB,, | Performed by: STUDENT IN AN ORGANIZED HEALTH CARE EDUCATION/TRAINING PROGRAM

## 2022-05-30 PROCEDURE — 93010 EKG 12-LEAD: ICD-10-PCS | Mod: S$GLB,,, | Performed by: INTERNAL MEDICINE

## 2022-05-30 PROCEDURE — 3044F HG A1C LEVEL LT 7.0%: CPT | Mod: CPTII,S$GLB,, | Performed by: STUDENT IN AN ORGANIZED HEALTH CARE EDUCATION/TRAINING PROGRAM

## 2022-05-30 PROCEDURE — 93005 ELECTROCARDIOGRAM TRACING: CPT | Mod: S$GLB,,, | Performed by: STUDENT IN AN ORGANIZED HEALTH CARE EDUCATION/TRAINING PROGRAM

## 2022-05-30 RX ORDER — TAZAROTENE 0.1 MG/G
CREAM CUTANEOUS
COMMUNITY
Start: 2022-02-09

## 2022-05-30 NOTE — TELEPHONE ENCOUNTER
----- Message from Jonna Che sent at 5/30/2022  9:40 AM CDT -----   Name of Who is Calling:     What is the request in detail:  en route running late about 10mins Please contact to further discuss and advise      Can the clinic reply by [facility]:     What Number to Call Back if not in [facility]:

## 2022-05-30 NOTE — PROGRESS NOTES
Subjective:       Patient ID: Kathe Hudson is a 42 y.o. female.    Chief Complaint: Chest pain, unspecified type [R07.9]    Patient is new to me, PCP is Dr. Beard.    07MAY was diagnosed with COVID  Coughing frequently with COVID and week after   Had symptoms of sinus infection for the week prior  Endorses tightness, stinging type pain sometimes  Usually first thing in the morning  Worse with lying flat  Is improving in frequency since started, now occurring once daily  Has tried Pepcid, Tums, aloe vera juice  Will resolve with medications  Denies SOB, palpitations  Never smoker    Due for routine lab work    Review of Systems   Constitutional: Negative for chills and fever.   Respiratory: Negative for cough and shortness of breath.    Cardiovascular: Positive for chest pain. Negative for leg swelling.   Gastrointestinal: Negative for abdominal pain, constipation, diarrhea, nausea and vomiting.         Current Outpatient Medications   Medication Instructions    TAZORAC 0.1 % cream Topical (Top)     Objective:      Vitals:    05/30/22 1004   BP: 125/65   Pulse: 65   SpO2: 99%   Weight: 61.5 kg (135 lb 9.3 oz)   PainSc: 0-No pain     Body mass index is 21.24 kg/m².    Physical Exam  Vitals reviewed.   Constitutional:       General: She is not in acute distress.     Appearance: Normal appearance. She is not ill-appearing or diaphoretic.   HENT:      Head: Normocephalic and atraumatic.   Eyes:      Conjunctiva/sclera: Conjunctivae normal.   Cardiovascular:      Rate and Rhythm: Normal rate and regular rhythm.      Heart sounds: Normal heart sounds. No murmur heard.    No friction rub. No gallop.   Pulmonary:      Effort: Pulmonary effort is normal. No respiratory distress.      Breath sounds: Normal breath sounds. No stridor. No wheezing, rhonchi or rales.   Abdominal:      General: Bowel sounds are normal. There is no distension.      Palpations: Abdomen is soft. There is no mass.      Tenderness: There is no  abdominal tenderness. There is no guarding or rebound.   Musculoskeletal:         General: No swelling or deformity.   Skin:     General: Skin is warm and dry.      Comments: Color WNL   Neurological:      General: No focal deficit present.      Mental Status: She is alert. Mental status is at baseline.      Gait: Gait normal.   Psychiatric:         Mood and Affect: Mood normal.         Behavior: Behavior normal.         Assessment:       1. Chest pain, unspecified type    2. Health maintenance examination    3. Screening for diabetes mellitus    4. Screening for cardiovascular condition        Plan:       Chest pain, unspecified type  EKG sinus bradycardia  Discussed etiology of CP more consistent with GI vs MSK origin, discussed treatment  RTC if pain worsens or does not improve   -     IN OFFICE EKG 12-LEAD (to Muse)  -     CBC Auto Differential; Future  -     Comprehensive Metabolic Panel; Future  -     TSH; Future    Health maintenance examination  -     CBC Auto Differential; Future  -     Comprehensive Metabolic Panel; Future  -     TSH; Future    Screening for diabetes mellitus  -     Hemoglobin A1C; Future    Screening for cardiovascular condition  -     Lipid Panel; Future        Leandra Resendiz MD  5/30/2022

## 2022-06-03 ENCOUNTER — PATIENT MESSAGE (OUTPATIENT)
Dept: INTERNAL MEDICINE | Facility: CLINIC | Age: 42
End: 2022-06-03
Payer: COMMERCIAL

## 2022-06-03 DIAGNOSIS — R10.9 ABDOMINAL PAIN, UNSPECIFIED ABDOMINAL LOCATION: Primary | ICD-10-CM

## 2022-06-03 NOTE — TELEPHONE ENCOUNTER
Treatment for both were discussed at last appointment, if she would like to come in to discuss treatments again I recommend scheduling appointment. I also placed a referral for GI to discuss. Please let patient know, thank you!

## 2022-06-05 ENCOUNTER — PATIENT MESSAGE (OUTPATIENT)
Dept: INTERNAL MEDICINE | Facility: CLINIC | Age: 42
End: 2022-06-05
Payer: COMMERCIAL

## 2022-06-06 ENCOUNTER — HOSPITAL ENCOUNTER (EMERGENCY)
Facility: HOSPITAL | Age: 42
Discharge: HOME | End: 2022-06-06
Attending: EMERGENCY MEDICINE
Payer: COMMERCIAL

## 2022-06-06 VITALS
HEART RATE: 53 BPM | BODY MASS INDEX: 21.03 KG/M2 | OXYGEN SATURATION: 100 % | DIASTOLIC BLOOD PRESSURE: 69 MMHG | RESPIRATION RATE: 16 BRPM | WEIGHT: 134 LBS | TEMPERATURE: 99 F | SYSTOLIC BLOOD PRESSURE: 143 MMHG | HEIGHT: 67 IN

## 2022-06-06 DIAGNOSIS — R07.9 CHEST PAIN, UNSPECIFIED TYPE: Primary | ICD-10-CM

## 2022-06-06 DIAGNOSIS — R07.89 CHEST DISCOMFORT: ICD-10-CM

## 2022-06-06 PROBLEM — Z86.16 HISTORY OF COVID-19: Status: ACTIVE | Noted: 2022-06-06

## 2022-06-06 LAB
ALBUMIN SERPL BCP-MCNC: 4.2 G/DL (ref 3.5–5.2)
ALP SERPL-CCNC: 42 U/L (ref 55–135)
ALT SERPL W/O P-5'-P-CCNC: 14 U/L (ref 10–44)
ANION GAP SERPL CALC-SCNC: 9 MMOL/L (ref 8–16)
AST SERPL-CCNC: 21 U/L (ref 10–40)
B-HCG UR QL: NEGATIVE
BASOPHILS # BLD AUTO: 0.02 K/UL (ref 0–0.2)
BASOPHILS NFR BLD: 0.5 % (ref 0–1.9)
BILIRUB SERPL-MCNC: 0.4 MG/DL (ref 0.1–1)
BNP SERPL-MCNC: <10 PG/ML (ref 0–99)
BUN SERPL-MCNC: 11 MG/DL (ref 6–20)
CALCIUM SERPL-MCNC: 10.2 MG/DL (ref 8.7–10.5)
CHLORIDE SERPL-SCNC: 107 MMOL/L (ref 95–110)
CO2 SERPL-SCNC: 24 MMOL/L (ref 23–29)
CREAT SERPL-MCNC: 0.9 MG/DL (ref 0.5–1.4)
CTP QC/QA: YES
D DIMER PPP IA.FEU-MCNC: 0.37 MG/L FEU
DIFFERENTIAL METHOD: ABNORMAL
EOSINOPHIL # BLD AUTO: 0 K/UL (ref 0–0.5)
EOSINOPHIL NFR BLD: 1.1 % (ref 0–8)
ERYTHROCYTE [DISTWIDTH] IN BLOOD BY AUTOMATED COUNT: 12.9 % (ref 11.5–14.5)
EST. GFR  (AFRICAN AMERICAN): >60 ML/MIN/1.73 M^2
EST. GFR  (NON AFRICAN AMERICAN): >60 ML/MIN/1.73 M^2
GLUCOSE SERPL-MCNC: 73 MG/DL (ref 70–110)
HCT VFR BLD AUTO: 44.4 % (ref 37–48.5)
HGB BLD-MCNC: 14.6 G/DL (ref 12–16)
IMM GRANULOCYTES # BLD AUTO: 0.01 K/UL (ref 0–0.04)
IMM GRANULOCYTES NFR BLD AUTO: 0.3 % (ref 0–0.5)
LYMPHOCYTES # BLD AUTO: 1.6 K/UL (ref 1–4.8)
LYMPHOCYTES NFR BLD: 42.7 % (ref 18–48)
MCH RBC QN AUTO: 30.8 PG (ref 27–31)
MCHC RBC AUTO-ENTMCNC: 32.9 G/DL (ref 32–36)
MCV RBC AUTO: 94 FL (ref 82–98)
MONOCYTES # BLD AUTO: 0.3 K/UL (ref 0.3–1)
MONOCYTES NFR BLD: 9.2 % (ref 4–15)
NEUTROPHILS # BLD AUTO: 1.7 K/UL (ref 1.8–7.7)
NEUTROPHILS NFR BLD: 46.2 % (ref 38–73)
NRBC BLD-RTO: 0 /100 WBC
PLATELET # BLD AUTO: 222 K/UL (ref 150–450)
PMV BLD AUTO: 10.1 FL (ref 9.2–12.9)
POTASSIUM SERPL-SCNC: 4.5 MMOL/L (ref 3.5–5.1)
PROT SERPL-MCNC: 8.1 G/DL (ref 6–8.4)
RBC # BLD AUTO: 4.74 M/UL (ref 4–5.4)
SODIUM SERPL-SCNC: 140 MMOL/L (ref 136–145)
TROPONIN I SERPL DL<=0.01 NG/ML-MCNC: <0.006 NG/ML (ref 0–0.03)
WBC # BLD AUTO: 3.7 K/UL (ref 3.9–12.7)

## 2022-06-06 PROCEDURE — 83880 ASSAY OF NATRIURETIC PEPTIDE: CPT | Performed by: EMERGENCY MEDICINE

## 2022-06-06 PROCEDURE — 99285 EMERGENCY DEPT VISIT HI MDM: CPT | Mod: 25

## 2022-06-06 PROCEDURE — 80053 COMPREHEN METABOLIC PANEL: CPT | Performed by: EMERGENCY MEDICINE

## 2022-06-06 PROCEDURE — 93010 EKG 12-LEAD: ICD-10-PCS | Mod: ,,, | Performed by: INTERNAL MEDICINE

## 2022-06-06 PROCEDURE — 93005 ELECTROCARDIOGRAM TRACING: CPT

## 2022-06-06 PROCEDURE — 85025 COMPLETE CBC W/AUTO DIFF WBC: CPT | Performed by: EMERGENCY MEDICINE

## 2022-06-06 PROCEDURE — 85379 FIBRIN DEGRADATION QUANT: CPT | Performed by: EMERGENCY MEDICINE

## 2022-06-06 PROCEDURE — 84484 ASSAY OF TROPONIN QUANT: CPT | Performed by: EMERGENCY MEDICINE

## 2022-06-06 PROCEDURE — 99284 EMERGENCY DEPT VISIT MOD MDM: CPT | Mod: ,,, | Performed by: EMERGENCY MEDICINE

## 2022-06-06 PROCEDURE — 99284 PR EMERGENCY DEPT VISIT,LEVEL IV: ICD-10-PCS | Mod: ,,, | Performed by: EMERGENCY MEDICINE

## 2022-06-06 PROCEDURE — 36000 PLACE NEEDLE IN VEIN: CPT

## 2022-06-06 PROCEDURE — 81025 URINE PREGNANCY TEST: CPT | Performed by: EMERGENCY MEDICINE

## 2022-06-06 PROCEDURE — 93010 ELECTROCARDIOGRAM REPORT: CPT | Mod: ,,, | Performed by: INTERNAL MEDICINE

## 2022-06-06 NOTE — ED PROVIDER NOTES
Encounter Date: 6/6/2022    SCRIBE #1 NOTE: I, Lindsay Wright, am scribing for, and in the presence of,  Bree Christopher MD. I have scribed the following portions of the note - the EKG reading. Other sections scribed: HPI, ROS, X-ray, PE.       History     Chief Complaint   Patient presents with    Chest Pain     Denies cardiac hx, started since covid in may     Time patient was seen by the provider: 9:11 AM      The patient is a 42 y.o. female with PMHx including: fibroids and recently covid  positive on 5/14 who presents to the ED with a complaint of chest pain with onset since she had COVID. She describes the pain as a pressure under her breast and left side. She notes that sometimes it feels like a tightness while other times it resembles burning. It varies in severity but is always present. Her PCP thought it might be GI related and told her to take pepcid, however, this did not significantly improve her symptoms. Last night the pain was so intense that she could not sleep. She had to sit up because laying down worsened the pain. She also reports fatigue, anxiety, and mild SOB with exertion. Denies lightheadedness, dizziness, headaches, vision changes, dysuria, leg swelling, and rash.    The history is provided by the patient and medical records. No  was used.     Review of patient's allergies indicates:  No Known Allergies     Past Medical History:   Diagnosis Date    Fibroids      Past Surgical History:   Procedure Laterality Date    Abdominal myomectomy via mini-laparotomy (2 fibroids  02/20/2018    pelvic pain, fibroids    COLONOSCOPY N/A 10/25/2021    Procedure: COLONOSCOPY, with me;  Surgeon: Sarah Price MD;  Location: [facility] ([address] [place] [address]);  Service: Endoscopy;  Laterality: N/A;  pt completed COVID vaccine- see Immunization record in chart-rb  10/19 lvm to confirm appt-rb  10/20 arrival time confirmed with pt-rb    FLEXIBLE SIGMOIDOSCOPY N/A 11/22/2021    Procedure:  SIGMOIDOSCOPY, FLEXIBLE;  Surgeon: Sarah Price MD;  Location: [facility] OR [address] [place] [address];  Service: Colon and Rectal;  Laterality: N/A;    MYOMECTOMY      WISDOM TOOTH EXTRACTION       Family History   Problem Relation Age of Onset    Breast cancer Maternal Grandmother         dx age unknown     Colon cancer Maternal Grandmother     Cancer Maternal Grandmother     Stroke Father     Hypertension Father     Ovarian cancer Neg Hx      Social History     Tobacco Use    Smoking status: Never Smoker    Smokeless tobacco: Never Used   Substance Use Topics    Alcohol use: No    Drug use: No     Review of Systems   Constitutional: Positive for fatigue.   HENT: Negative for congestion.    Eyes: Negative for visual disturbance.   Respiratory: Positive for shortness of breath (mild; with exertion).    Cardiovascular: Positive for chest pain (pressure; varying intensity). Negative for leg swelling.   Gastrointestinal: Negative for nausea and vomiting.   Genitourinary: Negative for dysuria.   Musculoskeletal: Negative for myalgias.   Skin: Negative for rash.   Neurological: Negative for dizziness, light-headedness and headaches.   Psychiatric/Behavioral: The patient is nervous/anxious.        Physical Exam     Initial Vitals [06/06/22 0821]   BP Pulse Resp Temp SpO2   132/83 60 18 98.9 °F (37.2 °C) 100 %      MAP       --         Physical Exam    Nursing note and vitals reviewed.  Constitutional: Vital signs are normal. She appears well-developed and well-nourished. She is not diaphoretic.  Non-toxic appearance. She does not appear ill. No distress.   HENT:   Head: Normocephalic and atraumatic.   Mouth/Throat: Mucous membranes are normal. Mucous membranes are not dry.   Eyes: Conjunctivae and lids are normal.   Neck: Neck supple. No JVD present.   Normal range of motion.  Cardiovascular: Normal rate, regular rhythm, normal heart sounds and intact distal pulses.   Pulmonary/Chest: Breath sounds normal. No respiratory  distress. She has no wheezes. She has no rales.   Abdominal: Abdomen is soft. Bowel sounds are normal. She exhibits no distension. There is no abdominal tenderness. There is no rebound.   Musculoskeletal:         General: No tenderness or edema.      Cervical back: Normal range of motion and neck supple.     Neurological: She is alert and oriented to person, place, and time. GCS score is 15. GCS eye subscore is 4. GCS verbal subscore is 5. GCS motor subscore is 6.   Skin: Skin is warm, dry and intact. No pallor.   Psychiatric: She has a normal mood and affect. Her speech is normal and behavior is normal.         ED Course   Procedures  Labs Reviewed   CBC W/ AUTO DIFFERENTIAL - Abnormal; Notable for the following components:       Result Value    WBC 3.70 (*)     Gran # (ANC) 1.7 (*)     All other components within normal limits    Narrative:     Release to patient->Immediate   COMPREHENSIVE METABOLIC PANEL - Abnormal; Notable for the following components:    Alkaline Phosphatase 42 (*)     All other components within normal limits    Narrative:     Release to patient->Immediate   TROPONIN I    Narrative:     Release to patient->Immediate   B-TYPE NATRIURETIC PEPTIDE    Narrative:     Release to patient->Immediate    ADD-ON BNP #[id number] PER GHAZALA SANDOVAL MD 12:54    D DIMER, QUANTITATIVE   B-TYPE NATRIURETIC PEPTIDE   POCT URINE PREGNANCY     EKG Readings: (Independently Interpreted)   Initial Reading: No STEMI. Rhythm: Sinus Bradycardia. Heart Rate: 59. Axis: Normal.   No acute ischemic changes     ECG Results          EKG 12-lead (Final result)  Result time 06/06/22 09:47:22    Final result by Interface, Lab In [facility] (06/06/22 09:47:22)                 Narrative:    Test Reason : R07.89,    Vent. Rate : 059 BPM     Atrial Rate : 059 BPM     P-R Int : 134 ms          QRS Dur : 092 ms      QT Int : 414 ms       P-R-T Axes : 062 081 050 degrees     QTc Int : 409 ms    Sinus bradycardia  Incomplete right bundle  branch block  Borderline Abnormal ECG  When compared with ECG of 30-MAY-2022 10:53,  No significant change was found  Confirmed by Ned ROJAS MD ([id number]) on 6/6/2022 9:46:44 AM    Referred By: [email or web address]   SELF           Confirmed By:Ned ROJAS MD                            Imaging Results          X-Ray Chest PA And Lateral (Final result)  Result time 06/06/22 09:54:23    Final result by Michel Guevara MD (06/06/22 09:54:23)                 Impression:      Normal chest      Electronically signed by: Michel Guevara MD  Date:    06/06/2022  Time:    09:54             Narrative:    EXAMINATION:  XR CHEST PA AND LATERAL    CLINICAL HISTORY:  Chest Pain;    TECHNIQUE:  PA and lateral views of the chest were performed.    COMPARISON:  None    FINDINGS:  Heart size and mediastinal contour normal.  Lungs are clear.  No lung consolidation, pleural effusion, or pneumothorax is seen.  The skeletal structures are intact.                              X-Rays:   Independently Interpreted Readings:   Chest X-Ray: No infiltrates.  No acute abnormalities.  Normal heart size. No pneumothorax  No pleural effusion     Medications - No data to display     Medical Decision Making:   History:   Old Medical Records: I decided to obtain old medical records.  Initial Assessment:   41 y/o F with left sided chest pain since having covid  Ddx: PE, ACS, GERD/gastritis, pleural effusion, musculoskeletal pain  Routine blood work and d-dimer, CXR  Independently Interpreted Test(s):   I have ordered and independently interpreted X-rays - see prior notes.  I have ordered and independently interpreted EKG Reading(s) - see prior notes  Clinical Tests:   Lab Tests: Ordered and Reviewed  Radiological Study: Ordered and Reviewed  Medical Tests: Ordered and Reviewed  ED Management:  Blood work unremarkable. Including d-dimer and troponin. CXR with no acute abnormality. Pt discharged to home. All questions answered. Pt given amb referral to GI and  cardiology          Scribe Attestation:   Scribe #1: I performed the above scribed service and the documentation accurately describes the services I performed. I attest to the accuracy of the note.    Attending Attestation:           Physician Attestation for Scribe:  Physician Attestation Statement for Scribe #1: I, Lindsay Wright, reviewed documentation, as scribed by in my presence, and it is both accurate and complete.                      Clinical Impression:   Final diagnoses:  [R07.89] Chest discomfort  [R07.9] Chest pain, unspecified type (Primary)          ED Disposition Condition    Discharge Stable        ED Prescriptions     None        Follow-up Information     Follow up With Specialties Details Why Contact Info Additional Information    Jim Beard MD Family Medicine, Internal Medicine Schedule an appointment as soon as possible for a visit   [address] [facility] [address]  [phone]       [facility] - Cardiology - 3rd Fl Cardiology Schedule an appointment as soon as possible for a visit   [address] [facility] [id number] [phone] Cardiology Services Clinics - 3rd floor    [facility] - Gi Center [facility] 4th Fl Gastroenterology Schedule an appointment as soon as possible for a visit   [address] [facility] [id number] [phone] GI Center & Urology - Main [facility], 4th Floor Please park in [facility] and take Atrium elevator           Bree Christopher MD  06/06/22 8635

## 2022-06-06 NOTE — ED NOTES
"Pt with chest pain since being diagnosed with covid may 14th.  Pt reports a "tightenss/heaviness".  Pt reports inability to lay on left side due to pain.  Pt reports that she is having to take deep breaths.        LOC: The patient is awake, alert and aware of environment with an appropriate affect, the patient is oriented x 3 and speaking appropriately.  APPEARANCE: Patient resting comfortably and in no acute distress, patient is clean and well groomed, patient's clothing is properly fastened.  SKIN: The skin is warm and dry, color consistent with ethnicity, patient has normal skin turgor and moist mucus membranes, skin intact, no breakdown or bruising noted.  MUSCULOSKELETAL: Patient moving all extremities spontaneously, no obvious swelling or deformities noted.  RESPIRATORY: Airway is open and patent, respirations are spontaneous, patient has a normal effort and rate, no accessory muscle use noted.  ABDOMEN: Soft and non tender to palpation, no distention noted.  "

## 2022-06-06 NOTE — DISCHARGE INSTRUCTIONS
Take motrin if needed for pain  Follow up with your doctor.  Return to ED for persistent or worsening pain, shortness of breath, fevers or any other concerns

## 2022-06-09 ENCOUNTER — OFFICE VISIT (OUTPATIENT)
Dept: GASTROENTEROLOGY | Facility: CLINIC | Age: 42
End: 2022-06-09
Payer: COMMERCIAL

## 2022-06-09 VITALS — HEIGHT: 67 IN | WEIGHT: 132.19 LBS | BODY MASS INDEX: 20.75 KG/M2

## 2022-06-09 DIAGNOSIS — R10.9 ABDOMINAL PAIN, UNSPECIFIED ABDOMINAL LOCATION: ICD-10-CM

## 2022-06-09 DIAGNOSIS — R14.2 BELCHING: Primary | ICD-10-CM

## 2022-06-09 DIAGNOSIS — R07.9 CHEST PAIN, UNSPECIFIED TYPE: ICD-10-CM

## 2022-06-09 PROCEDURE — 99999 PR PBB SHADOW E&M-EST. PATIENT-LVL III: ICD-10-PCS | Mod: PBBFAC,,, | Performed by: NURSE PRACTITIONER

## 2022-06-09 PROCEDURE — 1159F PR MEDICATION LIST DOCUMENTED IN MEDICAL RECORD: ICD-10-PCS | Mod: CPTII,S$GLB,, | Performed by: NURSE PRACTITIONER

## 2022-06-09 PROCEDURE — 1159F MED LIST DOCD IN RCRD: CPT | Mod: CPTII,S$GLB,, | Performed by: NURSE PRACTITIONER

## 2022-06-09 PROCEDURE — 99204 OFFICE O/P NEW MOD 45 MIN: CPT | Mod: S$GLB,,, | Performed by: NURSE PRACTITIONER

## 2022-06-09 PROCEDURE — 3044F PR MOST RECENT HEMOGLOBIN A1C LEVEL <7.0%: ICD-10-PCS | Mod: CPTII,S$GLB,, | Performed by: NURSE PRACTITIONER

## 2022-06-09 PROCEDURE — 3008F BODY MASS INDEX DOCD: CPT | Mod: CPTII,S$GLB,, | Performed by: NURSE PRACTITIONER

## 2022-06-09 PROCEDURE — 3044F HG A1C LEVEL LT 7.0%: CPT | Mod: CPTII,S$GLB,, | Performed by: NURSE PRACTITIONER

## 2022-06-09 PROCEDURE — 99204 PR OFFICE/OUTPT VISIT, NEW, LEVL IV, 45-59 MIN: ICD-10-PCS | Mod: S$GLB,,, | Performed by: NURSE PRACTITIONER

## 2022-06-09 PROCEDURE — 99999 PR PBB SHADOW E&M-EST. PATIENT-LVL III: CPT | Mod: PBBFAC,,, | Performed by: NURSE PRACTITIONER

## 2022-06-09 PROCEDURE — 3008F PR BODY MASS INDEX (BMI) DOCUMENTED: ICD-10-PCS | Mod: CPTII,S$GLB,, | Performed by: NURSE PRACTITIONER

## 2022-06-09 RX ORDER — IBUPROFEN 600 MG/1
600 TABLET ORAL 3 TIMES DAILY
Qty: 90 TABLET | Refills: 0 | Status: SHIPPED | OUTPATIENT
Start: 2022-06-09 | End: 2022-07-09

## 2022-06-09 RX ORDER — OMEPRAZOLE 40 MG/1
40 CAPSULE, DELAYED RELEASE ORAL DAILY
Qty: 30 CAPSULE | Refills: 2 | Status: SHIPPED | OUTPATIENT
Start: 2022-06-09 | End: 2022-09-14

## 2022-06-09 NOTE — PROGRESS NOTES
GASTROENTEROLOGY CLINIC NOTE    Chief Complaint: The primary encounter diagnosis was Belching. A diagnosis of Chest pain, unspecified type was also pertinent to this visit.  Referring provider/PCP: Jim Beard MD    HPI:  Kathe Hudson is a 42 y.o. female who is a new patient to me with a PMH that's significant for benign neuroendocrine neoplasm of the rectum.  She is here today to establish care for chest pain. This is a new problem that began a few weeks ago. She was initially evaluated by internal medicine who believed the pain was musculoskeletal in nature. She recently sought care in the emergency room due to worsening chest pain. She was further evaluated and referral was placed to both GI and cardiology. She reports a left-sided chest pain that is described as a pressure/burning sensation. It is aggravated with laying down. The pain waxes and wanes and begins below her left clavicle and wraps around her breast. It is accompanied by belching and shortness of breath. The belching recently started within the last week. The pain is not aggravated with deep inspiration but is tender with palpation and is worse with laying down. Denies reflux, pyrosis, waterbrash, dysphasia, nausea, vomiting, or frequent throat clearing. She originally tried Pepcid but did not have improvement in symptoms. She began taking omeprazole 20 mg approximately three days ago.  She has also tried Motrin and reports mild improvement.     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  3mm neuroendocrine tumor grade 1 in rectum  11/2021 Flex sig with full-thickness transitional excision    Family h/o Colon Cancer: No  Family h/o Crohn's Disease or Ulcerative Colitis: No  Family h/o Celiac Sprue: No  Abdominal Surgeries: No    Review of Systems   Constitutional: Negative  "for weight loss.   HENT: Negative for sore throat.    Eyes: Negative for blurred vision.   Respiratory: Negative for cough.    Cardiovascular: Positive for chest pain.   Gastrointestinal: Negative for abdominal pain, blood in stool, constipation, diarrhea, heartburn, melena, nausea and vomiting.        Belching   Genitourinary: Negative for dysuria.   Musculoskeletal: Negative for myalgias.   Skin: Negative for rash.   Neurological: Negative for headaches.   Endo/Heme/Allergies: Negative for environmental allergies.   Psychiatric/Behavioral: Negative for suicidal ideas. The patient is not nervous/anxious.        Past Medical History: has a past medical history of Fibroids.    Past Surgical History: has a past surgical history that includes [place] tooth extraction; Abdominal myomectomy via mini-laparotomy (2 fibroids (02/20/2018); Myomectomy; Colonoscopy (N/A, 10/25/2021); and Flexible sigmoidoscopy (N/A, 11/22/2021).    Family History:family history includes Breast cancer in her maternal grandmother; Cancer in her maternal grandmother; Colon cancer in her maternal grandmother; Hypertension in her father; Stroke in her father.    Allergies: Review of patient's allergies indicates:  No Known Allergies    Social History: reports that she has never smoked. She has never used smokeless tobacco. She reports that she does not drink alcohol and does not use drugs.    Home medications:   Current Outpatient Medications on File Prior to Visit   Medication Sig Dispense Refill    TAZORAC 0.1 % cream Apply topically.       No current facility-administered medications on file prior to visit.       Vital signs:  Ht 5' 7" (1.702 m)   Wt 60 kg (132 lb 3 oz)   LMP 05/22/2022   BMI 20.70 kg/m²     Physical Exam  Vitals reviewed.   Constitutional:       General: She is not in acute distress.     Appearance: Normal appearance. She is not ill-appearing.   HENT:      Head: Normocephalic.   Cardiovascular:      Rate and Rhythm: Normal " rate and regular rhythm.      Heart sounds: Normal heart sounds. No murmur heard.  Pulmonary:      Effort: Pulmonary effort is normal. No respiratory distress.      Breath sounds: Normal breath sounds.   Chest:      Chest wall: No tenderness.   Abdominal:      General: Bowel sounds are normal. There is no distension.      Palpations: Abdomen is soft.      Tenderness: There is no abdominal tenderness. Negative signs include Moran's sign.      Hernia: No hernia is present.   Musculoskeletal:         General: Tenderness present.        Arms:       Comments: Tenderness with palpation to marked areas.    Skin:     General: Skin is warm.   Neurological:      Mental Status: She is alert and oriented to person, place, and time.   Psychiatric:         Mood and Affect: Mood normal.         Behavior: Behavior normal.         Routine labs:  Lab Results   Component Value Date    WBC 3.70 (L) 06/06/2022    HGB 14.6 06/06/2022    HCT 44.4 06/06/2022    MCV 94 06/06/2022     06/06/2022     No results found for: INR  Lab Results   Component Value Date    IRON 135 07/31/2020    FERRITIN 8 (L) 07/31/2020    TIBC 462 (H) 07/31/2020    FESATURATED 29 07/31/2020     Lab Results   Component Value Date     06/06/2022    K 4.5 06/06/2022     06/06/2022    CO2 24 06/06/2022    BUN 11 06/06/2022    CREATININE 0.9 06/06/2022     Lab Results   Component Value Date    ALBUMIN 4.2 06/06/2022    ALT 14 06/06/2022    AST 21 06/06/2022    ALKPHOS 42 (L) 06/06/2022    BILITOT 0.4 06/06/2022     No results found for: GLUCOSE  Lab Results   Component Value Date    TSH 1.612 05/30/2022     Lab Results   Component Value Date    CALCIUM 10.2 06/06/2022       Imaging:  X-Ray Chest PA And Lateral  Narrative: EXAMINATION:  XR CHEST PA AND LATERAL    CLINICAL HISTORY:  Chest Pain;    TECHNIQUE:  PA and lateral views of the chest were performed.    COMPARISON:  None    FINDINGS:  Heart size and mediastinal contour normal.  Lungs are clear.   No lung consolidation, pleural effusion, or pneumothorax is seen.  The skeletal structures are intact.  Impression: Normal chest    Electronically signed by: Michel Guevara MD  Date:    06/06/2022  Time:    09:54      I have reviewed prior labs, imaging, and notes.      Assessment:  1. Belching    2. Chest pain, unspecified type        Plan:  Orders Placed This Encounter    ibuprofen (ADVIL,MOTRIN) 600 MG tablet    omeprazole (PRILOSEC) 40 MG capsule      Increase omeprazole to 40 mg daily. Take 30 minutes before first meal of the day.  Ibuprofen as needed for pain. Patient advised to take ibuprofen with food  Suspect pain is musculoskeletal in nature.  Pain was reproducible on exam. Additionally, she endorses frequent coughing when she had COVID and noted mild improvement with Motrin.  Will also trial a course of omeprazole d/t burning sensation in chest and frequent belching.       Plan of care discussed with patient who is in agreement and verbalized understanding.     I have explained the planned procedures to the patient.The risks, benefits and alternatives of the procedure were also explained in detail. Patient verbalized understanding, all questions were answered. The patient agrees to proceed as planned    Follow Up: As Needed          Sylvie Kim, EUSEBIO,FNP-BC  [facility]

## 2022-06-09 NOTE — PATIENT INSTRUCTIONS
Follow up with cardiology    Continue Motrin as needed for pain. Take with food.    Omeprazole 40mg once a day. Take 30 minutes before first meal of the day.

## 2022-06-21 ENCOUNTER — TELEPHONE (OUTPATIENT)
Dept: SURGERY | Facility: CLINIC | Age: 42
End: 2022-06-21
Payer: COMMERCIAL

## 2022-06-22 ENCOUNTER — OFFICE VISIT (OUTPATIENT)
Dept: CARDIOLOGY | Facility: CLINIC | Age: 42
End: 2022-06-22
Payer: COMMERCIAL

## 2022-06-22 VITALS
BODY MASS INDEX: 21.59 KG/M2 | HEIGHT: 67 IN | DIASTOLIC BLOOD PRESSURE: 64 MMHG | HEART RATE: 55 BPM | SYSTOLIC BLOOD PRESSURE: 128 MMHG | WEIGHT: 137.56 LBS

## 2022-06-22 DIAGNOSIS — Z86.16 HISTORY OF COVID-19: ICD-10-CM

## 2022-06-22 DIAGNOSIS — R07.9 CHEST PAIN OF UNKNOWN ETIOLOGY: ICD-10-CM

## 2022-06-22 DIAGNOSIS — R07.89 OTHER CHEST PAIN: Primary | ICD-10-CM

## 2022-06-22 DIAGNOSIS — D3A.8 BENIGN NEUROENDOCRINE NEOPLASM OF RECTUM: ICD-10-CM

## 2022-06-22 DIAGNOSIS — R07.9 CHEST PAIN, UNSPECIFIED TYPE: ICD-10-CM

## 2022-06-22 PROBLEM — R94.31 NONSPECIFIC ABNORMAL ELECTROCARDIOGRAM (ECG) (EKG): Status: ACTIVE | Noted: 2022-06-22

## 2022-06-22 PROCEDURE — 3008F PR BODY MASS INDEX (BMI) DOCUMENTED: ICD-10-PCS | Mod: CPTII,S$GLB,, | Performed by: INTERNAL MEDICINE

## 2022-06-22 PROCEDURE — 99205 PR OFFICE/OUTPT VISIT, NEW, LEVL V, 60-74 MIN: ICD-10-PCS | Mod: S$GLB,,, | Performed by: INTERNAL MEDICINE

## 2022-06-22 PROCEDURE — 1159F PR MEDICATION LIST DOCUMENTED IN MEDICAL RECORD: ICD-10-PCS | Mod: CPTII,S$GLB,, | Performed by: INTERNAL MEDICINE

## 2022-06-22 PROCEDURE — 3044F PR MOST RECENT HEMOGLOBIN A1C LEVEL <7.0%: ICD-10-PCS | Mod: CPTII,S$GLB,, | Performed by: INTERNAL MEDICINE

## 2022-06-22 PROCEDURE — 3074F SYST BP LT 130 MM HG: CPT | Mod: CPTII,S$GLB,, | Performed by: INTERNAL MEDICINE

## 2022-06-22 PROCEDURE — 99999 PR PBB SHADOW E&M-EST. PATIENT-LVL IV: CPT | Mod: PBBFAC,,, | Performed by: INTERNAL MEDICINE

## 2022-06-22 PROCEDURE — 3078F DIAST BP <80 MM HG: CPT | Mod: CPTII,S$GLB,, | Performed by: INTERNAL MEDICINE

## 2022-06-22 PROCEDURE — 3074F PR MOST RECENT SYSTOLIC BLOOD PRESSURE < 130 MM HG: ICD-10-PCS | Mod: CPTII,S$GLB,, | Performed by: INTERNAL MEDICINE

## 2022-06-22 PROCEDURE — 99205 OFFICE O/P NEW HI 60 MIN: CPT | Mod: S$GLB,,, | Performed by: INTERNAL MEDICINE

## 2022-06-22 PROCEDURE — 3078F PR MOST RECENT DIASTOLIC BLOOD PRESSURE < 80 MM HG: ICD-10-PCS | Mod: CPTII,S$GLB,, | Performed by: INTERNAL MEDICINE

## 2022-06-22 PROCEDURE — 99999 PR PBB SHADOW E&M-EST. PATIENT-LVL IV: ICD-10-PCS | Mod: PBBFAC,,, | Performed by: INTERNAL MEDICINE

## 2022-06-22 PROCEDURE — 3044F HG A1C LEVEL LT 7.0%: CPT | Mod: CPTII,S$GLB,, | Performed by: INTERNAL MEDICINE

## 2022-06-22 PROCEDURE — 3008F BODY MASS INDEX DOCD: CPT | Mod: CPTII,S$GLB,, | Performed by: INTERNAL MEDICINE

## 2022-06-22 PROCEDURE — 1159F MED LIST DOCD IN RCRD: CPT | Mod: CPTII,S$GLB,, | Performed by: INTERNAL MEDICINE

## 2022-06-22 NOTE — PROGRESS NOTES
"[facility]      Chief Complaint   Patient presents with    Chest Pain       Patient ID: Kathe Hudson is a 42 y.o. female with benign rectal tumor, history of COVID-19 infection, who presents for an initial appointment.  Pertinent history/events are as follows:     -Pt kindly referred by Dr. Christopher for evaluation of chest pain.     HPI:  Ms. Hudson reports being diagnosed with COVID on 5/14/2022.  One week later she began to have several episodes of chest pain.  Chest pain occurs at rest and on exertion.  Chest pain described as "straining", located under the left breast, radiating to upper and lower chest area, 6/10 in intensity, lasting 10 seconds, with no associated n/v/d.  She reports family history of CAD with MI in father at age 75.  No smoking history. She was evaluated for chest pain in the ED on 6/6/2022.  Workup revealed negative troponin.  EKG shows sinus bradycardia with RBBB.       Past Medical History:   Diagnosis Date    Fibroids      Past Surgical History:   Procedure Laterality Date    Abdominal myomectomy via mini-laparotomy (2 fibroids  02/20/2018    pelvic pain, fibroids    COLONOSCOPY N/A 10/25/2021    Procedure: COLONOSCOPY, with me;  Surgeon: Sarah Price MD;  Location: [facility] (4TH FLR);  Service: Endoscopy;  Laterality: N/A;  pt completed COVID vaccine- see Immunization record in chart-rb  10/19 lvm to confirm appt-rb  10/20 arrival time confirmed with pt-rb    FLEXIBLE SIGMOIDOSCOPY N/A 11/22/2021    Procedure: SIGMOIDOSCOPY, FLEXIBLE;  Surgeon: Sarah Price MD;  Location: [facility] OR 2ND FLR;  Service: Colon and Rectal;  Laterality: N/A;    MYOMECTOMY      WISDOM TOOTH EXTRACTION       Social History     Socioeconomic History    Marital status: Single   Tobacco Use    Smoking status: Never Smoker    Smokeless tobacco: Never Used   Substance and Sexual Activity    Alcohol use: No    Drug use: No    Sexual activity: Not Currently     Family " "History   Problem Relation Age of Onset    Breast cancer Maternal Grandmother         dx age unknown     Colon cancer Maternal Grandmother     Cancer Maternal Grandmother     Stroke Father     Hypertension Father     Ovarian cancer Neg Hx        Review of patient's allergies indicates:  No Known Allergies    Medication List with Changes/Refills   Current Medications    IBUPROFEN (ADVIL,MOTRIN) 600 MG TABLET    Take 1 tablet (600 mg total) by mouth 3 (three) times daily.    OMEPRAZOLE (PRILOSEC) 40 MG CAPSULE    Take 1 capsule (40 mg total) by mouth once daily.    TAZORAC 0.1 % CREAM    Apply topically as needed.       Review of Systems  Constitution: Denies chills, fever, and sweats.  HENT: Denies headaches or blurry vision.  Cardiovascular: Denies chest pain or irregular heart beat.  Respiratory: Denies cough or shortness of breath.  Gastrointestinal: Denies abdominal pain, nausea, or vomiting.  Musculoskeletal: Denies muscle cramps.  Neurological: Denies dizziness or focal weakness.  Psychiatric/Behavioral: Normal mental status.  Hematologic/Lymphatic: Denies bleeding problem or easy bruising/bleeding.  Skin: Denies rash or suspicious lesions    Physical Examination  /64   Pulse (!) 55   Ht 5' 7" (1.702 m)   Wt 62.4 kg (137 lb 9.1 oz)   BMI 21.55 kg/m²     Constitutional: No acute distress, conversant  HEENT: Sclera anicteric, Pupils equal, round and reactive to light, extraocular motions intact, Oropharynx clear  Neck: No JVD, no carotid bruits  Cardiovascular: regular rate and rhythm, no murmur, rubs or gallops, normal S1/S2  Pulmonary: Clear to auscultation bilaterally  Abdominal: Abdomen soft, nontender, nondistended, positive bowel sounds  Extremities: No lower extremity edema,   Pulses:  Carotid pulses are 2+ on the right side, and 2+ on the left side.  Radial pulses are 2+ on the right side, and 2+ on the left side.   Femoral pulses are 2+ on the right side, and 2+ on the left " side.  Popliteal pulses are 2+ on the right side, and 2+ on the left side.   Dorsalis pedis pulses are 2+ on the right side, and 2+ on the left side.   Posterior tibial pulses are 2+ on the right side, and 2+ on the left side.    Skin: No ecchymosis, erythema, or ulcers  Psych: Alert and oriented x 3, appropriate affect  Neuro: CNII-XII intact, no focal deficits    Labs:  Most Recent Data  CBC:   Lab Results   Component Value Date    WBC 3.70 (L) 06/06/2022    HGB 14.6 06/06/2022    HCT 44.4 06/06/2022     06/06/2022    MCV 94 06/06/2022    RDW 12.9 06/06/2022     BMP:   Lab Results   Component Value Date     06/06/2022    K 4.5 06/06/2022     06/06/2022    CO2 24 06/06/2022    BUN 11 06/06/2022    CREATININE 0.9 06/06/2022    GLU 73 06/06/2022    CALCIUM 10.2 06/06/2022     LFTS;   Lab Results   Component Value Date    PROT 8.1 06/06/2022    ALBUMIN 4.2 06/06/2022    BILITOT 0.4 06/06/2022    AST 21 06/06/2022    ALKPHOS 42 (L) 06/06/2022    ALT 14 06/06/2022     COAGS: No results found for: INR, PROTIME, PTT  FLP:   Lab Results   Component Value Date    CHOL 180 05/30/2022    HDL 60 05/30/2022    LDLCALC 102.2 05/30/2022    TRIG 89 05/30/2022    CHOLHDL 33.3 05/30/2022     CARDIAC:   Lab Results   Component Value Date    TROPONINI <0.006 06/06/2022    BNP <10 06/06/2022       Imaging:    EKG 6/6/2022:  Sinus bradycardia  Incomplete right bundle branch block    Assessment/Plan:  Kathe Hudson is a 42 y.o. female with benign rectal tumor, history of COVID-19 infection, who presents for an initial appointment.     1. Chest pain- Pt with symptoms post COVID.  ASCVD risk score low at 0.3%.  Check V/Q scan to rule out evidence of PE.  If no PE, proceed with exercise stress echo.    Will call pt with results of studies  Otherwise, follow up in 3 months     Total duration of face to face visit time 45 minutes.  Total time spent counseling greater than fifty percent of total visit  time.  Counseling included discussion regarding imaging findings, diagnosis, possibilities, treatment options, risks and benefits.  The patient had many questions regarding the options and long-term effects.    Mikhail Lake MD, PhD  Interventional Cardiology

## 2022-06-22 NOTE — PATIENT INSTRUCTIONS
Assessment/Plan:  Kathe Hudson is a 42 y.o. female with benign rectal tumor, history of COVID-19 infection, who presents for an initial appointment.     1. Chest pain- Pt with symptoms post COVID.  ASCVD risk score low at 0.3%.  Check V/Q scan to rule out evidence of PE.  If no PE, proceed with exercise stress echo.    Will call pt with results of studies  Otherwise, follow up in 3 months

## 2022-09-12 ENCOUNTER — TELEPHONE (OUTPATIENT)
Dept: ORTHOPEDICS | Facility: CLINIC | Age: 42
End: 2022-09-12
Payer: COMMERCIAL

## 2022-09-12 DIAGNOSIS — R52 PAIN: Primary | ICD-10-CM

## 2022-09-14 ENCOUNTER — OFFICE VISIT (OUTPATIENT)
Dept: ORTHOPEDICS | Facility: CLINIC | Age: 42
End: 2022-09-14
Payer: COMMERCIAL

## 2022-09-14 ENCOUNTER — HOSPITAL ENCOUNTER (OUTPATIENT)
Dept: RADIOLOGY | Facility: HOSPITAL | Age: 42
Discharge: HOME | End: 2022-09-14
Attending: ORTHOPAEDIC SURGERY
Payer: COMMERCIAL

## 2022-09-14 VITALS
SYSTOLIC BLOOD PRESSURE: 130 MMHG | DIASTOLIC BLOOD PRESSURE: 81 MMHG | HEIGHT: 67 IN | WEIGHT: 137.13 LBS | HEART RATE: 64 BPM | BODY MASS INDEX: 21.52 KG/M2

## 2022-09-14 DIAGNOSIS — S86.811A PATELLAR TENDON STRAIN, RIGHT, INITIAL ENCOUNTER: Primary | ICD-10-CM

## 2022-09-14 DIAGNOSIS — R52 PAIN: ICD-10-CM

## 2022-09-14 PROCEDURE — 3079F PR MOST RECENT DIASTOLIC BLOOD PRESSURE 80-89 MM HG: ICD-10-PCS | Mod: CPTII,S$GLB,, | Performed by: ORTHOPAEDIC SURGERY

## 2022-09-14 PROCEDURE — 1159F PR MEDICATION LIST DOCUMENTED IN MEDICAL RECORD: ICD-10-PCS | Mod: CPTII,S$GLB,, | Performed by: ORTHOPAEDIC SURGERY

## 2022-09-14 PROCEDURE — 3079F DIAST BP 80-89 MM HG: CPT | Mod: CPTII,S$GLB,, | Performed by: ORTHOPAEDIC SURGERY

## 2022-09-14 PROCEDURE — 99203 PR OFFICE/OUTPT VISIT, NEW, LEVL III, 30-44 MIN: ICD-10-PCS | Mod: S$GLB,,, | Performed by: ORTHOPAEDIC SURGERY

## 2022-09-14 PROCEDURE — 99999 PR PBB SHADOW E&M-EST. PATIENT-LVL III: CPT | Mod: PBBFAC,,, | Performed by: ORTHOPAEDIC SURGERY

## 2022-09-14 PROCEDURE — 3044F PR MOST RECENT HEMOGLOBIN A1C LEVEL <7.0%: ICD-10-PCS | Mod: CPTII,S$GLB,, | Performed by: ORTHOPAEDIC SURGERY

## 2022-09-14 PROCEDURE — 3008F BODY MASS INDEX DOCD: CPT | Mod: CPTII,S$GLB,, | Performed by: ORTHOPAEDIC SURGERY

## 2022-09-14 PROCEDURE — 3008F PR BODY MASS INDEX (BMI) DOCUMENTED: ICD-10-PCS | Mod: CPTII,S$GLB,, | Performed by: ORTHOPAEDIC SURGERY

## 2022-09-14 PROCEDURE — 1159F MED LIST DOCD IN RCRD: CPT | Mod: CPTII,S$GLB,, | Performed by: ORTHOPAEDIC SURGERY

## 2022-09-14 PROCEDURE — 3075F PR MOST RECENT SYSTOLIC BLOOD PRESS GE 130-139MM HG: ICD-10-PCS | Mod: CPTII,S$GLB,, | Performed by: ORTHOPAEDIC SURGERY

## 2022-09-14 PROCEDURE — 73564 XR KNEE COMP 4 OR MORE VIEWS RIGHT: ICD-10-PCS | Mod: 26,RT,, | Performed by: INTERNAL MEDICINE

## 2022-09-14 PROCEDURE — 73564 X-RAY EXAM KNEE 4 OR MORE: CPT | Mod: 26,RT,, | Performed by: INTERNAL MEDICINE

## 2022-09-14 PROCEDURE — 3044F HG A1C LEVEL LT 7.0%: CPT | Mod: CPTII,S$GLB,, | Performed by: ORTHOPAEDIC SURGERY

## 2022-09-14 PROCEDURE — 99999 PR PBB SHADOW E&M-EST. PATIENT-LVL III: ICD-10-PCS | Mod: PBBFAC,,, | Performed by: ORTHOPAEDIC SURGERY

## 2022-09-14 PROCEDURE — 73564 X-RAY EXAM KNEE 4 OR MORE: CPT | Mod: TC,FY,RT

## 2022-09-14 PROCEDURE — 3075F SYST BP GE 130 - 139MM HG: CPT | Mod: CPTII,S$GLB,, | Performed by: ORTHOPAEDIC SURGERY

## 2022-09-14 PROCEDURE — 99203 OFFICE O/P NEW LOW 30 MIN: CPT | Mod: S$GLB,,, | Performed by: ORTHOPAEDIC SURGERY

## 2022-09-14 RX ORDER — DICLOFENAC SODIUM 10 MG/G
2 GEL TOPICAL 3 TIMES DAILY
Qty: 50 G | Refills: 2 | Status: SHIPPED | OUTPATIENT
Start: 2022-09-14

## 2022-09-14 RX ORDER — ACETAMINOPHEN 325 MG/1
325 TABLET ORAL EVERY 6 HOURS PRN
COMMUNITY

## 2022-09-14 NOTE — PROGRESS NOTES
[place], Orthopedics and Sports Medicine  [facility]    New Patient Knee Office Visit  09/14/2022       Subjective:      Kathe Hudson is a 42 y.o. [facility] right knee pain. This is evaluated as a personal injury.  The patient has the following symptoms: pain located lateral joint line and distal patellar tendon .  She was injured while dancing 8/25/2022.  She has no instability; does endorse clicking in the knee.     The patient is a dance instructor.      Outside reports reviewed: historical medical records and office notes.    Past Medical History:   Diagnosis Date    Fibroids        Patient Active Problem List   Diagnosis    S/P myomectomy    Screening for malignant neoplasm of colon    Benign neuroendocrine neoplasm of rectum    Chest pain of unknown etiology    History of COVID-19    Nonspecific abnormal electrocardiogram (ECG) (EKG)    Patellar tendon strain, right, initial encounter       Past Surgical History:   Procedure Laterality Date    Abdominal myomectomy via mini-laparotomy (2 fibroids  02/20/2018    pelvic pain, fibroids    COLONOSCOPY N/A 10/25/2021    Procedure: COLONOSCOPY, with me;  Surgeon: Sarah Price MD;  Location: [facility] (4TH FLR);  Service: Endoscopy;  Laterality: N/A;  pt completed COVID vaccine- see Immunization record in chart-rb  10/19 lvm to confirm appt-rb  10/20 arrival time confirmed with pt-rb    FLEXIBLE SIGMOIDOSCOPY N/A 11/22/2021    Procedure: SIGMOIDOSCOPY, FLEXIBLE;  Surgeon: Sarah Price MD;  Location: [facility] OR 2ND FLR;  Service: Colon and Rectal;  Laterality: N/A;    MYOMECTOMY      WISDOM TOOTH EXTRACTION          Current Outpatient Medications   Medication Instructions    acetaminophen (TYLENOL) 325 mg, Oral, Every 6 hours PRN    diclofenac sodium (VOLTAREN) 2 g, Topical (Top), 3 times daily    omeprazole (PRILOSEC) 40 mg, Oral, Daily    TAZORAC 0.1 % cream  Topical (Top), As needed (PRN)        Review of patient's allergies indicates:  No Known Allergies    Social History     Socioeconomic History    Marital status: Single   Tobacco Use    Smoking status: Never    Smokeless tobacco: Never   Substance and Sexual Activity    Alcohol use: No    Drug use: No    Sexual activity: Not Currently       Family History   Problem Relation Age of Onset    Breast cancer Maternal Grandmother         dx age unknown     Colon cancer Maternal Grandmother     Cancer Maternal Grandmother     Stroke Father     Hypertension Father     Ovarian cancer Neg Hx          Review of Systems   Constitutional: Negative for chills and fever.   HENT:  Negative for hearing loss.    Eyes:  Negative for blurred vision.   Cardiovascular:  Negative for chest pain.   Respiratory:  Negative for shortness of breath.    Gastrointestinal:  Negative for abdominal pain.   Neurological:  Negative for light-headedness.          Objective:      General    Constitutional: She is oriented to person, place, and time. She appears well-developed and well-nourished.   HENT:   Head: Normocephalic and atraumatic.   Eyes: EOM are normal.   Cardiovascular:  Normal rate.            Pulmonary/Chest: Effort normal.   Neurological: She is alert and oriented to person, place, and time.   Psychiatric: She has a normal mood and affect.     General Musculoskeletal Exam   Gait: normal       Right Knee Exam     Inspection   Erythema: absent  Swelling: absent  Effusion: absent  Bruising: present    Tenderness   The patient is tender to palpation of the tibial tubercle and patellar tendon.    Crepitus   The patient has crepitus of the lateral joint line.    Range of Motion   Extension:  0   Flexion:  130     Tests   Meniscus   Silvino:  Medial - negative Lateral - positive  Ligament Examination   Lachman: normal (-1 to 2mm)   PCL-Posterior Drawer: normal (0 to 2mm)     MCL - Valgus: normal (0 to 2mm)  LCL - Varus: normal  Patella    Patellar apprehension: negative    Other   Sensation: normal    Left Knee Exam     Inspection   Erythema: absent  Swelling: absent  Effusion: absent    Tenderness   The patient is experiencing no tenderness.     Range of Motion   Extension:  0   Flexion:  130     Tests   Meniscus   Silvino:  Medial - negative Lateral - negative  Stability   Lachman: normal (-1 to 2mm)   PCL-Posterior Drawer: normal (0 to 2mm)  MCL - Valgus: normal (0 to 2mm)  LCL - Varus: normal (0 to 2mm)  Patella   Patellar apprehension: negative    Other   Sensation: normal    Muscle Strength   Right Lower Extremity   Quadriceps:  5/5   Hamstrin/5   Left Lower Extremity   Quadriceps:  5/5   Hamstrin/5     Vascular Exam     Right Pulses    Posterior Tibial:      2+        Left Pulses    Posterior Tibial:      2+        Imaging:  Radiographs of the right knee taken taken 2022 were personally reviewed from the [facility].  Multiple views of the knee are available today for review, including a standing AP, a standing notch view, lateral view, and a merchant view.  The tibiofemoral compartment demonstrates minimal degenerative changes.  The patellofemoral compartment demonstrates minimal degenerative changes.  No acute fractures or dislocations are noted in these images.       Procedures          Assessment:       Kathe Hudson is a 42 y.o. female seen in the office today. The encounter diagnosis was Patellar tendon strain, right, initial encounter.  Non-operative treatment is recommended at this time. The patient has evidence of a patellar tendon strain.  She might have some meniscus pathology as well, given the lateral sided knee pain she is experiencing.  Will try therapy and topical NSAID.  No CSI today due to symptoms not that bad.  Can call and have CSI if pain worsens with therapy vs discuss MRI. The natural history and expected course discussed with patient. Various treatment options were discussed, including  their risks and benefits. All of the patient's questions were answered.     Plan:      Physical therapy and rehabilitation treatment.  Tylenol 650mg TID, PRN pain.  Voltaren 1% topical gel, apply to affected area TID, PRN pain.  Follow up in 6 weeks.          Bob Denton IV, MD   of Clinical Orthopedics  Department of Orthopedic Surgery  [facility]  Office: [phone]  Website: [email or web address]      Orders Placed This Encounter    Ambulatory referral/consult to Physical/Occupational Therapy    diclofenac sodium (VOLTAREN) 1 % Gel

## 2022-10-04 ENCOUNTER — CLINICAL SUPPORT (OUTPATIENT)
Dept: REHABILITATION | Facility: HOSPITAL | Age: 42
End: 2022-10-04
Attending: ORTHOPAEDIC SURGERY
Payer: COMMERCIAL

## 2022-10-04 DIAGNOSIS — S86.811A PATELLAR TENDON STRAIN, RIGHT, INITIAL ENCOUNTER: ICD-10-CM

## 2022-10-04 DIAGNOSIS — M25.561 KNEE PAIN, RIGHT ANTERIOR: ICD-10-CM

## 2022-10-04 DIAGNOSIS — M67.969 TENDINOPATHY OF PATELLA: ICD-10-CM

## 2022-10-04 PROCEDURE — 97161 PT EVAL LOW COMPLEX 20 MIN: CPT

## 2022-10-04 PROCEDURE — 97110 THERAPEUTIC EXERCISES: CPT

## 2022-10-04 NOTE — PROGRESS NOTES
[facility]  Physical Therapy Initial Evaluation    Date: 10/4/2022   Name: Kathe Hudson  Clinic Number: [id number]    Therapy Diagnosis:   Encounter Diagnoses   Name Primary?    Patellar tendon strain, right, initial encounter     Knee pain, right anterior     Tendinopathy of patella      Physician: Bob Denton IV, MD    Physician Orders: PT Eval and Treat Knee pain, tendinopathy of patella  Medical Diagnosis from Referral: Patellar tendon strain, right, initial encounter [S86.811A]  Evaluation Date: 10/4/2022  Authorization Period Expiration: 9/14/2023  Plan of Care Expiration: 12/4/2022  Visit # / Visits authorized: 1/ 1    Time In: 5:05 pm  Time Out: 6:05 pm  Total Appointment Time (timed & untimed codes): 60 minutes    Precautions: Standard    Subjective   Date of onset: 5 weeks ago  History of current condition - Kathe reports: right knee pain started 5 weeks ago during dance . Patient does report a specific mechanism of injury including getting off the ground quickly. Aggravating factors include deeps squats, kneeling, and [place] split squats. Easing factors include ice and rest. Patient reports currently having difficulty with dance, working out     Medical History:   Past Medical History:   Diagnosis Date    Fibroids        Surgical History:   Kathe Hudson  has a past surgical history that includes [place] tooth extraction; Abdominal myomectomy via mini-laparotomy (2 fibroids (02/20/2018); Myomectomy; Colonoscopy (N/A, 10/25/2021); and Flexible sigmoidoscopy (N/A, 11/22/2021).    Medications:   Kathe has a current medication list which includes the following prescription(s): acetaminophen, diclofenac sodium, omeprazole, and tazorac.    Allergies:   Review of patient's allergies indicates:  No Known Allergies     Imaging, X-ray: XR KNEE COMP 4 OR MORE VIEWS RIGHT     CLINICAL HISTORY:  Pain, unspecified     TECHNIQUE:  Four views     COMPARISON:  None      FINDINGS:  The osseous structures are intact without evidence of fracture or osseous destruction.  No significant degenerative changes are noted.    Prior Therapy: No    Occupation: Dance instructor  Prior Level of Function: Unrestricted  Current Level of Function: Pain with deep squats, dancing, jumping    Pain:  Current 2/10, worst 5/10, best 1/10   Location: right knee  Description: Aching and Sharp  Aggravating Factors: Lifting and dance  Easing Factors: ice and rest    Patients goals: Return to [facility] for dance and working out    Objective       Observation:   B knee valgus         Range of Motion:   Knee ROM WNL but painful at end range flexion      Joint Mobility:  patellar hypomobility in R infrapatellar fatpad       Lower Extremity Strength      Right LE  Left LE    Knee extension: 4/5 * Knee extension: 5/5   Hip extension:  3+/5 Hip extension: 3+/5   PGM: 3-/5 PGM:  3-/5       Special Tests:    - Anterior Drawer: (-)    Functional Testing:     Single leg squat - B knee valgus    Squat - B knee valgus     Heel tap -  B knee valgus    Palpation:  TTP+ = tender to palpation   TTT++ = very tender to touch   (R) medial joint line TTP+  (R) inferior patella TTP+  (R) patellar tendon TTT++    Sensation: WNL        Limitation/Restriction for FOTO Knee Survey    Therapist reviewed FOTO scores for Kathe Hudson on 10/4/2022.   FOTO documents entered into [employer] - see Media section.    Limitation Score: 76%  Predcited Limitation Score: 37%         TREATMENT   Treatment Time In: 5:05 pm  Treatment Time Out: 6:05 pm  Total Treatment time (time-based codes) separate from Evaluation: 20 minutes    Kathe received therapeutic exercises to develop strength, endurance, ROM, and flexibility for 20 minutes including:  Quad tendon loading 5x45s holds w/ 2 min rest  TA activation edcuation  Bridges w/ GTB x 15  Lateral band walks GTB x 2 laps         Home Exercises and Patient Education Provided    Education provided:    - HEP  - POC/prognosis    Written Home Exercises Provided: yes.  Exercises were reviewed and Kathe was able to demonstrate them prior to the end of the session.  Kathe demonstrated good  understanding of the education provided.     See EMR under Patient Instructions for exercises provided 10/4/2022.    Assessment   Kathe is a 42 y.o. female referred to outpatient Physical Therapy with a medical diagnosis of Patellar tendon strain, right, initial encounter [S86.815H]. Patient presents with signs and symptoms consistent of patellar tendinopathy and has pain during deep squats, kneeling, dancing. Pt presents with  B glut max/med weakness; tenderness of patellar tendon and medial joint line and functional limitations of squatting, dancing, kneeling. Patient would benefit from patellar tendon loading, glute max/med and core strengthening.    Pt to be seen 1x/week for 12 weeks     Patient prognosis is Excellent.   Patientt will benefit from skilled outpatient Physical Therapy to address the deficits stated above and in the chart below, provide patient /family education, and to maximize patientt's level of independence.     Plan of care discussed with patient: Yes  Patient's spiritual, cultural and educational needs considered and patient is agreeable to the plan of care and goals as stated below:     Anticipated Barriers for therapy: High activity level    Medical Necessity is demonstrated by the following  History  Co-morbidities and personal factors that may impact the plan of care Co-morbidities:   Fibroids    Personal Factors:   lifestyle     low   Examination  Body Structures and Functions, activity limitations and participation restrictions that may impact the plan of care Body Regions:   lower extremities    Body Systems:    strength  balance  motor control    Participation Restrictions:   Dance    Activity limitations:   Learning and applying knowledge  no deficits    General Tasks and Commands  no  deficits    Communication  no deficits    Mobility  Deep squats    Self care  no deficits    Domestic Life  no deficits    Interactions/Relationships  no deficits    Life Areas  no deficits    Community and Social Life  recreation and leisure         low   Clinical Presentation stable and uncomplicated low   Decision Making/ Complexity Score: low     Goals:  Short Term Goals: (3 weeks)  1. Pt will be independent with HEP in order to supplement patient in improving functional mobility. - progressing  2. Pt will improve (R ) knee pain to at least 1/10 during deep squats to improve function. - progressing  3. Pt will improve fat pad mobility to WNL in order to improve hip/knee AROM and improved gait function - progressing, not met  4. Pt will improve hip abduction strength from 3-/5 to 3+/5 to improve functional gait deviation. - progressing     Long Term Goals: (6 weeks)  1. Pt will be independent with updated HEP supplement PT in improving functional mobility. - progressing  2. Pt will improve (R) knee pain to at least 0/10 during deep squats. - progressing, not met  3. Pt will improve FOTO knee survey score to </= 37 % limited in order to demo improved functional mobility. - progressing, not met  4. Pt will perform heel tap with no dynamic knee valgus.  5. Pt will return to dance with 0/10 pain.    Plan   Plan of care Certification: 10/4/2022 to 12/4/2022.    Outpatient Physical Therapy 1 times weekly for 12 weeks to include the following interventions: Manual Therapy, Moist Heat/ Ice, Neuromuscular Re-ed, Patient Education, Therapeutic Activities, and Therapeutic Exercise.     Wilbur Rudolph, PT

## 2022-10-13 ENCOUNTER — OFFICE VISIT (OUTPATIENT)
Dept: URGENT CARE | Facility: CLINIC | Age: 42
End: 2022-10-13
Payer: COMMERCIAL

## 2022-10-13 VITALS
WEIGHT: 137 LBS | TEMPERATURE: 99 F | OXYGEN SATURATION: 98 % | SYSTOLIC BLOOD PRESSURE: 123 MMHG | HEART RATE: 58 BPM | DIASTOLIC BLOOD PRESSURE: 83 MMHG | BODY MASS INDEX: 21.5 KG/M2 | HEIGHT: 67 IN | RESPIRATION RATE: 18 BRPM

## 2022-10-13 DIAGNOSIS — R05.9 COUGH, UNSPECIFIED TYPE: Primary | ICD-10-CM

## 2022-10-13 DIAGNOSIS — R09.81 SINUS CONGESTION: ICD-10-CM

## 2022-10-13 DIAGNOSIS — R09.82 POST-NASAL DRIP: ICD-10-CM

## 2022-10-13 PROCEDURE — 3074F PR MOST RECENT SYSTOLIC BLOOD PRESSURE < 130 MM HG: ICD-10-PCS | Mod: CPTII,S$GLB,, | Performed by: NURSE PRACTITIONER

## 2022-10-13 PROCEDURE — 3079F PR MOST RECENT DIASTOLIC BLOOD PRESSURE 80-89 MM HG: ICD-10-PCS | Mod: CPTII,S$GLB,, | Performed by: NURSE PRACTITIONER

## 2022-10-13 PROCEDURE — 3044F PR MOST RECENT HEMOGLOBIN A1C LEVEL <7.0%: ICD-10-PCS | Mod: CPTII,S$GLB,, | Performed by: NURSE PRACTITIONER

## 2022-10-13 PROCEDURE — 3044F HG A1C LEVEL LT 7.0%: CPT | Mod: CPTII,S$GLB,, | Performed by: NURSE PRACTITIONER

## 2022-10-13 PROCEDURE — 1159F MED LIST DOCD IN RCRD: CPT | Mod: CPTII,S$GLB,, | Performed by: NURSE PRACTITIONER

## 2022-10-13 PROCEDURE — 99213 OFFICE O/P EST LOW 20 MIN: CPT | Mod: S$GLB,,, | Performed by: NURSE PRACTITIONER

## 2022-10-13 PROCEDURE — 1160F RVW MEDS BY RX/DR IN RCRD: CPT | Mod: CPTII,S$GLB,, | Performed by: NURSE PRACTITIONER

## 2022-10-13 PROCEDURE — 3074F SYST BP LT 130 MM HG: CPT | Mod: CPTII,S$GLB,, | Performed by: NURSE PRACTITIONER

## 2022-10-13 PROCEDURE — 1160F PR REVIEW ALL MEDS BY PRESCRIBER/CLIN PHARMACIST DOCUMENTED: ICD-10-PCS | Mod: CPTII,S$GLB,, | Performed by: NURSE PRACTITIONER

## 2022-10-13 PROCEDURE — 99213 PR OFFICE/OUTPT VISIT, EST, LEVL III, 20-29 MIN: ICD-10-PCS | Mod: S$GLB,,, | Performed by: NURSE PRACTITIONER

## 2022-10-13 PROCEDURE — 1159F PR MEDICATION LIST DOCUMENTED IN MEDICAL RECORD: ICD-10-PCS | Mod: CPTII,S$GLB,, | Performed by: NURSE PRACTITIONER

## 2022-10-13 PROCEDURE — 3079F DIAST BP 80-89 MM HG: CPT | Mod: CPTII,S$GLB,, | Performed by: NURSE PRACTITIONER

## 2022-10-13 RX ORDER — IPRATROPIUM BROMIDE 42 UG/1
2 SPRAY, METERED NASAL 4 TIMES DAILY
Qty: 15 ML | Refills: 0 | Status: SHIPPED | OUTPATIENT
Start: 2022-10-13

## 2022-10-13 RX ORDER — PROMETHAZINE HYDROCHLORIDE AND DEXTROMETHORPHAN HYDROBROMIDE 6.25; 15 MG/5ML; MG/5ML
5 SYRUP ORAL NIGHTLY PRN
Qty: 118 ML | Refills: 0 | Status: SHIPPED | OUTPATIENT
Start: 2022-10-13 | End: 2022-10-13 | Stop reason: SDUPTHER

## 2022-10-13 RX ORDER — BENZONATATE 200 MG/1
200 CAPSULE ORAL 3 TIMES DAILY PRN
Qty: 30 CAPSULE | Refills: 0 | Status: SHIPPED | OUTPATIENT
Start: 2022-10-13 | End: 2022-10-23

## 2022-10-13 RX ORDER — BENZONATATE 200 MG/1
200 CAPSULE ORAL 3 TIMES DAILY PRN
Qty: 30 CAPSULE | Refills: 0 | Status: SHIPPED | OUTPATIENT
Start: 2022-10-13 | End: 2022-10-13 | Stop reason: SDUPTHER

## 2022-10-13 RX ORDER — ALBUTEROL SULFATE 90 UG/1
2 AEROSOL, METERED RESPIRATORY (INHALATION) EVERY 6 HOURS PRN
Qty: 18 G | Refills: 0 | Status: SHIPPED | OUTPATIENT
Start: 2022-10-13 | End: 2023-10-13

## 2022-10-13 RX ORDER — PROMETHAZINE HYDROCHLORIDE AND DEXTROMETHORPHAN HYDROBROMIDE 6.25; 15 MG/5ML; MG/5ML
5 SYRUP ORAL NIGHTLY PRN
Qty: 118 ML | Refills: 0 | Status: SHIPPED | OUTPATIENT
Start: 2022-10-13

## 2022-10-13 RX ORDER — IPRATROPIUM BROMIDE 42 UG/1
2 SPRAY, METERED NASAL 4 TIMES DAILY
Qty: 15 ML | Refills: 0 | Status: SHIPPED | OUTPATIENT
Start: 2022-10-13 | End: 2022-10-13 | Stop reason: SDUPTHER

## 2022-10-13 RX ORDER — ALBUTEROL SULFATE 90 UG/1
2 AEROSOL, METERED RESPIRATORY (INHALATION) EVERY 6 HOURS PRN
Qty: 18 G | Refills: 0 | Status: SHIPPED | OUTPATIENT
Start: 2022-10-13 | End: 2022-10-13 | Stop reason: SDUPTHER

## 2022-10-13 NOTE — PROGRESS NOTES
"Subjective:       Patient ID: Kathe Hudson is a 42 y.o. female.    Vitals:  height is 5' 7" (1.702 m) and weight is 62.1 kg (137 lb). Her oral temperature is 98.9 °F (37.2 °C). Her blood pressure is 123/83 and her pulse is 58 (abnormal). Her respiration is 18 and oxygen saturation is 98%.     Chief Complaint: Cough and Sinus Problem    Pt states cough with sinus congestion and fatigue x 2 weeks.    Cough  This is a new problem. The current episode started 1 to 4 weeks ago. The problem has been unchanged. The cough is Productive of sputum. Associated symptoms include nasal congestion. Pertinent negatives include no chest pain, chills, ear congestion, ear pain, fever, headaches, heartburn, hemoptysis, myalgias, postnasal drip, rash, rhinorrhea, sore throat, shortness of breath, sweats, weight loss or wheezing. Nothing aggravates the symptoms. Treatments tried: dayquil, nyquil, thera flu, mucinex. The treatment provided mild relief.   Sinus Problem  Associated symptoms include coughing. Pertinent negatives include no chills, ear pain, headaches, shortness of breath or sore throat.     Constitution: Negative for chills and fever.   HENT:  Negative for ear pain, postnasal drip and sore throat.    Cardiovascular:  Negative for chest pain.   Respiratory:  Positive for cough. Negative for bloody sputum, shortness of breath and wheezing.    Gastrointestinal:  Negative for heartburn.   Musculoskeletal:  Negative for muscle ache.   Skin:  Negative for rash.   Neurological:  Negative for headaches.     Objective:      Physical Exam   Constitutional:  Non-toxic appearance. She does not appear ill. No distress.   HENT:   Head: Normocephalic and atraumatic.   Ears:   Right Ear: External ear normal.   Left Ear: External ear normal.   Mouth/Throat: Mucous membranes are moist. Oropharynx is clear.   Pulmonary/Chest: Effort normal and breath sounds normal. No stridor. No respiratory distress. She has no wheezes. She has no " rhonchi. She has no rales. She exhibits no tenderness.         Comments: No acute respiratory distress. Able to speak in full sentences without difficulty or pause    Abdominal: Normal appearance.   Neurological: She is alert.   Skin: Skin is warm and not diaphoretic.   Nursing note and vitals reviewed.      Assessment:       1. Cough, unspecified type    2. Sinus congestion    3. Post-nasal drip          Plan:         Cough, unspecified type  -     promethazine-dextromethorphan (PROMETHAZINE-DM) 6.25-15 mg/5 mL Syrp; Take 5 mLs by mouth nightly as needed (for cough. Do not take with any other cough syrups. Can make you drowsy.).  Dispense: 118 mL; Refill: 0  -     benzonatate (TESSALON) 200 MG capsule; Take 1 capsule (200 mg total) by mouth 3 (three) times daily as needed for Cough.  Dispense: 30 capsule; Refill: 0  -     albuterol (PROVENTIL HFA) 90 mcg/actuation inhaler; Inhale 2 puffs into the lungs every 6 (six) hours as needed for Wheezing. Rescue  Dispense: 18 g; Refill: 0    Sinus congestion  -     ipratropium (ATROVENT) 42 mcg (0.06 %) nasal spray; 2 sprays by Nasal route 4 (four) times daily.  Dispense: 15 mL; Refill: 0    Post-nasal drip    Other orders  -     Discontinue: ipratropium (ATROVENT) 42 mcg (0.06 %) nasal spray; 2 sprays by Nasal route 4 (four) times daily.  Dispense: 15 mL; Refill: 0  -     Discontinue: benzonatate (TESSALON) 200 MG capsule; Take 1 capsule (200 mg total) by mouth 3 (three) times daily as needed for Cough.  Dispense: 30 capsule; Refill: 0  -     Discontinue: albuterol (PROVENTIL HFA) 90 mcg/actuation inhaler; Inhale 2 puffs into the lungs every 6 (six) hours as needed for Wheezing. Rescue  Dispense: 18 g; Refill: 0  -     Discontinue: promethazine-dextromethorphan (PROMETHAZINE-DM) 6.25-15 mg/5 mL Syrp; Take 5 mLs by mouth nightly as needed (for cough. Do not take with any other cough syrups. Can make you drowsy.).  Dispense: 118 mL; Refill: 0            Additional MDM:  "  Differential Diagnosis: The follow diagnoses were considered, but were felt to be of low probability: GERD, Pneumonia and Pneumonitis.      Patient Instructions   Cough   If your condition worsens or fails to improve we recommend that you receive another evaluation at the ER immediately or contact your PCP to discuss your concerns or return here. You must understand that you've received an urgent care treatment only and that you may be released before all your medical problems are known or treated. You the patient will arrange for follouwp care as instructed. .  Rest and fluids are important  Can use honey with medina to soothe your throat  Take prescription cough meds (pills) as prescribed; take prescription cough syrup at night as needed for cough.  Do not take both the prescribed cough pills and syrup at the same time.   -  Flonase (fluticasone) is a nasal spray which is available over the counter and may help with your symptoms.   -  If you have hypertension avoid using the "D" which is the decongestant.  Instead you can use Coricidin HBP for cold and cough symptoms.    -  If you just have drainage you can take plain Zyrtec, Claritin or Allegra   -  Tylenol or ibuprofen can also be used as directed for pain unless you have an allergy to them or medical condition such as stomach ulcers, kidney or liver disease or blood thinners etc for which you should not be taking these type of medications.   Please follow up with your primary care doctor or specialist in the next 48-72hrs as needed and if no improvement  If you  smoke, please stop smoking.      "

## 2022-10-14 NOTE — PATIENT INSTRUCTIONS
"Cough   If your condition worsens or fails to improve we recommend that you receive another evaluation at the ER immediately or contact your PCP to discuss your concerns or return here. You must understand that you've received an urgent care treatment only and that you may be released before all your medical problems are known or treated. You the patient will arrange for follouwp care as instructed. .  Rest and fluids are important  Can use honey with medina to soothe your throat  Take prescription cough meds (pills) as prescribed; take prescription cough syrup at night as needed for cough.  Do not take both the prescribed cough pills and syrup at the same time.   -  Flonase (fluticasone) is a nasal spray which is available over the counter and may help with your symptoms.   -  If you have hypertension avoid using the "D" which is the decongestant.  Instead you can use Coricidin HBP for cold and cough symptoms.    -  If you just have drainage you can take plain Zyrtec, Claritin or Allegra   -  Tylenol or ibuprofen can also be used as directed for pain unless you have an allergy to them or medical condition such as stomach ulcers, kidney or liver disease or blood thinners etc for which you should not be taking these type of medications.   Please follow up with your primary care doctor or specialist in the next 48-72hrs as needed and if no improvement  If you  smoke, please stop smoking.   "

## 2022-10-17 ENCOUNTER — OFFICE VISIT (OUTPATIENT)
Dept: INTERNAL MEDICINE | Facility: CLINIC | Age: 42
End: 2022-10-17
Payer: COMMERCIAL

## 2022-10-17 VITALS
BODY MASS INDEX: 21.63 KG/M2 | SYSTOLIC BLOOD PRESSURE: 114 MMHG | OXYGEN SATURATION: 98 % | WEIGHT: 137.81 LBS | HEIGHT: 67 IN | DIASTOLIC BLOOD PRESSURE: 80 MMHG | HEART RATE: 73 BPM

## 2022-10-17 DIAGNOSIS — R05.8 UPPER AIRWAY COUGH SYNDROME: Primary | ICD-10-CM

## 2022-10-17 PROCEDURE — 3074F SYST BP LT 130 MM HG: CPT | Mod: CPTII,S$GLB,, | Performed by: INTERNAL MEDICINE

## 2022-10-17 PROCEDURE — 1159F MED LIST DOCD IN RCRD: CPT | Mod: CPTII,S$GLB,, | Performed by: INTERNAL MEDICINE

## 2022-10-17 PROCEDURE — 3044F HG A1C LEVEL LT 7.0%: CPT | Mod: CPTII,S$GLB,, | Performed by: INTERNAL MEDICINE

## 2022-10-17 PROCEDURE — 99214 OFFICE O/P EST MOD 30 MIN: CPT | Mod: S$GLB,,, | Performed by: INTERNAL MEDICINE

## 2022-10-17 PROCEDURE — 99214 PR OFFICE/OUTPT VISIT, EST, LEVL IV, 30-39 MIN: ICD-10-PCS | Mod: S$GLB,,, | Performed by: INTERNAL MEDICINE

## 2022-10-17 PROCEDURE — 99999 PR PBB SHADOW E&M-EST. PATIENT-LVL IV: ICD-10-PCS | Mod: PBBFAC,,, | Performed by: INTERNAL MEDICINE

## 2022-10-17 PROCEDURE — 99999 PR PBB SHADOW E&M-EST. PATIENT-LVL IV: CPT | Mod: PBBFAC,,, | Performed by: INTERNAL MEDICINE

## 2022-10-17 PROCEDURE — 3074F PR MOST RECENT SYSTOLIC BLOOD PRESSURE < 130 MM HG: ICD-10-PCS | Mod: CPTII,S$GLB,, | Performed by: INTERNAL MEDICINE

## 2022-10-17 PROCEDURE — 3079F PR MOST RECENT DIASTOLIC BLOOD PRESSURE 80-89 MM HG: ICD-10-PCS | Mod: CPTII,S$GLB,, | Performed by: INTERNAL MEDICINE

## 2022-10-17 PROCEDURE — 1159F PR MEDICATION LIST DOCUMENTED IN MEDICAL RECORD: ICD-10-PCS | Mod: CPTII,S$GLB,, | Performed by: INTERNAL MEDICINE

## 2022-10-17 PROCEDURE — 1160F RVW MEDS BY RX/DR IN RCRD: CPT | Mod: CPTII,S$GLB,, | Performed by: INTERNAL MEDICINE

## 2022-10-17 PROCEDURE — 3079F DIAST BP 80-89 MM HG: CPT | Mod: CPTII,S$GLB,, | Performed by: INTERNAL MEDICINE

## 2022-10-17 PROCEDURE — 3044F PR MOST RECENT HEMOGLOBIN A1C LEVEL <7.0%: ICD-10-PCS | Mod: CPTII,S$GLB,, | Performed by: INTERNAL MEDICINE

## 2022-10-17 PROCEDURE — 1160F PR REVIEW ALL MEDS BY PRESCRIBER/CLIN PHARMACIST DOCUMENTED: ICD-10-PCS | Mod: CPTII,S$GLB,, | Performed by: INTERNAL MEDICINE

## 2022-10-17 RX ORDER — CETIRIZINE HYDROCHLORIDE 10 MG/1
10 TABLET ORAL DAILY
Qty: 30 TABLET | Refills: 11 | Status: SHIPPED | OUTPATIENT
Start: 2022-10-17 | End: 2023-10-17

## 2022-10-17 RX ORDER — BIMATOPROST 0.3 MG/ML
SOLUTION/ DROPS OPHTHALMIC
COMMUNITY
Start: 2022-10-10

## 2022-10-17 RX ORDER — FLUTICASONE PROPIONATE 50 MCG
1 SPRAY, SUSPENSION (ML) NASAL DAILY
Qty: 16 G | Refills: 0 | Status: SHIPPED | OUTPATIENT
Start: 2022-10-17

## 2022-10-17 NOTE — PROGRESS NOTES
"    CHIEF COMPLAINT     Chief Complaint   Patient presents with    Follow-up     No appetite     Fatigue    Cough     Cough x5 weeks  Yellow mucous x2weeks       HPI     Kathe Hudson is a 42 y.o. female here today for     Cough x5 weeks. Increased congestion and drainage x 2.  Reports hx of seasonal allergies.  Previously on reflux medication but stopped    + congestion, post nasal drip, throat clearing  -Itchy eyes, itchy     Has dayquil, nyquil, thera flu, mucinex, sudafed, tessalon    Personally Reviewed Patient's Medical, surgical, family and social hx. Changes updated in [facility].  Care Team updated in Epic    Review of Systems:  Review of Systems   Constitutional:  Positive for fatigue.   HENT:  Positive for congestion, postnasal drip, sinus pressure, sneezing and sore throat. Negative for sinus pain.    Respiratory:  Positive for choking.      Health Maintenance:   Reviewed with patient  Due for the following:      PHYSICAL EXAM     /80 (BP Location: Right arm, Patient Position: Sitting, BP Method: Medium (Manual))   Pulse 73   Ht 5' 7" (1.702 m)   Wt 62.5 kg (137 lb 12.6 oz)   LMP 09/29/2022   SpO2 98%   BMI 21.58 kg/m²     Gen: Well Appearing, NAD  HEENT: PERR, EOMI, enlarged nasal turbinate BL, mucus river posterior pharynx  Neck: FROM, no thyromegaly, singular submental adenopathy right size  Pulm: Normal work of breathing, CTAB, no wheezing  Abd:  Soft, NT, ND non TTP, no mass  MSK: no LE edema  Neuro: A&Ox3, gait normal, speech normal  Mood; Mood normal, behavior normal, thought process linear       LABS     Labs reviewed; Notable for    ASSESSMENT     1. Upper airway cough syndrome  cetirizine (ZYRTEC) 10 MG tablet    fluticasone propionate (FLONASE) 50 mcg/actuation nasal spray              Plan     Kathe Hudson is a 42 y.o. female with  1. Upper airway cough syndrome  - cetirizine (ZYRTEC) 10 MG tablet; Take 1 tablet (10 mg total) by mouth once daily.  Dispense: 30 tablet; " Refill: 11  - fluticasone propionate (FLONASE) 50 mcg/actuation nasal spray; 1 spray (50 mcg total) by Each Nostril route once daily.  Dispense: 16 g; Refill: 0   Clinical picture concerning for postnasal drip  .  Recommend allergic treatment with antihistamine intranasal steroid.  Can use short-term intranasal decongestant as well as hypertonic saline sinus rinse.      Lungs clear on exam so did not feel like chest x-ray was warranted today.      Patient was concern for infection, however patient does not look acutely ill today.  Told her reach out to me by end of the week if she is not feeling better and we can re-evaluate.      Dk Vergara MD

## 2022-10-17 NOTE — PATIENT INSTRUCTIONS
Things to use  Zyrtec 10mg 1 tablet daily  Floanse nasal spray 2 sprays each side twice day  Hypertonic saline sinus rinse

## 2022-10-20 ENCOUNTER — PATIENT MESSAGE (OUTPATIENT)
Dept: INTERNAL MEDICINE | Facility: CLINIC | Age: 42
End: 2022-10-20
Payer: COMMERCIAL

## 2022-10-20 DIAGNOSIS — J01.00 ACUTE NON-RECURRENT MAXILLARY SINUSITIS: Primary | ICD-10-CM

## 2022-10-20 RX ORDER — AMOXICILLIN AND CLAVULANATE POTASSIUM 875; 125 MG/1; MG/1
1 TABLET, FILM COATED ORAL EVERY 12 HOURS
Qty: 10 TABLET | Refills: 0 | Status: SHIPPED | OUTPATIENT
Start: 2022-10-20 | End: 2022-10-25

## 2022-10-20 NOTE — TELEPHONE ENCOUNTER
Will send in augmentin since worsening of symptoms have been going on for 2 weeks.  Please continue measures discussed from our visit, in the event this is non bacterial infection that is just taking longer to clear up.    Dk Vergara

## 2022-11-03 ENCOUNTER — CLINICAL SUPPORT (OUTPATIENT)
Dept: REHABILITATION | Facility: HOSPITAL | Age: 42
End: 2022-11-03
Payer: COMMERCIAL

## 2022-11-03 DIAGNOSIS — M25.561 KNEE PAIN, RIGHT ANTERIOR: Primary | ICD-10-CM

## 2022-11-03 DIAGNOSIS — M67.969 TENDINOPATHY OF PATELLA: ICD-10-CM

## 2022-11-03 PROCEDURE — 97140 MANUAL THERAPY 1/> REGIONS: CPT

## 2022-11-03 PROCEDURE — 97110 THERAPEUTIC EXERCISES: CPT

## 2022-11-03 PROCEDURE — 97112 NEUROMUSCULAR REEDUCATION: CPT

## 2022-11-03 NOTE — PROGRESS NOTES
[facility]   Physical Therapy Treatment Note     Name: Kathe Chamorro [facility]  Clinic Number: [id number]    Therapy Diagnosis:   Encounter Diagnoses   Name Primary?    Knee pain, right anterior Yes    Tendinopathy of patella      Physician: Bob Denton IV, MD    Visit Date: 11/3/2022    Physician Orders: PT Eval and Treat Knee pain, tendinopathy of patella  Medical Diagnosis from Referral: Patellar tendon strain, right, initial encounter [S86.811A]  Evaluation Date: 10/4/2022  Authorization Period Expiration: 9/14/2023  Plan of Care Expiration: 12/4/2022  Visit # / Visits authorized: 1/ 20     Time In: 6:00 pm  Time Out: 7:00 pm  Total Appointment Time (timed & untimed codes): 60 minutes       FOTO 1st: 24%  FOTO 3rd:  FOTO 10th:      SUBJECTIVE     Pt reports: That she was unable to attend sessions since eval due to being sick. Pt reports that the knee pain is still bothering her with squatting and dancing activities.    She was compliant with home exercise program.  Response to previous treatment: Too soon  Functional change: Too soon    Pain: 8/10 at start, 2/10 at end of session  Location: right knee      OBJECTIVE     Objective Measures updated at progress report unless specified.     Treatment       Kathe received the treatments listed below:      Therapeutic exercises to develop strength, endurance, ROM, flexibility, and core stabilization for 24 minutes including:    Bike for 8' to improve cardiovascular endurance and tissue extensibility  Patellar tendon loading isometrics 5 x 45s 2 min rests     Manual therapy techniques: Joint mobilizations, Myofacial release, and Soft tissue Mobilization were applied to the: R knee for 11 minutes, including:  Patellar mobs multidirectional grades III/IV  Infrapatellar mobs grades III/IV  Education/demonstration on self mobs.    Therapeutic activities to improve functional performance for 00  minutes, including:      Neuromuscular  "re-education activities to improve: Balance, Coordination, Kinesthetic, Sense, and Proprioception for 25 minutes. The following activities were included:    Lateral band walks BTB 2 laps (pt educated on preventing dynamic knee valgus)  Monster walks BTB 2 laps (pt educated on preventing dynamic knee valgus)  Heel taps 2x10 / side 3" box (4" box attempted but pt showed apprehension at that height)  Pt educated on equal WB through feet and controlling dynamic knee valgus during heel tap. Pt educated on preventing hip drop during heel tap.        Patient Education and Home Exercises     Home Exercises Provided and Patient Education Provided     Education provided:   - HEP  -See above    Written Home Exercises Provided: yes. Exercises were reviewed and Kathe was able to demonstrate them prior to the end of the session.  Kathe demonstrated good  understanding of the education provided. See EMR under Patient Instructions for exercises provided during therapy sessions    ASSESSMENT     Pt demonstrated improvement in knee pain from tendon loading and was able to tolerate WB during heel taps. Towards end of 2nd set pt reported tightness around the knee beginning so a third set was not attempted. Pt verbalized an improvement in sx from mobilizations at end of session.    Kathe Is progressing well towards her goals.     Pt prognosis is Excellent.     Pt will continue to benefit from skilled outpatient physical therapy to address the deficits listed in the problem list box on initial evaluation, provide pt/family education and to maximize pt's level of independence in the home and community environment.     Pt's spiritual, cultural and educational needs considered and pt agreeable to plan of care and goals.     Anticipated barriers to physical therapy: High activity level    Goals:   Short Term Goals: (3 weeks)  1. Pt will be independent with HEP in order to supplement patient in improving functional mobility. - " progressing  2. Pt will improve (R ) knee pain to at least 1/10 during deep squats to improve function. - progressing  3. Pt will improve fat pad mobility to WNL in order to improve hip/knee AROM and improved gait function - progressing, not met  4. Pt will improve hip abduction strength from 3-/5 to 3+/5 to improve functional gait deviation. - progressing     Long Term Goals: (6 weeks)  1. Pt will be independent with updated HEP supplement PT in improving functional mobility. - progressing  2. Pt will improve (R) knee pain to at least 0/10 during deep squats. - progressing, not met  3. Pt will improve FOTO knee survey score to </= 37 % limited in order to demo improved functional mobility. - progressing, not met  4. Pt will perform heel tap with no dynamic knee valgus.  5. Pt will return to dance with 0/10 pain.    PLAN     Plan of care Certification: 10/4/2022 to 12/4/2022.     Outpatient Physical Therapy 1 times weekly for 12 weeks to include the following interventions: Manual Therapy, Moist Heat/ Ice, Neuromuscular Re-ed, Patient Education, Therapeutic Activities, and Therapeutic Exercise.     Wilbur Rudolph, PT PT, DPT

## 2022-11-08 ENCOUNTER — CLINICAL SUPPORT (OUTPATIENT)
Dept: REHABILITATION | Facility: HOSPITAL | Age: 42
End: 2022-11-08
Payer: COMMERCIAL

## 2022-11-08 DIAGNOSIS — M67.969 TENDINOPATHY OF PATELLA: ICD-10-CM

## 2022-11-08 DIAGNOSIS — M25.561 KNEE PAIN, RIGHT ANTERIOR: Primary | ICD-10-CM

## 2022-11-08 PROCEDURE — 97112 NEUROMUSCULAR REEDUCATION: CPT

## 2022-11-08 PROCEDURE — 97140 MANUAL THERAPY 1/> REGIONS: CPT

## 2022-11-08 PROCEDURE — 97110 THERAPEUTIC EXERCISES: CPT

## 2022-11-08 NOTE — PROGRESS NOTES
[facility]   Physical Therapy Treatment Note     Name: Kathe Chamorro [facility]  Clinic Number: [id number]    Therapy Diagnosis:   Encounter Diagnoses   Name Primary?    Knee pain, right anterior Yes    Tendinopathy of patella        Physician: Bob Denton IV, MD    Visit Date: 11/8/2022    Physician Orders: PT Eval and Treat Knee pain, tendinopathy of patella  Medical Diagnosis from Referral: Patellar tendon strain, right, initial encounter [S86.811A]  Evaluation Date: 10/4/2022  Authorization Period Expiration: 9/14/2023  Plan of Care Expiration: 12/4/2022  Visit # / Visits authorized: 1/ 20     Time In: 1:00 pm  Time Out: 2:03 pm  Total Appointment Time (timed & untimed codes): 63 minutes       FOTO 1st: 24%  FOTO 3rd:  FOTO 10th:      SUBJECTIVE     Pt reports: That the tendon loading seems to be going well and helping with her sx. Pt reports continued challenges with balancing on 1 leg. Pt reports that she has been falling asleep with ice on her knee with increased knee stiffness in the morning.    She was compliant with home exercise program.  Response to previous treatment: Too soon  Functional change: Too soon    Pain: 4/10 at start, 2/10 at end of session  Location: right knee      OBJECTIVE     Objective Measures updated at progress report unless specified.     Treatment       Kathe received the treatments listed below:      Therapeutic exercises to develop strength, endurance, ROM, flexibility, and core stabilization for 23 minutes including:    Bike for 6' to improve cardiovascular endurance and tissue extensibility    Patellar tendon loading isometrics 5 x 45s 2 min rests     Manual therapy techniques: Joint mobilizations, Myofacial release, and Soft tissue Mobilization were applied to the: R knee for 11 minutes, including:  Patellar mobs multidirectional grades III/IV  Infrapatellar mobs grades III/IV  Education/demonstration on self mobs.    Therapeutic activities to  "improve functional performance for 00  minutes, including:      Neuromuscular re-education activities to improve: Balance, Coordination, Kinesthetic, Sense, and Proprioception for 29 minutes. The following activities were included:    -[SB bridge > HS curl 3 x 10   Lateral band walks BTB 2 laps (pt educated on preventing dynamic knee valgus)  Monster walks BTB 2 laps (pt educated on preventing dynamic knee valgus)]-circuit    Heel taps 2x10 > 1x10 10# / side 3" box (4" box attempted with no pt apprehension but in ability to prevent hip adduction w/ MR at end range with increase of sx end range. No sx and good control at end range with 3") (pt educated to squeeze quad and get TKE at top)         Patient Education and Home Exercises     Home Exercises Provided and Patient Education Provided     Education provided:   - HEP  -See above    Written Home Exercises Provided: yes. Exercises were reviewed and Kathe was able to demonstrate them prior to the end of the session.  Kathe demonstrated good  understanding of the education provided. See EMR under Patient Instructions for exercises provided during therapy sessions    ASSESSMENT     Pt demonstrated improvement in knee symptoms from previous tx with a positive response to tendon loading and infrapatellar fat pad mobs. Pt able to tolerate an extra set of heel taps with the introduction of weight before onset of sx. Pt able to demonstrate improvements in dynamic knee valgus and medial rotation with min verbal, tactile and visual cues.    Kathe Is progressing well towards her goals.     Pt prognosis is Excellent.     Pt will continue to benefit from skilled outpatient physical therapy to address the deficits listed in the problem list box on initial evaluation, provide pt/family education and to maximize pt's level of independence in the home and community environment.     Pt's spiritual, cultural and educational needs considered and pt agreeable to plan of care and " goals.     Anticipated barriers to physical therapy: High activity level    Goals:   Short Term Goals: (3 weeks)  1. Pt will be independent with HEP in order to supplement patient in improving functional mobility. - progressing  2. Pt will improve (R ) knee pain to at least 1/10 during deep squats to improve function. - progressing  3. Pt will improve fat pad mobility to WNL in order to improve hip/knee AROM and improved gait function - progressing, not met  4. Pt will improve hip abduction strength from 3-/5 to 3+/5 to improve functional gait deviation. - progressing     Long Term Goals: (6 weeks)  1. Pt will be independent with updated HEP supplement PT in improving functional mobility. - progressing  2. Pt will improve (R) knee pain to at least 0/10 during deep squats. - progressing, not met  3. Pt will improve FOTO knee survey score to </= 37 % limited in order to demo improved functional mobility. - progressing, not met  4. Pt will perform heel tap with no dynamic knee valgus.  5. Pt will return to dance with 0/10 pain.    PLAN     Plan of care Certification: 10/4/2022 to 12/4/2022.     Outpatient Physical Therapy 1 times weekly for 12 weeks to include the following interventions: Manual Therapy, Moist Heat/ Ice, Neuromuscular Re-ed, Patient Education, Therapeutic Activities, and Therapeutic Exercise.     Wilbur Rudolph, PT PT, DPT

## 2022-11-15 ENCOUNTER — CLINICAL SUPPORT (OUTPATIENT)
Dept: OTHER | Facility: CLINIC | Age: 42
End: 2022-11-15
Payer: COMMERCIAL

## 2022-11-15 DIAGNOSIS — Z00.8 ENCOUNTER FOR OTHER GENERAL EXAMINATION: ICD-10-CM

## 2022-11-16 ENCOUNTER — CLINICAL SUPPORT (OUTPATIENT)
Dept: REHABILITATION | Facility: HOSPITAL | Age: 42
End: 2022-11-16
Payer: COMMERCIAL

## 2022-11-16 VITALS
HEIGHT: 67 IN | DIASTOLIC BLOOD PRESSURE: 69 MMHG | WEIGHT: 140 LBS | SYSTOLIC BLOOD PRESSURE: 111 MMHG | BODY MASS INDEX: 21.97 KG/M2

## 2022-11-16 DIAGNOSIS — M67.969 TENDINOPATHY OF PATELLA: ICD-10-CM

## 2022-11-16 DIAGNOSIS — M25.561 KNEE PAIN, RIGHT ANTERIOR: Primary | ICD-10-CM

## 2022-11-16 LAB
HDLC SERPL-MCNC: 57 MG/DL
POC CHOLESTEROL, TOTAL: 139 MG/DL
POC GLUCOSE, FASTING: 77 MG/DL (ref 60–110)
TRIGL SERPL-MCNC: 44 MG/DL

## 2022-11-16 PROCEDURE — 97112 NEUROMUSCULAR REEDUCATION: CPT

## 2022-11-16 PROCEDURE — 97110 THERAPEUTIC EXERCISES: CPT

## 2022-11-17 NOTE — PROGRESS NOTES
[facility]   Physical Therapy Treatment Note     Name: Kathe Chamorro [facility]  Clinic Number: [id number]    Therapy Diagnosis:   Encounter Diagnoses   Name Primary?    Knee pain, right anterior Yes    Tendinopathy of patella        Physician: Bob Denton IV, MD    Visit Date: 11/16/2022    Physician Orders: PT Eval and Treat Knee pain, tendinopathy of patella  Medical Diagnosis from Referral: Patellar tendon strain, right, initial encounter [S86.811A]  Evaluation Date: 10/4/2022  Authorization Period Expiration: 9/14/2023  Plan of Care Expiration: 12/4/2022  Visit # / Visits authorized: 1/ 20     Time In: 6:00 pm  Time Out: 7:05 pm  Total Appointment Time (timed & untimed codes): 65 minutes       FOTO 1st: 24%  FOTO 3rd:  FOTO 10th:      SUBJECTIVE     Pt reports: That her knee pain is improving and is only in a little bit of pain today. Pt reports she is now able to tolerate her workouts better without an increase in symptoms.    She was compliant with home exercise program.  Response to previous treatment: Too soon  Functional change: Too soon    Pain: 2/10 at start  Location: right knee      OBJECTIVE     Objective Measures updated at progress report unless specified.     Treatment       Kathe received the treatments listed below:      Therapeutic exercises to develop strength, endurance, ROM, flexibility, and core stabilization for 34 minutes including:    Bike for 6' to improve cardiovascular endurance and tissue extensibility    Patellar tendon loading isometrics 5 x 45s 2 min rests (pt able to tolerate max intensity with no increase in sx)    -[SB bridge > HS curl 3 x 10 (pt educated on hip hinge)  Lateral band walks BTB 2 laps (pt educated on preventing dynamic knee valgus)  Monster walks BTB 2 laps (pt educated on preventing dynamic knee valgus)]-circuit    Manual therapy techniques: Joint mobilizations, Myofacial release, and Soft tissue Mobilization were applied to the: R  "knee for 00 minutes, including:  Patellar mobs multidirectional grades III/IV  Infrapatellar mobs grades III/IV  Education/demonstration on self mobs.    Therapeutic activities to improve functional performance for 00  minutes, including:      Neuromuscular re-education activities to improve: Balance, Coordination, Kinesthetic, Sense, and Proprioception for 31 minutes. The following activities were included:        SL Shuttle MVP 1.5 c 3 x 10 / side (pt educated on even WB through tripod of foot, achieving TKE and a visual quad contraction w/o hyperext)    SL squats 10# on 26" box 4 x 10 / side with opposite heel on ground for support (pt educated to prevent excessive forward lean to load quad more and education reinforced on controlling dynamic knee valgus and WB through tripod of foot)         Patient Education and Home Exercises     Home Exercises Provided and Patient Education Provided     Education provided:   - HEP  -See above    Written Home Exercises Provided: yes. Exercises were reviewed and Kathe was able to demonstrate them prior to the end of the session.  Kathe demonstrated good  understanding of the education provided. See EMR under Patient Instructions for exercises provided during therapy sessions    ASSESSMENT     Pt demonstrating continue improvement to loading the knee and patellar tendon with improved symptoms. Pt demonstrating an improved ability load the quad over the posterior chain and prevent dynamic knee valgus during SL activities.    Kathe Is progressing well towards her goals.     Pt prognosis is Excellent.     Pt will continue to benefit from skilled outpatient physical therapy to address the deficits listed in the problem list box on initial evaluation, provide pt/family education and to maximize pt's level of independence in the home and community environment.     Pt's spiritual, cultural and educational needs considered and pt agreeable to plan of care and goals.     Anticipated " barriers to physical therapy: High activity level    Goals:   Short Term Goals: (3 weeks)  1. Pt will be independent with HEP in order to supplement patient in improving functional mobility. - progressing  2. Pt will improve (R ) knee pain to at least 1/10 during deep squats to improve function. - progressing  3. Pt will improve fat pad mobility to WNL in order to improve hip/knee AROM and improved gait function - progressing, not met  4. Pt will improve hip abduction strength from 3-/5 to 3+/5 to improve functional gait deviation. - progressing     Long Term Goals: (6 weeks)  1. Pt will be independent with updated HEP supplement PT in improving functional mobility. - progressing  2. Pt will improve (R) knee pain to at least 0/10 during deep squats. - progressing, not met  3. Pt will improve FOTO knee survey score to </= 37 % limited in order to demo improved functional mobility. - progressing, not met  4. Pt will perform heel tap with no dynamic knee valgus.  5. Pt will return to dance with 0/10 pain.    PLAN     Plan of care Certification: 10/4/2022 to 12/4/2022.     Outpatient Physical Therapy 1 times weekly for 12 weeks to include the following interventions: Manual Therapy, Moist Heat/ Ice, Neuromuscular Re-ed, Patient Education, Therapeutic Activities, and Therapeutic Exercise.     Wilbur Rudolph, PT PT, DPT

## 2022-11-21 ENCOUNTER — CLINICAL SUPPORT (OUTPATIENT)
Dept: REHABILITATION | Facility: HOSPITAL | Age: 42
End: 2022-11-21
Payer: COMMERCIAL

## 2022-11-21 DIAGNOSIS — M25.561 KNEE PAIN, RIGHT ANTERIOR: Primary | ICD-10-CM

## 2022-11-21 DIAGNOSIS — M67.969 TENDINOPATHY OF PATELLA: ICD-10-CM

## 2022-11-21 PROCEDURE — 97112 NEUROMUSCULAR REEDUCATION: CPT

## 2022-11-21 PROCEDURE — 97110 THERAPEUTIC EXERCISES: CPT

## 2022-11-21 NOTE — PROGRESS NOTES
[facility]   Physical Therapy Treatment Note     Name: Kathe Chamorro [facility]  Clinic Number: [id number]    Therapy Diagnosis:   Encounter Diagnoses   Name Primary?    Knee pain, right anterior Yes    Tendinopathy of patella        Physician: Bob Denton IV, MD    Visit Date: 11/21/2022    Physician Orders: PT Eval and Treat Knee pain, tendinopathy of patella  Medical Diagnosis from Referral: Patellar tendon strain, right, initial encounter [S86.811A]  Evaluation Date: 10/4/2022  Authorization Period Expiration: 9/14/2023  Plan of Care Expiration: 12/4/2022  Visit # / Visits authorized: 1/ 20     Time In: 10:00 am  Time Out: 11:05 am  Total Appointment Time (timed & untimed codes): 65 minutes       FOTO 1st: 24%  FOTO 3rd:  FOTO 10th:      SUBJECTIVE     Pt reports: That her knee pain is continuing to improve and only feels a little bit of discomfort with higher volumes of activity    She was compliant with home exercise program.  Response to previous treatment: Improved patellar tendon tolerance to loading  Functional change: Improved ability to prevent dynamic knee valgus    Pain: 1/10 at start  Location: right knee      OBJECTIVE     Objective Measures updated at progress report unless specified.     Treatment       Kathe received the treatments listed below:      Therapeutic exercises to develop strength, endurance, ROM, flexibility, and core stabilization for 31 minutes including:    Bike for 6' to improve cardiovascular endurance and tissue extensibility    Patellar tendon loading isometrics 5 x 45s 2 min rests (pt able to tolerate max intensity with no increase in sx)    -[SB bridge > HS curl 2 x 10 (pt educated on hip hinge)  Lateral band walks BTB 2 laps (pt educated on preventing dynamic knee valgus)  Monster walks BTB 2 laps (pt educated on preventing dynamic knee valgus)]-circuit    Manual therapy techniques: Joint mobilizations, Myofacial release, and Soft tissue  "Mobilization were applied to the: R knee for 00 minutes, including:  Patellar mobs multidirectional grades III/IV  Infrapatellar mobs grades III/IV  Education/demonstration on self mobs.    Therapeutic activities to improve functional performance for 00  minutes, including:      Neuromuscular re-education activities to improve: Balance, Coordination, Kinesthetic, Sense, and Proprioception for 34 minutes. The following activities were included:      -[Squats to 20" box 10 # 3 x 10 (education on preventing excessive forward lean)  Deadlifts 10# 3 x 10 (education on open book position at the bottom and driving through the feet not lifting with the lower back) ]- circuit    SL squats 10# on 26" box 4 x 10 / side with opposite heel on ground for support    (education on preventing excessive forward lean)      Patient Education and Home Exercises     Home Exercises Provided and Patient Education Provided     Education provided:   - HEP  -See above    Written Home Exercises Provided: yes. Exercises were reviewed and Kathe was able to demonstrate them prior to the end of the session.  Kathe demonstrated good  understanding of the education provided. See EMR under Patient Instructions for exercises provided during therapy sessions    ASSESSMENT     Pt demonstrating continue improvement to loading the knee and patellar tendon with improved symptoms. Pt demonstrating an improved ability load the quad during squatting activities without excessive recruitment of the posterior chain and also able to prevent dynamic knee valgus during SL activities.    Kathe Is progressing well towards her goals.     Pt prognosis is Excellent.     Pt will continue to benefit from skilled outpatient physical therapy to address the deficits listed in the problem list box on initial evaluation, provide pt/family education and to maximize pt's level of independence in the home and community environment.     Pt's spiritual, cultural and educational " needs considered and pt agreeable to plan of care and goals.     Anticipated barriers to physical therapy: High activity level    Goals:   Short Term Goals: (3 weeks)  1. Pt will be independent with HEP in order to supplement patient in improving functional mobility. - progressing  2. Pt will improve (R ) knee pain to at least 1/10 during deep squats to improve function. - progressing  3. Pt will improve fat pad mobility to WNL in order to improve hip/knee AROM and improved gait function - progressing, not met  4. Pt will improve hip abduction strength from 3-/5 to 3+/5 to improve functional gait deviation. - progressing     Long Term Goals: (6 weeks)  1. Pt will be independent with updated HEP supplement PT in improving functional mobility. - progressing  2. Pt will improve (R) knee pain to at least 0/10 during deep squats. - progressing, not met  3. Pt will improve FOTO knee survey score to </= 37 % limited in order to demo improved functional mobility. - progressing, not met  4. Pt will perform heel tap with no dynamic knee valgus.  5. Pt will return to dance with 0/10 pain.    PLAN     Plan of care Certification: 10/4/2022 to 12/4/2022.     Outpatient Physical Therapy 1 times weekly for 12 weeks to include the following interventions: Manual Therapy, Moist Heat/ Ice, Neuromuscular Re-ed, Patient Education, Therapeutic Activities, and Therapeutic Exercise.     Wilbur Rudolph, PT PT, DPT

## 2022-12-05 ENCOUNTER — CLINICAL SUPPORT (OUTPATIENT)
Dept: REHABILITATION | Facility: HOSPITAL | Age: 42
End: 2022-12-05
Payer: COMMERCIAL

## 2022-12-05 DIAGNOSIS — M25.561 KNEE PAIN, RIGHT ANTERIOR: Primary | ICD-10-CM

## 2022-12-05 DIAGNOSIS — M67.969 TENDINOPATHY OF PATELLA: ICD-10-CM

## 2022-12-05 PROCEDURE — 97110 THERAPEUTIC EXERCISES: CPT

## 2022-12-05 NOTE — PROGRESS NOTES
[facility]   Physical Therapy Treatment Note     Name: Kathe Chamorro [facility]  Clinic Number: [id number]    Therapy Diagnosis:   Encounter Diagnoses   Name Primary?    Knee pain, right anterior Yes    Tendinopathy of patella          Physician: Bob Denton IV, MD    Visit Date: 12/5/2022    Physician Orders: PT Eval and Treat Knee pain, tendinopathy of patella  Medical Diagnosis from Referral: Patellar tendon strain, right, initial encounter [S86.811A]  Evaluation Date: 10/4/2022  Authorization Period Expiration: 9/14/2023  Plan of Care Expiration: 12/4/2022  Visit # / Visits authorized: 5/ 20     Time In: 500pm  Time Out: 535pm  Total Appointment Time (timed & untimed codes): 35 minutes       FOTO 1st: 24%  FOTO 3rd:  FOTO 10th:      SUBJECTIVE     Pt reports: She worked out less last week and had more pain.She also wore heels on Sunday that may have contributed to the pain.    She was compliant with home exercise program.  Response to previous treatment: Improved patellar tendon tolerance to loading  Functional change: Improved ability to prevent dynamic knee valgus    Pain: 1/10 at start  Location: right knee      OBJECTIVE     Objective Measures updated at progress report unless specified.     Treatment       Kathe received the treatments listed below:      Therapeutic exercises to develop strength, endurance, ROM, flexibility, and core stabilization for 35 minutes including:    Bike for 6' to improve cardiovascular endurance and tissue extensibility    Patellar tendon loading isometrics 5 x 45s 2 min rests (pt able to tolerate max intensity with no increase in sx)    -[SB bridge > HS curl 2 x 10 (pt educated on hip hinge)  Lateral band walks BTB 2 laps (pt educated on preventing dynamic knee valgus)  Monster walks BTB 2 laps (pt educated on preventing dynamic knee valgus)]-circuit    Manual therapy techniques: Joint mobilizations, Myofacial release, and Soft tissue Mobilization  "were applied to the: R knee for 00 minutes, including:  Patellar mobs multidirectional grades III/IV  Infrapatellar mobs grades III/IV  Education/demonstration on self mobs.    Therapeutic activities to improve functional performance for 00  minutes, including:      Neuromuscular re-education activities to improve: Balance, Coordination, Kinesthetic, Sense, and Proprioception for 0 minutes. The following activities were included:      -[Squats to 20" box 10 # 3 x 10 (education on preventing excessive forward lean)  Deadlifts 10# 3 x 10 (education on open book position at the bottom and driving through the feet not lifting with the lower back) ]- circuit    SL squats 10# on 26" box 4 x 10 / side with opposite heel on ground for support    (education on preventing excessive forward lean)      Patient Education and Home Exercises     Home Exercises Provided and Patient Education Provided     Education provided:   - HEP  -See above    Written Home Exercises Provided: yes. Exercises were reviewed and Kathe was able to demonstrate them prior to the end of the session.  Kathe demonstrated good  understanding of the education provided. See EMR under Patient Instructions for exercises provided during therapy sessions    ASSESSMENT     Patient tolerated treatment well that was cut short since she had to leave early. No pain with patellar tendon loading. Good knee control with single leg squats- no support needed from other Lower Extremity.     Kathe Is progressing well towards her goals.     Pt prognosis is Excellent.     Pt will continue to benefit from skilled outpatient physical therapy to address the deficits listed in the problem list box on initial evaluation, provide pt/family education and to maximize pt's level of independence in the home and community environment.     Pt's spiritual, cultural and educational needs considered and pt agreeable to plan of care and goals.     Anticipated barriers to physical therapy: " High activity level    Goals:   Short Term Goals: (3 weeks)  1. Pt will be independent with HEP in order to supplement patient in improving functional mobility. - progressing  2. Pt will improve (R ) knee pain to at least 1/10 during deep squats to improve function. - progressing  3. Pt will improve fat pad mobility to WNL in order to improve hip/knee AROM and improved gait function - progressing, not met  4. Pt will improve hip abduction strength from 3-/5 to 3+/5 to improve functional gait deviation. - progressing     Long Term Goals: (6 weeks)  1. Pt will be independent with updated HEP supplement PT in improving functional mobility. - progressing  2. Pt will improve (R) knee pain to at least 0/10 during deep squats. - progressing, not met  3. Pt will improve FOTO knee survey score to </= 37 % limited in order to demo improved functional mobility. - progressing, not met  4. Pt will perform heel tap with no dynamic knee valgus.  5. Pt will return to dance with 0/10 pain.    PLAN     Plan of care Certification: 10/4/2022 to 12/4/2022.     Outpatient Physical Therapy 1 times weekly for 12 weeks to include the following interventions: Manual Therapy, Moist Heat/ Ice, Neuromuscular Re-ed, Patient Education, Therapeutic Activities, and Therapeutic Exercise.     Bekah Gray, PT , DPT

## 2022-12-12 ENCOUNTER — TELEPHONE (OUTPATIENT)
Dept: BEHAVIORAL HEALTH | Facility: CLINIC | Age: 42
End: 2022-12-12
Payer: COMMERCIAL

## 2022-12-12 NOTE — PROGRESS NOTES
Pt called [facility] looking to schedule with Delia Carias LCSW no referral was placed pt states she got the information of [employer]. Pt states she has seen  before and will reach out to him for a referral to [facility]. CHW will follow up with pt once that is done

## 2022-12-15 ENCOUNTER — CLINICAL SUPPORT (OUTPATIENT)
Dept: REHABILITATION | Facility: HOSPITAL | Age: 42
End: 2022-12-15
Payer: COMMERCIAL

## 2022-12-15 DIAGNOSIS — M67.969 TENDINOPATHY OF PATELLA: ICD-10-CM

## 2022-12-15 DIAGNOSIS — M25.561 KNEE PAIN, RIGHT ANTERIOR: Primary | ICD-10-CM

## 2022-12-15 PROCEDURE — 97112 NEUROMUSCULAR REEDUCATION: CPT

## 2022-12-15 PROCEDURE — 97110 THERAPEUTIC EXERCISES: CPT

## 2022-12-15 NOTE — PROGRESS NOTES
[facility]   Physical Therapy Treatment Note     Name: Kathe Chamorro [facility]  Clinic Number: [id number]    Therapy Diagnosis:   Encounter Diagnoses   Name Primary?    Knee pain, right anterior Yes    Tendinopathy of patella          Physician: Bob Denton IV, MD    Visit Date: 12/15/2022    Physician Orders: PT Eval and Treat Knee pain, tendinopathy of patella  Medical Diagnosis from Referral: Patellar tendon strain, right, initial encounter [S86.811A]  Evaluation Date: 10/4/2022  Authorization Period Expiration: 9/14/2023  Plan of Care Expiration: 12/4/2022  Visit # / Visits authorized: 5/ 20     Time In: 400pm  Time Out: 545pm  Total Appointment Time (timed & untimed codes): 45 minutes       FOTO 1st: 24%  FOTO 3rd:  FOTO 10th:      SUBJECTIVE     Pt reports: She experienced an episode of burning knee pain at the location of the tibial tuberosity after one of her heavy leg days. Pt notes the only thing that changed is using a different leg press machine.    She was compliant with home exercise program.  Response to previous treatment: Improved patellar tendon tolerance to loading  Functional change: Improved ability to prevent dynamic knee valgus    Pain: 1/10 at start  Location: right knee      OBJECTIVE     Objective Measures updated at progress report unless specified.     Treatment       Kathe received the treatments listed below:      Therapeutic exercises to develop strength, endurance, ROM, flexibility, and core stabilization for 18 minutes including:    Bike for 6' to improve cardiovascular endurance and tissue extensibility    Education on pain threshold limits and not pushing through the pain during activity.  Knee to wall 3 x 10 / side (education on keeping foot flat and not forcing stretch)  Decline anterior heel tap 3 x 10 (education on achieving knee flexion)    Manual therapy techniques: Joint mobilizations, Myofacial release, and Soft tissue Mobilization were applied  to the: R knee for 3 minutes, including:  Patellar mobs multidirectional grades III/IV  Infrapatellar mobs grades III/IV  Education/demonstration on self mobs.    Therapeutic activities to improve functional performance for 00  minutes, including:      Neuromuscular re-education activities to improve: Balance, Coordination, Kinesthetic, Sense, and Proprioception for 24 minutes. The following activities were included:      -[Bridges with adductor ball squeeze 3 x 10 (education on hip hinge)  SB bridge to HS curl 3 x 10 (education on terminal hip ext at end of HS curl, and hip hinge on way down)  Sit <> stand 20# 3 x 10 (education on preventing excessive forward lean)}-      Patient Education and Home Exercises     Home Exercises Provided and Patient Education Provided     Education provided:   - HEP  -See above    Written Home Exercises Provided: yes. Exercises were reviewed and Kathe was able to demonstrate them prior to the end of the session.  Kathe demonstrated good  understanding of the education provided. See EMR under Patient Instructions for exercises provided during therapy sessions    ASSESSMENT     Patient tolerated treatment well that was cut short since she had to leave early. Pt experienced a slight increase in ant knee pain with decline heel taps that disappeared with cessation of activity. Pt demonstrated improved ability to hinge at the hips and prevent excessive forward lean during sit <> stand with verbal and visual cues.    Kathe Is progressing well towards her goals.     Pt prognosis is Excellent.     Pt will continue to benefit from skilled outpatient physical therapy to address the deficits listed in the problem list box on initial evaluation, provide pt/family education and to maximize pt's level of independence in the home and community environment.     Pt's spiritual, cultural and educational needs considered and pt agreeable to plan of care and goals.     Anticipated barriers to physical  therapy: High activity level    Goals:   Short Term Goals: (3 weeks)  1. Pt will be independent with HEP in order to supplement patient in improving functional mobility. - progressing  2. Pt will improve (R ) knee pain to at least 1/10 during deep squats to improve function. - progressing  3. Pt will improve fat pad mobility to WNL in order to improve hip/knee AROM and improved gait function - progressing, not met  4. Pt will improve hip abduction strength from 3-/5 to 3+/5 to improve functional gait deviation. - progressing     Long Term Goals: (6 weeks)  1. Pt will be independent with updated HEP supplement PT in improving functional mobility. - progressing  2. Pt will improve (R) knee pain to at least 0/10 during deep squats. - progressing, not met  3. Pt will improve FOTO knee survey score to </= 37 % limited in order to demo improved functional mobility. - progressing, not met  4. Pt will perform heel tap with no dynamic knee valgus.  5. Pt will return to dance with 0/10 pain.    PLAN     Plan of care Certification: 10/4/2022 to 12/4/2022.     Outpatient Physical Therapy 1 times weekly for 12 weeks to include the following interventions: Manual Therapy, Moist Heat/ Ice, Neuromuscular Re-ed, Patient Education, Therapeutic Activities, and Therapeutic Exercise.     Wilbur Rudolph, PT , DPT

## 2022-12-21 ENCOUNTER — CLINICAL SUPPORT (OUTPATIENT)
Dept: REHABILITATION | Facility: HOSPITAL | Age: 42
End: 2022-12-21
Payer: COMMERCIAL

## 2022-12-21 DIAGNOSIS — M67.969 TENDINOPATHY OF PATELLA: ICD-10-CM

## 2022-12-21 DIAGNOSIS — M25.561 KNEE PAIN, RIGHT ANTERIOR: Primary | ICD-10-CM

## 2022-12-21 PROCEDURE — 97112 NEUROMUSCULAR REEDUCATION: CPT

## 2022-12-21 PROCEDURE — 97110 THERAPEUTIC EXERCISES: CPT

## 2022-12-21 NOTE — PROGRESS NOTES
[facility]   Physical Therapy Treatment Note     Name: Kathe Chamorro [facility]  Clinic Number: [id number]    Therapy Diagnosis:   Encounter Diagnoses   Name Primary?    Knee pain, right anterior Yes    Tendinopathy of patella          Physician: Bob Denton IV, MD    Visit Date: 12/21/2022    Physician Orders: PT Eval and Treat Knee pain, tendinopathy of patella  Medical Diagnosis from Referral: Patellar tendon strain, right, initial encounter [S86.811A]  Evaluation Date: 10/4/2022  Authorization Period Expiration: 9/14/2023  Plan of Care Expiration: 12/4/2022  Visit # / Visits authorized: 7/ 20     Time In: 10:00am  Time Out: 11:10am  Total Appointment Time (timed & untimed codes): 60 minutes       FOTO 1st: 24%  FOTO 3rd:  FOTO 10th:      SUBJECTIVE     Pt reports: She experienced an episode of burning knee pain at the location of the tibial tuberosity after one of her heavy leg days. Pt notes the only thing that changed is using a different leg press machine.    She was compliant with home exercise program.  Response to previous treatment: Improved patellar tendon tolerance to loading  Functional change: Improved ability to prevent dynamic knee valgus    Pain: 0/10 at start  Location: right knee      OBJECTIVE     Objective Measures updated at progress report unless specified.     Treatment       Kathe received the treatments listed below:      Therapeutic exercises to develop strength, endurance, ROM, flexibility, and core stabilization for 29 minutes including:    Bike for 6' to improve cardiovascular endurance and tissue extensibility    DL Shuttle MVP 2.5c 3 x 10  SL shuttle MVP 2c 2 x 15 / side  Sidelying SL shuttle MVP 2c 2 x 15 / side   Decline anterior heel tap 3 x 10 (education on achieving knee flexion)  Ice w/ compression 10'    Manual therapy techniques: Joint mobilizations, Myofacial release, and Soft tissue Mobilization were applied to the: R knee for  minutes,  including:  Patellar mobs multidirectional grades III/IV  Infrapatellar mobs grades III/IV  Education/demonstration on self mobs.    Therapeutic activities to improve functional performance for 00  minutes, including:      Neuromuscular re-education activities to improve: Balance, Coordination, Kinesthetic, Sense, and Proprioception for 41 minutes. The following activities were included:    Lateral band walks BTB 3 laps  Single leg squats 3 x 10 (education on preventing excessive forward lean and allowing forward knee angle progression)    -[Bridges with adductor ball squeeze w/ SL kickout 3 x 10 (education on hip hinge)  SB bridge to HS curl 3 x 10 (education on terminal hip ext at end of HS curl, and hip hinge on way down)  Sit <> stand 20# 3 x 10 (education on preventing excessive forward lean)}-      Patient Education and Home Exercises     Home Exercises Provided and Patient Education Provided     Education provided:   - HEP  -See above    Written Home Exercises Provided: yes. Exercises were reviewed and Kathe was able to demonstrate them prior to the end of the session.  Kathe demonstrated good  understanding of the education provided. See EMR under Patient Instructions for exercises provided during therapy sessions    ASSESSMENT     Patient demonstrating good dynamic knee control during SL activities with only min errors requiring min verbal cues to correct dynamic knee valgus w/ MR. Pt demonstrated improved ability to reduce hip flexion angle and improve forward knee progression angle during squatting activities. Pt tolerated tx well with no lasting increase in sx.    Kathe Is progressing well towards her goals.     Pt prognosis is Excellent.     Pt will continue to benefit from skilled outpatient physical therapy to address the deficits listed in the problem list box on initial evaluation, provide pt/family education and to maximize pt's level of independence in the home and community environment.      Pt's spiritual, cultural and educational needs considered and pt agreeable to plan of care and goals.     Anticipated barriers to physical therapy: High activity level    Goals:   Short Term Goals: (3 weeks)  1. Pt will be independent with HEP in order to supplement patient in improving functional mobility. - progressing  2. Pt will improve (R ) knee pain to at least 1/10 during deep squats to improve function. - progressing  3. Pt will improve fat pad mobility to WNL in order to improve hip/knee AROM and improved gait function - progressing, not met  4. Pt will improve hip abduction strength from 3-/5 to 3+/5 to improve functional gait deviation. - progressing     Long Term Goals: (6 weeks)  1. Pt will be independent with updated HEP supplement PT in improving functional mobility. - progressing  2. Pt will improve (R) knee pain to at least 0/10 during deep squats. - progressing, not met  3. Pt will improve FOTO knee survey score to </= 37 % limited in order to demo improved functional mobility. - progressing, not met  4. Pt will perform heel tap with no dynamic knee valgus.  5. Pt will return to dance with 0/10 pain.    PLAN     Plan of care Certification: 10/4/2022 to 12/4/2022.     Outpatient Physical Therapy 1 times weekly for 12 weeks to include the following interventions: Manual Therapy, Moist Heat/ Ice, Neuromuscular Re-ed, Patient Education, Therapeutic Activities, and Therapeutic Exercise.     Wilbur Rudolph, PT , DPT

## 2022-12-23 DIAGNOSIS — R52 PAIN: Primary | ICD-10-CM

## 2023-01-06 ENCOUNTER — OFFICE VISIT (OUTPATIENT)
Dept: SPORTS MEDICINE | Facility: CLINIC | Age: 43
End: 2023-01-06
Payer: COMMERCIAL

## 2023-01-06 ENCOUNTER — HOSPITAL ENCOUNTER (OUTPATIENT)
Dept: RADIOLOGY | Facility: HOSPITAL | Age: 43
Discharge: HOME | End: 2023-01-06
Attending: ORTHOPAEDIC SURGERY
Payer: COMMERCIAL

## 2023-01-06 VITALS
HEART RATE: 60 BPM | WEIGHT: 140 LBS | SYSTOLIC BLOOD PRESSURE: 122 MMHG | DIASTOLIC BLOOD PRESSURE: 79 MMHG | HEIGHT: 67 IN | BODY MASS INDEX: 21.97 KG/M2

## 2023-01-06 DIAGNOSIS — M75.01 ADHESIVE CAPSULITIS OF RIGHT SHOULDER: Primary | ICD-10-CM

## 2023-01-06 DIAGNOSIS — M25.511 RIGHT SHOULDER PAIN, UNSPECIFIED CHRONICITY: ICD-10-CM

## 2023-01-06 PROCEDURE — 73030 XR SHOULDER COMPLETE 2 OR MORE VIEWS RIGHT: ICD-10-PCS | Mod: 26,RT,, | Performed by: RADIOLOGY

## 2023-01-06 PROCEDURE — 73030 X-RAY EXAM OF SHOULDER: CPT | Mod: TC,RT

## 2023-01-06 PROCEDURE — 99213 OFFICE O/P EST LOW 20 MIN: CPT | Mod: 25,S$GLB,, | Performed by: ORTHOPAEDIC SURGERY

## 2023-01-06 PROCEDURE — 99213 PR OFFICE/OUTPT VISIT, EST, LEVL III, 20-29 MIN: ICD-10-PCS | Mod: 25,S$GLB,, | Performed by: ORTHOPAEDIC SURGERY

## 2023-01-06 PROCEDURE — 1159F PR MEDICATION LIST DOCUMENTED IN MEDICAL RECORD: ICD-10-PCS | Mod: CPTII,S$GLB,, | Performed by: ORTHOPAEDIC SURGERY

## 2023-01-06 PROCEDURE — 20611 LARGE JOINT ASPIRATION/INJECTION: R GLENOHUMERAL: ICD-10-PCS | Mod: RT,S$GLB,, | Performed by: ORTHOPAEDIC SURGERY

## 2023-01-06 PROCEDURE — 20611 DRAIN/INJ JOINT/BURSA W/US: CPT | Mod: RT,S$GLB,, | Performed by: ORTHOPAEDIC SURGERY

## 2023-01-06 PROCEDURE — 99999 PR PBB SHADOW E&M-EST. PATIENT-LVL III: CPT | Mod: PBBFAC,,, | Performed by: ORTHOPAEDIC SURGERY

## 2023-01-06 PROCEDURE — 3008F BODY MASS INDEX DOCD: CPT | Mod: CPTII,S$GLB,, | Performed by: ORTHOPAEDIC SURGERY

## 2023-01-06 PROCEDURE — 3078F PR MOST RECENT DIASTOLIC BLOOD PRESSURE < 80 MM HG: ICD-10-PCS | Mod: CPTII,S$GLB,, | Performed by: ORTHOPAEDIC SURGERY

## 2023-01-06 PROCEDURE — 3078F DIAST BP <80 MM HG: CPT | Mod: CPTII,S$GLB,, | Performed by: ORTHOPAEDIC SURGERY

## 2023-01-06 PROCEDURE — 3008F PR BODY MASS INDEX (BMI) DOCUMENTED: ICD-10-PCS | Mod: CPTII,S$GLB,, | Performed by: ORTHOPAEDIC SURGERY

## 2023-01-06 PROCEDURE — 1159F MED LIST DOCD IN RCRD: CPT | Mod: CPTII,S$GLB,, | Performed by: ORTHOPAEDIC SURGERY

## 2023-01-06 PROCEDURE — 3074F PR MOST RECENT SYSTOLIC BLOOD PRESSURE < 130 MM HG: ICD-10-PCS | Mod: CPTII,S$GLB,, | Performed by: ORTHOPAEDIC SURGERY

## 2023-01-06 PROCEDURE — 73030 X-RAY EXAM OF SHOULDER: CPT | Mod: 26,RT,, | Performed by: RADIOLOGY

## 2023-01-06 PROCEDURE — 3074F SYST BP LT 130 MM HG: CPT | Mod: CPTII,S$GLB,, | Performed by: ORTHOPAEDIC SURGERY

## 2023-01-06 PROCEDURE — 99999 PR PBB SHADOW E&M-EST. PATIENT-LVL III: ICD-10-PCS | Mod: PBBFAC,,, | Performed by: ORTHOPAEDIC SURGERY

## 2023-01-06 RX ORDER — TRIAMCINOLONE ACETONIDE 40 MG/ML
40 INJECTION, SUSPENSION INTRA-ARTICULAR; INTRAMUSCULAR
Status: DISCONTINUED | OUTPATIENT
Start: 2023-01-06 | End: 2023-01-06 | Stop reason: HOSPADM

## 2023-01-06 RX ADMIN — TRIAMCINOLONE ACETONIDE 40 MG: 40 INJECTION, SUSPENSION INTRA-ARTICULAR; INTRAMUSCULAR at 08:01

## 2023-01-06 NOTE — PROGRESS NOTES
NEW PATIENT    HISTORY OF PRESENT ILLNESS   42 y.o. 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 She enjoys going to the gym in her spare time. She states over the summer she started having shoulder pain and has had trouble lifting overhead. She also reports night pain. She has pain reaching behind her back. She does not recall a specific KELLY. She reports a loss in ROM and has most pain in the anterior shoulder.        Is affecting ADLs.  Pain is 6/10 at it's worst.        PAST MEDICAL HISTORY    Past Medical History:   Diagnosis Date    Fibroids        PAST SURGICAL HISTORY     Past Surgical History:   Procedure Laterality Date    Abdominal myomectomy via mini-laparotomy (2 fibroids  02/20/2018    pelvic pain, fibroids    COLONOSCOPY N/A 10/25/2021    Procedure: COLONOSCOPY, with me;  Surgeon: Sarah Price MD;  Location: [facility] (4TH FLR);  Service: Endoscopy;  Laterality: N/A;  pt completed COVID vaccine- see Immunization record in chart-rb  10/19 lvm to confirm appt-rb  10/20 arrival time confirmed with pt-rb    FLEXIBLE SIGMOIDOSCOPY N/A 11/22/2021    Procedure: SIGMOIDOSCOPY, FLEXIBLE;  Surgeon: Sarah Price MD;  Location: [facility] OR 2ND FLR;  Service: Colon and Rectal;  Laterality: N/A;    MYOMECTOMY      WISDOM TOOTH EXTRACTION         FAMILY HISTORY    Family History   Problem Relation Age of Onset    Breast cancer Maternal Grandmother         dx age unknown     Colon cancer Maternal Grandmother     Cancer Maternal Grandmother     Stroke Father     Hypertension Father     Ovarian cancer Neg Hx        SOCIAL HISTORY    Social History     Socioeconomic History    Marital status: Single   Tobacco Use    Smoking status: Never    Smokeless tobacco: Never   Substance and Sexual Activity    Alcohol use: No    Drug use: No    Sexual activity: Not Currently       MEDICATIONS      Current Outpatient Medications:     acetaminophen (TYLENOL) 325 MG tablet, Take 325 mg by mouth every  6 (six) hours as needed for Pain., Disp: , Rfl:     albuterol (PROVENTIL HFA) 90 mcg/actuation inhaler, Inhale 2 puffs into the lungs every 6 (six) hours as needed for Wheezing. Rescue, Disp: 18 g, Rfl: 0    cetirizine (ZYRTEC) 10 MG tablet, Take 1 tablet (10 mg total) by mouth once daily., Disp: 30 tablet, Rfl: 11    diclofenac sodium (VOLTAREN) 1 % Gel, Apply 2 g topically 3 (three) times daily., Disp: 50 g, Rfl: 2    fluticasone propionate (FLONASE) 50 mcg/actuation nasal spray, 1 spray (50 mcg total) by Each Nostril route once daily., Disp: 16 g, Rfl: 0    ipratropium (ATROVENT) 42 mcg (0.06 %) nasal spray, 2 sprays by Nasal route 4 (four) times daily., Disp: 15 mL, Rfl: 0    LATISSE 0.03 % ophthalmic solution, Apply 1 drop via applicator once a day, Disp: , Rfl:     promethazine-dextromethorphan (PROMETHAZINE-DM) 6.25-15 mg/5 mL Syrp, Take 5 mLs by mouth nightly as needed (for cough. Do not take with any other cough syrups. Can make you drowsy.)., Disp: 118 mL, Rfl: 0    TAZORAC 0.1 % cream, Apply topically as needed., Disp: , Rfl:     omeprazole (PRILOSEC) 40 MG capsule, Take 1 capsule (40 mg total) by mouth once daily. (Patient not taking: Reported on 9/14/2022), Disp: 30 capsule, Rfl: 2    ALLERGIES     Review of patient's allergies indicates:  No Known Allergies      REVIEW OF SYSTEMS   Constitution: Negative. Negative for chills, fever and night sweats.   HENT: Negative for congestion and headaches.    Eyes: Negative for blurred vision, left vision loss and right vision loss.   Cardiovascular: Negative for chest pain and syncope.   Respiratory: Negative for cough and shortness of breath.    Endocrine: Negative for polydipsia, polyphagia and polyuria.   Hematologic/Lymphatic: Negative for bleeding problem. Does not bruise/bleed easily.   Skin: Negative for dry skin, itching and rash.   Musculoskeletal: Negative for falls. Positive for right shoulder pain  Gastrointestinal: Negative for abdominal pain and  "bowel incontinence.   Genitourinary: Negative for bladder incontinence and nocturia.   Neurological: Negative for disturbances in coordination, loss of balance and seizures.   Psychiatric/Behavioral: Negative for depression. The patient does not have insomnia.    Allergic/Immunologic: Negative for hives and persistent infections.     PHYSICAL EXAMINATION    Vitals: /79   Pulse 60   Ht 5' 7" (1.702 m)   Wt 63.5 kg (140 lb)   BMI 21.93 kg/m²     General: The patient appears active and healthy with no apparent physical problems.  No disturbance of mood or affect is demonstrated. Alert and Oriented.    HEENT: Eyes normal, pupils equally round, nose normal.    Resp: Equal and symmetrical chest rises. No wheezing    CV: Regular rate    Neck: Supple; nonpainful range of motion.    Extremities: no cyanosis, clubbing, edema, or diffuse swelling.  Palpable pulses, good capillary refill of the digits.  No coolness, discoloration, edema or obvious varicosities.    Skin: no lesions noted.    Lymphatic: no detected adenopathy in the upper or lower extremities.    Neurologic: normal mental status, normal reflexes, normal gait and balance.  Patient is alert and oriented to person, place and time.  No flaccidity or spasticity is noted.  No motor or sensory deficits are noted.  Light touch is intact    Orthopaedic:     SHOULDER EXAM - RIGHT    Inspection:   Normal skin color and appearance with no scars.  No muscle atrophy noted.  No scapular winging.      Palpation: No tenderness of the acromioclavicular joint, lateral edge of the acromion, biceps tendon, trapezius muscle or scapulothoracic bursa.      ROM:      PROM:     FE - 180°    Abd/ER -  80°  Abd/IR -  10°   Add/ER - 50°     AROM:    FE - 180°    Abd/ER -  80°  Abd/IR -  10°   Add/ER -  50°  *IR to the hip, pain with resisted IR     Tests:     - Hogan, + Neer's, - Cross Arm Adduction, - Lincoln, - Yerguson, - Speed. - Belly Press *pain,  - Jobes, - Lift Off, *pain " with Bear hug    Stability: - sulcus, - apprehension, - relocation, - load and shift, - DLS      Motor:  Rotator cuff strength is 5/5 supraspinatus, 5/5 infraspinatus, 5/5 subscapularis. Biceps, triceps and deltoid strength is 5/5.      Neuro     Distally there are no paresthesias, and sensation is intact to light touch in the median, ulnar, and radial distributions.  Reflexes are 2/2 biceps, triceps and brachioradialis.        IMAGING    I reviewed her shoulder x-rays today which demonstrate no fractures or dislocations.  No osseous abnormalities.  Soft tissues appear to be intact.      IMPRESSION       ICD-10-CM ICD-9-CM   1. Adhesive capsulitis of right shoulder  M75.01 726.0   2. Right shoulder pain, unspecified chronicity  M25.511 719.41       MEDICATIONS PRESCRIBED      None    RECOMMENDATIONS     CSI of right glenohumeral joint performed under ultrasound guidance  Activity modifications given to the patient today  RTC in 1 month    Large Joint Aspiration/Injection: R glenohumeral    Date/Time: 1/6/2023 8:45 AM  Performed by: Tucker Rudolph MD  Authorized by: Tucker Rudolph MD     Consent Done?:  Yes (Verbal)  Indications:  Pain  Site marked: the procedure site was marked    Timeout: prior to procedure the correct patient, procedure, and site was verified    Prep: patient was prepped and draped in usual sterile fashion      Local anesthesia used?: Yes    Local anesthetic:  Co-phenylcaine spray (0.2% Naropin)  Anesthetic total (ml):  4      Details:  Needle Size:  22 G  Ultrasonic Guidance for needle placement?: Yes (Ultrasound guidance was used for needle localization.  Images were saved and stored for documentation.  Dynamic visualization of the needle was continuous throughout the procedure and maintained accurate placement.)    Images are saved and documented.  Approach:  Posterior  Location:  Shoulder  Site:  R glenohumeral  Medications:  40 mg triamcinolone acetonide 40 mg/mL  Medications comment:  5  mL LIDOcaine HCL 10 mg/ml (1%) 10 mg/mL (1 %)  Patient tolerance:  Patient tolerated the procedure well with no immediate complications        All questions were answered, pt will contact us for questions or concerns in the interim.

## 2023-04-11 ENCOUNTER — PATIENT MESSAGE (OUTPATIENT)
Dept: SPORTS MEDICINE | Facility: CLINIC | Age: 43
End: 2023-04-11
Payer: COMMERCIAL

## 2023-05-17 ENCOUNTER — HOSPITAL ENCOUNTER (OUTPATIENT)
Dept: RADIOLOGY | Facility: HOSPITAL | Age: 43
Discharge: HOME | End: 2023-05-17
Attending: ORTHOPAEDIC SURGERY
Payer: COMMERCIAL

## 2023-05-17 ENCOUNTER — TELEPHONE (OUTPATIENT)
Dept: ORTHOPEDICS | Facility: CLINIC | Age: 43
End: 2023-05-17
Payer: COMMERCIAL

## 2023-05-17 ENCOUNTER — OFFICE VISIT (OUTPATIENT)
Dept: ORTHOPEDICS | Facility: CLINIC | Age: 43
End: 2023-05-17
Payer: COMMERCIAL

## 2023-05-17 VITALS
WEIGHT: 142.88 LBS | HEIGHT: 67 IN | DIASTOLIC BLOOD PRESSURE: 84 MMHG | SYSTOLIC BLOOD PRESSURE: 134 MMHG | BODY MASS INDEX: 22.43 KG/M2 | HEART RATE: 54 BPM

## 2023-05-17 DIAGNOSIS — M75.41 ROTATOR CUFF IMPINGEMENT SYNDROME OF RIGHT SHOULDER: ICD-10-CM

## 2023-05-17 DIAGNOSIS — R52 PAIN: ICD-10-CM

## 2023-05-17 DIAGNOSIS — R52 PAIN: Primary | ICD-10-CM

## 2023-05-17 DIAGNOSIS — M75.42 ROTATOR CUFF IMPINGEMENT SYNDROME OF LEFT SHOULDER: Primary | ICD-10-CM

## 2023-05-17 DIAGNOSIS — G56.02 LEFT CARPAL TUNNEL SYNDROME: ICD-10-CM

## 2023-05-17 PROCEDURE — 1159F MED LIST DOCD IN RCRD: CPT | Mod: CPTII,S$GLB,, | Performed by: ORTHOPAEDIC SURGERY

## 2023-05-17 PROCEDURE — 3008F PR BODY MASS INDEX (BMI) DOCUMENTED: ICD-10-PCS | Mod: CPTII,S$GLB,, | Performed by: ORTHOPAEDIC SURGERY

## 2023-05-17 PROCEDURE — 3079F DIAST BP 80-89 MM HG: CPT | Mod: CPTII,S$GLB,, | Performed by: ORTHOPAEDIC SURGERY

## 2023-05-17 PROCEDURE — 3075F PR MOST RECENT SYSTOLIC BLOOD PRESS GE 130-139MM HG: ICD-10-PCS | Mod: CPTII,S$GLB,, | Performed by: ORTHOPAEDIC SURGERY

## 2023-05-17 PROCEDURE — 73030 X-RAY EXAM OF SHOULDER: CPT | Mod: 26,LT,, | Performed by: RADIOLOGY

## 2023-05-17 PROCEDURE — 99999 PR PBB SHADOW E&M-EST. PATIENT-LVL IV: ICD-10-PCS | Mod: PBBFAC,,, | Performed by: ORTHOPAEDIC SURGERY

## 2023-05-17 PROCEDURE — 3079F PR MOST RECENT DIASTOLIC BLOOD PRESSURE 80-89 MM HG: ICD-10-PCS | Mod: CPTII,S$GLB,, | Performed by: ORTHOPAEDIC SURGERY

## 2023-05-17 PROCEDURE — 99999 PR PBB SHADOW E&M-EST. PATIENT-LVL IV: CPT | Mod: PBBFAC,,, | Performed by: ORTHOPAEDIC SURGERY

## 2023-05-17 PROCEDURE — 3075F SYST BP GE 130 - 139MM HG: CPT | Mod: CPTII,S$GLB,, | Performed by: ORTHOPAEDIC SURGERY

## 2023-05-17 PROCEDURE — 73030 XR SHOULDER COMPLETE 2 OR MORE VIEWS LEFT: ICD-10-PCS | Mod: 26,LT,, | Performed by: RADIOLOGY

## 2023-05-17 PROCEDURE — 1159F PR MEDICATION LIST DOCUMENTED IN MEDICAL RECORD: ICD-10-PCS | Mod: CPTII,S$GLB,, | Performed by: ORTHOPAEDIC SURGERY

## 2023-05-17 PROCEDURE — 99213 OFFICE O/P EST LOW 20 MIN: CPT | Mod: S$GLB,,, | Performed by: ORTHOPAEDIC SURGERY

## 2023-05-17 PROCEDURE — 1160F RVW MEDS BY RX/DR IN RCRD: CPT | Mod: CPTII,S$GLB,, | Performed by: ORTHOPAEDIC SURGERY

## 2023-05-17 PROCEDURE — 73030 X-RAY EXAM OF SHOULDER: CPT | Mod: TC,PN,LT

## 2023-05-17 PROCEDURE — 1160F PR REVIEW ALL MEDS BY PRESCRIBER/CLIN PHARMACIST DOCUMENTED: ICD-10-PCS | Mod: CPTII,S$GLB,, | Performed by: ORTHOPAEDIC SURGERY

## 2023-05-17 PROCEDURE — 99213 PR OFFICE/OUTPT VISIT, EST, LEVL III, 20-29 MIN: ICD-10-PCS | Mod: S$GLB,,, | Performed by: ORTHOPAEDIC SURGERY

## 2023-05-17 PROCEDURE — 3008F BODY MASS INDEX DOCD: CPT | Mod: CPTII,S$GLB,, | Performed by: ORTHOPAEDIC SURGERY

## 2023-05-17 NOTE — PROGRESS NOTES
[facility], Orthopedics and Sports Medicine  [facility]    Established Patient Shoulder Office Visit  05/17/2023       Subjective:      Kathe Hudson is a 43 y.o. female who returns for follow up treatment of new problem: bilateral shoulder pain and pain into the left hand fingers. 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 Since last summer she has had shoulder pain, difficulty reaching behind her back and weakness lifting overhead weights.     At sports med office visit 5 months ago the patient was given a corticosteroid injection (right glenohumeral), which did not improve the symptoms beyond a couple weeks.  The diagnosis at that time was right adhesive capsulitis; she did not have left shoulder problem at that time.     The patient now has the following symptoms: pain, weakness, mechanical catching/locking/grinding, stiffness, and numbness/tingling.  The symptoms are unchanged. 7/10 severity.     Also complains of left hand 3rd and 4rd digit pain mostly dorsal.     Other records reviewed: historical medical records, office notes.    Past Medical History:   Diagnosis Date    Fibroids        Patient Active Problem List   Diagnosis    S/P myomectomy    Screening for malignant neoplasm of colon    Benign neuroendocrine neoplasm of rectum    Chest pain of unknown etiology    History of COVID-19    Nonspecific abnormal electrocardiogram (ECG) (EKG)    Patellar tendon strain, right, initial encounter    Knee pain, right anterior    Tendinopathy of patella       Past Surgical History:   Procedure Laterality Date    Abdominal myomectomy via mini-laparotomy (2 fibroids  02/20/2018    pelvic pain, fibroids    COLONOSCOPY N/A 10/25/2021    Procedure: COLONOSCOPY, with me;  Surgeon: Sarah Price MD;  Location: [address]);  Service: Endoscopy;  Laterality: N/A;  pt completed COVID vaccine- see Immunization record in chart-rb  10/19 lvm to confirm  appt-rb  10/20 arrival time confirmed with pt-rb    FLEXIBLE SIGMOIDOSCOPY N/A 11/22/2021    Procedure: SIGMOIDOSCOPY, FLEXIBLE;  Surgeon: Sarah Price MD;  Location: [facility] OR [address] [place] [address];  Service: Colon and Rectal;  Laterality: N/A;    MYOMECTOMY      WISDOM TOOTH EXTRACTION          Current Outpatient Medications   Medication Instructions    acetaminophen (TYLENOL) 325 mg, Oral, Every 6 hours PRN    albuterol (PROVENTIL HFA) 90 mcg/actuation inhaler 2 puffs, Inhalation, Every 6 hours PRN, Rescue    cetirizine (ZYRTEC) 10 mg, Oral, Daily    diclofenac sodium (VOLTAREN) 2 g, Topical (Top), 3 times daily    fluticasone propionate (FLONASE) 50 mcg, Each Nostril, Daily    ipratropium (ATROVENT) 42 mcg (0.06 %) nasal spray 2 sprays, Nasal, 4 times daily    LATISSE 0.03 % ophthalmic solution Apply 1 drop via applicator once a day    omeprazole (PRILOSEC) 40 mg, Oral, Daily    promethazine-dextromethorphan (PROMETHAZINE-DM) 6.25-15 mg/5 mL Syrp 5 mLs, Oral, Nightly PRN    TAZORAC 0.1 % cream Topical (Top), As needed (PRN)        Review of patient's allergies indicates:  No Known Allergies    Social History     Socioeconomic History    Marital status: Single   Tobacco Use    Smoking status: Never    Smokeless tobacco: Never   Substance and Sexual Activity    Alcohol use: No    Drug use: No    Sexual activity: Not Currently       Family History   Problem Relation Age of Onset    Breast cancer Maternal Grandmother         dx age unknown     Colon cancer Maternal Grandmother     Cancer Maternal Grandmother     Stroke Father     Hypertension Father     Ovarian cancer Neg Hx        Review of Systems     10 point review of systems was conducted and only the pertinent positives and pertinent negatives are noted above in the HPI section.       Objective:      General    Nursing note and vitals reviewed.  Constitutional: She is oriented to person, place, and time. She appears well-developed and well-nourished.   HENT:   Head:  Normocephalic and atraumatic.   Eyes: Pupils are equal, round, and reactive to light.   Cardiovascular:  Normal rate and regular rhythm.            Pulmonary/Chest: Effort normal.   Abdominal: Soft.   Neurological: She is alert and oriented to person, place, and time.   Psychiatric: She has a normal mood and affect. Her behavior is normal.         Left Hand/Wrist Exam     Tests   Phalens Sign: negative  Finkelstein's test: negative  Carpal Tunnel Compression Test: negative    Atrophy  Thenar:  Negative  Hypothenar:  negative    Other     Sensory Exam  Median Distribution: normal  Ulnar Distribution: normal  Radial Distribution: normal      Right Shoulder Exam     Inspection/Observation   Swelling: absent  Bruising: absent  Deformity: absent    Tenderness   The patient is tender to palpation of the acromioclavicular joint, biceps tendon and greater tuberosity.    Range of Motion   Active abduction:  170   Passive abduction:  170   Forward Flexion:  160   External Rotation 0 degrees:  40   Internal rotation 0 degrees:  T10     Tests & Signs   Drop arm: negative  Hogan test: positive  Impingement: positive  Rotator Cuff Painful Arc/Range: mild  Speed's Test: positive    Other   Sensation: normal    Left Shoulder Exam     Inspection/Observation   Swelling: absent  Bruising: absent  Deformity: absent    Tenderness   The patient is tender to palpation of the acromioclavicular joint, biceps tendon and greater tuberosity.    Range of Motion   Active abduction:  160   Passive abduction:  170   Forward Flexion:  160   External Rotation 0 degrees:  50   Internal rotation 0 degrees:  L2     Tests & Signs   Drop arm: negative  Hogan test: positive  Impingement: positive  Rotator Cuff Painful Arc/Range: moderate  Active Compression Test ([place]'s Sign): positive  Speed's Test: positive    Other   Sensation: normal     Comments:  Negative spurlings       Muscle Strength   Right Upper Extremity   Shoulder Abduction: 5/5    Shoulder Internal Rotation: 5/5   Shoulder External Rotation: 5/5   Biceps: 5/5   Left Upper Extremity  Shoulder Abduction: 4/5   Shoulder Internal Rotation: 4/5   Shoulder External Rotation: 4/5   Biceps: 5/5     Vascular Exam     Right Pulses      Radial:                    2+      Left Pulses      Radial:                    2+      Capillary Refill  Left Hand: normal capillary refill      Imaging:  XR Shoulder, left today    Procedures      Assessment:       Kathe Hudson is a 43 y.o. female seen in the office today. The primary encounter diagnosis was Rotator cuff impingement syndrome of left shoulder. Diagnoses of Rotator cuff impingement syndrome of right shoulder and Left carpal tunnel syndrome were also pertinent to this visit.  Non-operative treatment is recommended at this time. Conservative management and physical therapy. If not improving, pt to return for injection and possible MRI. Offered CSI today but patient declined.  The natural history and expected course discussed with patient. Various treatment options were discussed, including their risks and benefits. All of the patient's questions were answered.     Plan:      Physical therapy and rehabilitation treatment.  Tylenol 650mg TID, PRN pain.  Follow up in 6 weeks.   Follow up as needed if symptoms worsen.  Left wrist splint for carpal tunnel          Khloe Polo MD, [facility] PGY-3  05/17/2023    Bob Denton IV, MD   of Clinical Orthopedics  Department of Orthopedic Surgery  [facility]  Office: [phone]  Website: [email or web address]      Orders Placed This Encounter    Ambulatory referral/consult to Physical/Occupational Therapy

## 2023-05-19 ENCOUNTER — PATIENT MESSAGE (OUTPATIENT)
Dept: ORTHOPEDICS | Facility: CLINIC | Age: 43
End: 2023-05-19
Payer: COMMERCIAL

## 2023-05-26 ENCOUNTER — DOCUMENTATION ONLY (OUTPATIENT)
Dept: REHABILITATION | Facility: HOSPITAL | Age: 43
End: 2023-05-26

## 2023-05-30 ENCOUNTER — CLINICAL SUPPORT (OUTPATIENT)
Dept: REHABILITATION | Facility: HOSPITAL | Age: 43
End: 2023-05-30
Payer: COMMERCIAL

## 2023-05-30 DIAGNOSIS — M25.512 CHRONIC PAIN OF BOTH SHOULDERS: ICD-10-CM

## 2023-05-30 DIAGNOSIS — M75.42 ROTATOR CUFF IMPINGEMENT SYNDROME OF LEFT SHOULDER: ICD-10-CM

## 2023-05-30 DIAGNOSIS — M25.511 CHRONIC PAIN OF BOTH SHOULDERS: ICD-10-CM

## 2023-05-30 DIAGNOSIS — G89.29 CHRONIC PAIN OF BOTH SHOULDERS: ICD-10-CM

## 2023-05-30 DIAGNOSIS — M75.41 ROTATOR CUFF IMPINGEMENT SYNDROME OF RIGHT SHOULDER: ICD-10-CM

## 2023-05-30 PROCEDURE — 97165 OT EVAL LOW COMPLEX 30 MIN: CPT | Mod: PO

## 2023-05-30 PROCEDURE — 97530 THERAPEUTIC ACTIVITIES: CPT | Mod: PO

## 2023-05-30 NOTE — PROGRESS NOTES
[facility] Occupational Therapy  Initial Evaluation     Date: 5/30/2023  Patient: Kathe Hudson  Chart Number: [id number]  Referring Physician: Khloe Polo MD  Therapy Diagnosis:   1. Rotator cuff impingement syndrome of left shoulder  Ambulatory referral/consult to Physical/Occupational Therapy      2. Rotator cuff impingement syndrome of right shoulder  Ambulatory referral/consult to Physical/Occupational Therapy      3. Chronic pain of both shoulders            Medical Diagnosis:   M25.511,G89.29,M25.512 (ICD-10-CM) - Chronic pain of both shoulders   M75.42 (ICD-10-CM) - Rotator cuff impingement syndrome of left shoulder   M75.41 (ICD-10-CM) - Rotator cuff impingement syndrome of right shoulder     Physician Orders: Eval and treat  Evaluation Date: 5/30/2023  Plan of Care Certification Date: 7/25/2023  Authorization Period: 5/16/2024  Surgery Date and Procedure: NA  Date of Return to MD: 6/21/2023    Visit #: 1 of 1  Time In: 1: 45 PM  Time Out: 2:30 PM  Total Billable Time: 45 minutes    Precautions: Standard    Subjective     Involved Side: Bilateral   Dominant Side: Right  Date of Onset: 1 year  History of Current Condition: Kathe Hudson is a 43 y.o. female who presents with bilateral shoulder pain and pain into the left hand fingers. 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 Since last summer she has had shoulder pain, difficulty reaching behind her back and weakness lifting overhead weights. At sports med office visit 5 months ago the patient was given a corticosteroid injection (right glenohumeral), which did not improve the symptoms beyond a couple weeks.  The diagnosis at that time was right adhesive capsulitis; she did not have left shoulder problem at that time. The patient now has the following symptoms: pain, weakness, mechanical catching/locking/grinding, stiffness, and numbness/tingling.  The symptoms are unchanged. 7/10 severity.    Also complains of left hand 3rd and 4rd digit pain mostly dorsal.   Imaging: Left glenohumeral and AC joint articulations appear intact without acute fracture, dislocation, or osseous destruction.  Previous Therapy: None    Patient's Goals for Therapy: To be pain free and return to full mobility and strength in upper body    Pain:  Functional Pain Scale Rating 0-10:   7/10 on average  2/10 at best  10/10 at worst  Location: lats, anterior shoulder, and pecs  Description: soreness, pressure, limiting  Aggravating Factors: Internal rotation to reach behind body, crossing body, abduction at 90 degrees, lifting weighted objects  Easing Factors: ice and rest    Occupation:  Dance instructor   Working presently: employed  Duties: Choreography and paperwork    Functional Limitations/Social History:    Previous functional status includes: Independent with all ADLs.     Current Functional Status   Home/Living environment: lives with their father      Limitation of Functional Status as follows:   ADLs/IADLs:     - Feeding: Independent     - Bathing: Difficulties with washing back and pushing up to get out of tub    - Dressing/Grooming: Difficulties with donning/doffing shirt, difficulty with donning bra    - Home Management: Difficulties with sweeping motions, stirring motions, reaching overhead to get things out of cabinets    - Driving: Independent- some pain with turning motions     Leisure: Exercising/weightlifting    Past Medical History/Physical Systems Review:   Kathe Hudson  has a past medical history of Fibroids.    Kathe Hudson  has a past surgical history that includes [place] tooth extraction; Abdominal myomectomy via mini-laparotomy (2 fibroids (02/20/2018); Myomectomy; Colonoscopy (N/A, 10/25/2021); and Flexible sigmoidoscopy (N/A, 11/22/2021).    Kathe has a current medication list which includes the following prescription(s): acetaminophen, albuterol, cetirizine, diclofenac sodium,  fluticasone propionate, ipratropium, latisse, omeprazole, promethazine-dextromethorphan, and tazorac.    Review of patient's allergies indicates:  No Known Allergies       Objective     Mental status: alert, oriented x3    Observation:   Unremarkable    Sensation: Intact but complains of numbness in fourth finger.   5/30/2023    Left   [place] Yina    Normal 1.65-2.83 X   Diminished Light Touch 3.22-3.61    Diminished Protective 3.84-4.31    Loss of Protective 4.56-6.65    Untestable >6.65        Range of Motion:     Shoulder ROM. Measured in degrees.   5/31/2023 5/31/2023    Left Right   Shoulder Flexion 140 144   Shoulder Extension 36 35   Shoulder Abduction 155 145   Shoulder ER 66 40   Shoulder IR 65 66     Special Tests:      Left Right   [place]ogan Popeye + +   Neers + +      Strength (Dynamometer) and Pinch Strength (Pinch Gauge)  Measured in pounds.   5/31/2023 5/31/2023    Left Right   Rung II 25.1 29.5      FOTO not captured this session 2/2/ time constraints.        Treatment     Treatment Time In: 2:17  Treatment Time Out: 2:30  Total Treatment time separate from Evaluation time:13 minutes    Kathe participated in dynamic functional therapeutic activities to improve functional performance for 13  minutes, including:  -Hep training and education     Home Exercise Program/Education:  Issued HEP (see patient instructions in EMR) and educated on modality use for pain management . Exercises were reviewed and Kathe was able to demonstrate them prior to the end of the session.   Pt received a written copy of exercises to perform at home. Kathe demonstrated good  understanding of the education provided.  Pt was advised to perform these exercises free of pain, and to stop performing them if pain occurs.    Patient/Family Education: role of OT, goals for OT, scheduling/cancellations - pt verbalized understanding. Discussed insurance limitations with patient.    Additional Education provided: Use of heat,  do not perform ax with reaching over 90, no load overhead with items, chest opening/stretching exercises      Assessment     Kathe Hudson is a 43 y.o. female presents with limitations as described in problem list. Patient can benefit from Occupational Therapy services for Iontophoresis, ultrasound, moist heat, therapeutic exercises, home exercise program provied with written instructions, ice and strengthening and orthotics, if deemed necessary . The following goals were discussed with the patient and she is in agreement with them as to be addressed in the treatment plan.    The patient's rehab potential is Good.     Anticipated barriers to occupational therapy: Chronic shoulder issues  Pt has no cultural, educational or language barriers to learning provided.    Profile and History Assessment of Occupational Performance Level of Clinical Decision Making Complexity Score   Occupational Profile:   Kathe Hudson is a 43 y.o. female who is currently employed Kathe Hudson has difficulty with  ADLs and IADLs as listed previously, which  affecting his/her daily functional abilities.      Comorbidities:    has a past medical history of Fibroids.    Medical and Therapy History Review:   Brief               Performance Deficits    Physical:  Joint Mobility  Joint Stability  Muscle Power/Strength   Strength  Pain    Cognitive:  No Deficits    Psychosocial:    No Deficits     Clinical Decision Making:  low    Modification/Need for Assistance:  Not Necessary    Intervention Selection:  Limited Treatment Options       low  Based on PMHX, co morbidities , data from assessments and functional level of assistance required with task and clinical presentation directly impacting function.         Goals:    LTG's (8 weeks):  1)   Increase ROM to WFL degrees in bilateral shoulders to increase functional hand use for overhead reaching.  2)   Increase  strength 10 lbs. In both hands to grasp dumb  bishop.  4)   Decrease complaints of pain to  1 out of 10 at worst to increase functional hand use for ADL/work/leisure activities.  5)   Pt will return to near to prior level of function for ADLs and household management reporting I or Mod I with ADLs (dressing, feeding, grooming, toileting).     STG's (4 weeks)  1)   Patient to be IND with HEP and modalities for pain/edema managment.  2)   Increase ROM to 70% WFL degrees in bilateral shoulders to increase functional hand use for ADLs/work/leisure activities.  3)   Increase  strength 5 lbs. In both hands to improve functional grasp for ADLs/work/leisure activities.   6)   Decrease complaints of pain to  6 out of 10 at worst to increase functional hand use for ADL/work/leisure activities.    Plan     Pt to be treated by Occupational Therapy 1 times per week for 8 weeks during the certification period from 5/30/2023 to 7/25/2023 to achieve the established goals.     Treatment to include: Manual therapy/joint mobilizations, Modalities for pain management, US 3 mhz, Therapeutic exercises/activities., Iontophoresis with 2.0 cc Dexamethasone, Strengthening, and Joint Protection, as well as any other treatments deemed necessary based on the patient's needs or progress.     Grace Dubon, OTR/L, [facility]  Occupational Therapist

## 2023-05-30 NOTE — PATIENT INSTRUCTIONS
Flexibility: Corner Stretch        Standing in corner with hands just above shoulder level, lean forward until a comfortable stretch is felt across chest. Hold 10 seconds.  Repeat 10 times. Do 3 sessions per day.

## 2023-05-31 PROBLEM — M25.511 BILATERAL SHOULDER PAIN: Status: ACTIVE | Noted: 2023-05-31

## 2023-05-31 PROBLEM — M25.512 BILATERAL SHOULDER PAIN: Status: ACTIVE | Noted: 2023-05-31

## 2023-05-31 NOTE — PLAN OF CARE
[facility] Occupational Therapy  Initial Evaluation     Date: 5/30/2023  Patient: Kathe Hudson  Chart Number: [id number]  Referring Physician: Khloe Polo MD  Therapy Diagnosis:   1. Rotator cuff impingement syndrome of left shoulder  Ambulatory referral/consult to Physical/Occupational Therapy      2. Rotator cuff impingement syndrome of right shoulder  Ambulatory referral/consult to Physical/Occupational Therapy      3. Chronic pain of both shoulders            Medical Diagnosis:   M25.511,G89.29,M25.512 (ICD-10-CM) - Chronic pain of both shoulders   M75.42 (ICD-10-CM) - Rotator cuff impingement syndrome of left shoulder   M75.41 (ICD-10-CM) - Rotator cuff impingement syndrome of right shoulder     Physician Orders: Eval and treat  Evaluation Date: 5/30/2023  Plan of Care Certification Date: 7/25/2023  Authorization Period: 5/16/2024  Surgery Date and Procedure: NA  Date of Return to MD: 6/21/2023    Visit #: 1 of 1  Time In: 1: 45 PM  Time Out: 2:30 PM  Total Billable Time: 45 minutes    Precautions: Standard    Subjective     Involved Side: Bilateral   Dominant Side: Right  Date of Onset: 1 year  History of Current Condition: Kathe Hudson is a 43 y.o. female who presents with bilateral shoulder pain and pain into the left hand fingers. 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 Since last summer she has had shoulder pain, difficulty reaching behind her back and weakness lifting overhead weights. At sports med office visit 5 months ago the patient was given a corticosteroid injection (right glenohumeral), which did not improve the symptoms beyond a couple weeks.  The diagnosis at that time was right adhesive capsulitis; she did not have left shoulder problem at that time. The patient now has the following symptoms: pain, weakness, mechanical catching/locking/grinding, stiffness, and numbness/tingling.  The symptoms are unchanged. 7/10 severity.    Also complains of left hand 3rd and 4rd digit pain mostly dorsal.   Imaging: Left glenohumeral and AC joint articulations appear intact without acute fracture, dislocation, or osseous destruction.  Previous Therapy: None    Patient's Goals for Therapy: To be pain free and return to full mobility and strength in upper body    Pain:  Functional Pain Scale Rating 0-10:   7/10 on average  2/10 at best  10/10 at worst  Location: lats, anterior shoulder, and pecs  Description: soreness, pressure, limiting  Aggravating Factors: Internal rotation to reach behind body, crossing body, abduction at 90 degrees, lifting weighted objects  Easing Factors: ice and rest    Occupation:  Dance instructor   Working presently: employed  Duties: Choreography and paperwork    Functional Limitations/Social History:    Previous functional status includes: Independent with all ADLs.     Current Functional Status   Home/Living environment: lives with their father      Limitation of Functional Status as follows:   ADLs/IADLs:     - Feeding: Independent     - Bathing: Difficulties with washing back and pushing up to get out of tub    - Dressing/Grooming: Difficulties with donning/doffing shirt, difficulty with donning bra    - Home Management: Difficulties with sweeping motions, stirring motions, reaching overhead to get things out of cabinets    - Driving: Independent- some pain with turning motions     Leisure: Exercising/weightlifting    Past Medical History/Physical Systems Review:   Kathe Hudson  has a past medical history of Fibroids.    Kathe Hudson  has a past surgical history that includes [place] tooth extraction; Abdominal myomectomy via mini-laparotomy (2 fibroids (02/20/2018); Myomectomy; Colonoscopy (N/A, 10/25/2021); and Flexible sigmoidoscopy (N/A, 11/22/2021).    Kathe has a current medication list which includes the following prescription(s): acetaminophen, albuterol, cetirizine, diclofenac sodium,  fluticasone propionate, ipratropium, latisse, omeprazole, promethazine-dextromethorphan, and tazorac.    Review of patient's allergies indicates:  No Known Allergies       Objective     Mental status: alert, oriented x3    Observation:   Unremarkable    Sensation: Intact but complains of numbness in fourth finger.   5/30/2023    Left   [place] Yina    Normal 1.65-2.83 X   Diminished Light Touch 3.22-3.61    Diminished Protective 3.84-4.31    Loss of Protective 4.56-6.65    Untestable >6.65        Range of Motion:     Shoulder ROM. Measured in degrees.   5/31/2023 5/31/2023    Left Right   Shoulder Flexion 140 144   Shoulder Extension 36 35   Shoulder Abduction 155 145   Shoulder ER 66 40   Shoulder IR 65 66     Special Tests:      Left Right   [place] Popeye + +   Neers + +      Strength (Dynamometer) and Pinch Strength (Pinch Gauge)  Measured in pounds.   5/31/2023 5/31/2023    Left Right   Rung II 25.1 29.5      FOTO not captured this session 2/2/ time constraints.        Treatment     Treatment Time In: 2:17  Treatment Time Out: 2:30  Total Treatment time separate from Evaluation time:13 minutes    Kathe participated in dynamic functional therapeutic activities to improve functional performance for 13  minutes, including:  -Hep training and education     Home Exercise Program/Education:  Issued HEP (see patient instructions in EMR) and educated on modality use for pain management . Exercises were reviewed and Kathe was able to demonstrate them prior to the end of the session.   Pt received a written copy of exercises to perform at home. Kathe demonstrated good  understanding of the education provided.  Pt was advised to perform these exercises free of pain, and to stop performing them if pain occurs.    Patient/Family Education: role of OT, goals for OT, scheduling/cancellations - pt verbalized understanding. Discussed insurance limitations with patient.    Additional Education provided: Use of heat,  do not perform ax with reaching over 90, no load overhead with items, chest opening/stretching exercises      Assessment     Kathe Hudson is a 43 y.o. female presents with limitations as described in problem list. Patient can benefit from Occupational Therapy services for Iontophoresis, ultrasound, moist heat, therapeutic exercises, home exercise program provied with written instructions, ice and strengthening and orthotics, if deemed necessary . The following goals were discussed with the patient and she is in agreement with them as to be addressed in the treatment plan.    The patient's rehab potential is Good.     Anticipated barriers to occupational therapy: Chronic shoulder issues  Pt has no cultural, educational or language barriers to learning provided.    Profile and History Assessment of Occupational Performance Level of Clinical Decision Making Complexity Score   Occupational Profile:   Kathe Hudson is a 43 y.o. female who is currently employed Kathe Hudson has difficulty with  ADLs and IADLs as listed previously, which  affecting his/her daily functional abilities.      Comorbidities:    has a past medical history of Fibroids.    Medical and Therapy History Review:   Brief               Performance Deficits    Physical:  Joint Mobility  Joint Stability  Muscle Power/Strength   Strength  Pain    Cognitive:  No Deficits    Psychosocial:    No Deficits     Clinical Decision Making:  low    Modification/Need for Assistance:  Not Necessary    Intervention Selection:  Limited Treatment Options       low  Based on PMHX, co morbidities , data from assessments and functional level of assistance required with task and clinical presentation directly impacting function.         Goals:    LTG's (8 weeks):  1)   Increase ROM to WFL degrees in bilateral shoulders to increase functional hand use for overhead reaching.  2)   Increase  strength 10 lbs. In both hands to grasp dumb  bishop.  4)   Decrease complaints of pain to  1 out of 10 at worst to increase functional hand use for ADL/work/leisure activities.  5)   Pt will return to near to prior level of function for ADLs and household management reporting I or Mod I with ADLs (dressing, feeding, grooming, toileting).     STG's (4 weeks)  1)   Patient to be IND with HEP and modalities for pain/edema managment.  2)   Increase ROM to 70% WFL degrees in bilateral shoulders to increase functional hand use for ADLs/work/leisure activities.  3)   Increase  strength 5 lbs. In both hands to improve functional grasp for ADLs/work/leisure activities.   6)   Decrease complaints of pain to  6 out of 10 at worst to increase functional hand use for ADL/work/leisure activities.    Plan     Pt to be treated by Occupational Therapy 1 times per week for 8 weeks during the certification period from 5/30/2023 to 7/25/2023 to achieve the established goals.     Treatment to include: Manual therapy/joint mobilizations, Modalities for pain management, US 3 mhz, Therapeutic exercises/activities., Iontophoresis with 2.0 cc Dexamethasone, Strengthening, and Joint Protection, as well as any other treatments deemed necessary based on the patient's needs or progress.     Grace Dubon, OTR/L, [facility]  Occupational Therapist

## 2023-06-06 ENCOUNTER — CLINICAL SUPPORT (OUTPATIENT)
Dept: REHABILITATION | Facility: HOSPITAL | Age: 43
End: 2023-06-06
Payer: COMMERCIAL

## 2023-06-06 DIAGNOSIS — M25.511 CHRONIC PAIN OF BOTH SHOULDERS: Primary | ICD-10-CM

## 2023-06-06 DIAGNOSIS — M25.512 CHRONIC PAIN OF BOTH SHOULDERS: Primary | ICD-10-CM

## 2023-06-06 DIAGNOSIS — G89.29 CHRONIC PAIN OF BOTH SHOULDERS: Primary | ICD-10-CM

## 2023-06-06 PROCEDURE — 97110 THERAPEUTIC EXERCISES: CPT | Mod: PO,CO

## 2023-06-06 PROCEDURE — 97140 MANUAL THERAPY 1/> REGIONS: CPT | Mod: PO,CO

## 2023-06-06 NOTE — PROGRESS NOTES
"[facility]  Occupational Therapy Treatment Note     Date: 6/6/2023  Name: Kathe Chamorro Tristan  Clinic Number: [id number]    Therapy Diagnosis:   Encounter Diagnosis   Name Primary?    Chronic pain of both shoulders Yes     Physician: Khloe Polo MD       Medical Diagnosis:   M25.511,G89.29,M25.512 (ICD-10-CM) - Chronic pain of both shoulders   M75.42 (ICD-10-CM) - Rotator cuff impingement syndrome of left shoulder   M75.41 (ICD-10-CM) - Rotator cuff impingement syndrome of right shoulder      Physician Orders: Eval and treat  Evaluation Date: 5/30/2023  Plan of Care Certification Date: 7/25/2023  Authorization Period: 5/16/2024  Surgery Date and Procedure: NA  Date of Return to MD: 6/21/2023     Visit #: 1 of 20  Time In: 9:45 AM  Time Out: 10:30 AM  Total Billable Time: 45 minutes     Precautions: Standard      Subjective     Pt reports: "I feel really stiff when I wake up in the morning but once I start moving around it feels better."  She was compliant with home exercise program given last session.   Response to previous treatment:first tx   Functional change: none yet    Pain: 7/10  Location: bilateral shoulder      Objective     Objective Measures updated at progress report unless specified.    Treatment     Kathe received the treatments listed below:      therapeutic exercises to develop strength, endurance, and ROM for 27 minutes including:  Pendulums x 10 circles, fw/bw ea  Supine dowel flexion x 15 reps   Supine ER with weightless dowel x 5 sec holds x 15 reps   Corner stretch x15 sec holds x 4 reps   Standing dowel extension (no weight) x 15   Scap AROM: elevations, retractions, rolls x 10 ea       manual therapy techniques: Friction Massage and PROM were applied to the: B shoulders for 10 minutes, including:  TFM to B biceps tendons  PROM ER with shoulders abd ~10-20 * x10       hot pack for 8 minutes to B shoulders .      Patient Education and Home Exercises "     Education provided:   - HEP   - Progress towards goals     Written Home Exercises Provided: Patient instructed to cont prior HEP.  Exercises were reviewed and Kathe was able to demonstrate them prior to the end of the session.  Kathe demonstrated good  understanding of the HEP provided. See EMR under Patient Instructions for exercises provided during therapy sessions.       Assessment     Pt would continue to benefit from skilled OT. Pt attended her first f/u post eval today. She tolerated session fairly well with c/o muscle tightness during stretches. Cues needed to relax UT muscles throughout tx.      Kathe is progressing well towards her goals and there are no updates to goals at this time. Pt prognosis is Good.     Pt will continue to benefit from skilled outpatient occupational therapy to address the deficits listed in the problem list on initial evaluation provide pt/family education and to maximize pt's level of independence in the home and community environment.     Pt's spiritual, cultural and educational needs considered and pt agreeable to plan of care and goals.    Anticipated barriers to occupational therapy: chronic nature of pain     Goals:   LTG's (8 weeks):  1)   Increase ROM to WFL degrees in bilateral shoulders to increase functional hand use for overhead reaching. ------progressing not met 6/6/2023  2)   Increase  strength 10 lbs. In both hands to grasp dumb bells. ------progressing not met 6/6/2023  4)   Decrease complaints of pain to  1 out of 10 at worst to increase functional hand use for ADL/work/leisure activities. ------progressing not met 6/6/2023  5)   Pt will return to near to prior level of function for ADLs and household management reporting I or Mod I with ADLs (dressing, feeding, grooming, toileting). ------progressing not met 6/6/2023     STG's (4 weeks)  1)   Patient to be IND with HEP and modalities for pain/edema managment. ------progressing not met 6/6/2023  2)    Increase ROM to 70% WFL degrees in bilateral shoulders to increase functional hand use for ADLs/work/leisure activities. ------progressing not met 6/6/2023  3)   Increase  strength 5 lbs. In both hands to improve functional grasp for ADLs/work/leisure activities. ------progressing not met 6/6/2023  6)   Decrease complaints of pain to  6 out of 10 at worst to increase functional hand use for ADL/work/leisure activities. ------progressing not met 6/6/2023    Plan     Updates/Grading for next session: continue as tolerated     JENNIFER Womack/L      Client Care conference completed with evaluating therapist in regards to this patients POC as evidenced by co signature of supervising therapist.

## 2023-06-19 ENCOUNTER — CLINICAL SUPPORT (OUTPATIENT)
Dept: REHABILITATION | Facility: HOSPITAL | Age: 43
End: 2023-06-19
Payer: COMMERCIAL

## 2023-06-19 DIAGNOSIS — M25.512 CHRONIC PAIN OF BOTH SHOULDERS: Primary | ICD-10-CM

## 2023-06-19 DIAGNOSIS — G89.29 CHRONIC PAIN OF BOTH SHOULDERS: Primary | ICD-10-CM

## 2023-06-19 DIAGNOSIS — M25.511 CHRONIC PAIN OF BOTH SHOULDERS: Primary | ICD-10-CM

## 2023-06-19 NOTE — PROGRESS NOTES
"[facility]  Occupational Therapy Treatment Note     Date: 6/19/2023  Name: Kathe Chamorro Tristan  Clinic Number: [id number]    Therapy Diagnosis:   Encounter Diagnosis   Name Primary?    Chronic pain of both shoulders Yes     Physician: Khloe Polo MD       Medical Diagnosis:   M25.511,G89.29,M25.512 (ICD-10-CM) - Chronic pain of both shoulders   M75.42 (ICD-10-CM) - Rotator cuff impingement syndrome of left shoulder   M75.41 (ICD-10-CM) - Rotator cuff impingement syndrome of right shoulder      Physician Orders: Eval and treat  Evaluation Date: 5/30/2023  Plan of Care Certification Date: 7/25/2023  Authorization Period: 5/16/2024  Surgery Date and Procedure: NA  Date of Return to MD: 6/21/2023     Visit #: 2 of 20  Time In: 9:45 AM  Time Out: 10:30 AM  Total Billable Time: 45 minutes     Precautions: Standard      Subjective     Pt reports: "I feel really stiff when I wake up in the morning but once I start moving around it feels better."  She was compliant with home exercise program given last session.   Response to previous treatment:first tx   Functional change: none yet    Pain: 7/10  Location: bilateral shoulder      Objective     Objective Measures updated at progress report unless specified.    Treatment     Kathe received the treatments listed below:      therapeutic exercises to develop strength, endurance, and ROM for 27 minutes including:  Pendulums x 10 circles, fw/bw ea  Supine dowel flexion x 15 reps   Supine ER with weightless dowel x 5 sec holds x 15 reps   Corner stretch x15 sec holds x 4 reps   Standing dowel extension (no weight) x 15   Scap AROM: prone letter T and low V 10x    manual therapy techniques: Friction Massage and PROM were applied to the: B shoulders for 10 minutes, including:  TFM to B biceps tendons  PROM ER with shoulders abd ~10-20 * x10       hot pack for 8 minutes to B shoulders .      Patient Education and Home Exercises     Education " provided:   - HEP   - Progress towards goals     Written Home Exercises Provided: Patient instructed to cont prior HEP.  Exercises were reviewed and Kathe was able to demonstrate them prior to the end of the session.  Kathe demonstrated good  understanding of the HEP provided. See EMR under Patient Instructions for exercises provided during therapy sessions.       Assessment     Pt would continue to benefit from skilled OT. Pt attended her first f/u post eval today. She tolerated session fairly well with c/o muscle tightness during stretches. Cues needed to relax UT muscles throughout tx.      aKthe is progressing well towards her goals and there are no updates to goals at this time. Pt prognosis is Good.     Pt will continue to benefit from skilled outpatient occupational therapy to address the deficits listed in the problem list on initial evaluation provide pt/family education and to maximize pt's level of independence in the home and community environment.     Pt's spiritual, cultural and educational needs considered and pt agreeable to plan of care and goals.    Anticipated barriers to occupational therapy: chronic nature of pain     Goals:   LTG's (8 weeks):  1)   Increase ROM to WFL degrees in bilateral shoulders to increase functional hand use for overhead reaching. ------progressing not met 6/19/2023  2)   Increase  strength 10 lbs. In both hands to grasp dumb bells. ------progressing not met 6/19/2023  4)   Decrease complaints of pain to  1 out of 10 at worst to increase functional hand use for ADL/work/leisure activities. ------progressing not met 6/19/2023  5)   Pt will return to near to prior level of function for ADLs and household management reporting I or Mod I with ADLs (dressing, feeding, grooming, toileting). ------progressing not met 6/19/2023     STG's (4 weeks)  1)   Patient to be IND with HEP and modalities for pain/edema managment. ------progressing not met 6/19/2023  2)   Increase ROM  to 70% WFL degrees in bilateral shoulders to increase functional hand use for ADLs/work/leisure activities. ------progressing not met 6/19/2023  3)   Increase  strength 5 lbs. In both hands to improve functional grasp for ADLs/work/leisure activities. ------progressing not met 6/19/2023  6)   Decrease complaints of pain to  6 out of 10 at worst to increase functional hand use for ADL/work/leisure activities. ------progressing not met 6/19/2023    Plan     Updates/Grading for next session: continue as tolerated     Grace Dubon, OT      Client Care conference completed with evaluating therapist in regards to this patients POC as evidenced by co signature of supervising therapist.

## 2023-07-03 ENCOUNTER — DOCUMENTATION ONLY (OUTPATIENT)
Dept: REHABILITATION | Facility: HOSPITAL | Age: 43
End: 2023-07-03
Payer: COMMERCIAL

## 2023-07-11 ENCOUNTER — CLINICAL SUPPORT (OUTPATIENT)
Dept: REHABILITATION | Facility: HOSPITAL | Age: 43
End: 2023-07-11
Payer: COMMERCIAL

## 2023-07-11 DIAGNOSIS — M25.511 CHRONIC PAIN OF BOTH SHOULDERS: Primary | ICD-10-CM

## 2023-07-11 DIAGNOSIS — M25.512 CHRONIC PAIN OF BOTH SHOULDERS: Primary | ICD-10-CM

## 2023-07-11 DIAGNOSIS — G89.29 CHRONIC PAIN OF BOTH SHOULDERS: Primary | ICD-10-CM

## 2023-07-11 PROCEDURE — 97110 THERAPEUTIC EXERCISES: CPT | Mod: PO

## 2023-07-11 PROCEDURE — 97140 MANUAL THERAPY 1/> REGIONS: CPT | Mod: PO

## 2023-07-11 NOTE — PROGRESS NOTES
"[facility]  Occupational Therapy Treatment Note     Date: 7/11/2023  Name: Kathe Hudson  Clinic Number: [id number]    Therapy Diagnosis:   Encounter Diagnosis   Name Primary?    Chronic pain of both shoulders Yes     Physician: Khloe Polo MD       Medical Diagnosis:   M25.511,G89.29,M25.512 (ICD-10-CM) - Chronic pain of both shoulders   M75.42 (ICD-10-CM) - Rotator cuff impingement syndrome of left shoulder   M75.41 (ICD-10-CM) - Rotator cuff impingement syndrome of right shoulder      Physician Orders: Eval and treat  Evaluation Date: 5/30/2023  Plan of Care Certification Date: 7/25/2023  Authorization Period: 5/16/2024  Surgery Date and Procedure: NA  Date of Return to MD: 6/21/2023     Visit #: 3 of 20  Time In:  445 PM  Time Out:  555 PM  Total Billable Time: 70 minutes     Precautions: Standard      Subjective     Pt reports: "I can move better."     She was compliant with home exercise program given last session.   Response to previous treatment:first tx   Functional change: none yet    Pain: 7/10  Location: bilateral shoulder      Objective     Objective Measures updated at progress report unless specified.    Treatment     Kathe received the treatments listed below:      therapeutic exercises to develop strength, endurance, and ROM for 27 minutes including:    Patient performed recip pulleys for (B) shoulder flexion, abduction, and back  IR for 14 minutes for  stretching and to increase shoulder ROM and mobility.     All (B) : towel on cervical and upper thoracic  for retraction    -Supine protraction  x 20  reps   -Supine scar retraction x 20 reps  - new HEP x 3 stretches to add to isometric HEP  - horiz ADD PROM      Manual therapy techniques: Friction Massage and PROM were applied to the: B shoulders for 20 minutes, including:  - (B) oscillations   - (B) distraction   -  scap mobs    AROM                                Left                right   Shoulder ER " 52 70   Shoulder IR T-12 L4   Shoulder horiz ADD            22                 28  SHOULDER extension       full                 40    Special Tests:        Left Right   Bicep tendonitis - +     Post session: moist heat to (B) shoulder with towel for retraction  Patient Education and Home Exercises     Education provided:   - HEP   - Progress towards goals     Written Home Exercises Provided: Patient instructed to cont prior HEP.  Exercises were reviewed and Kathe was able to demonstrate them prior to the end of the session.  Kathe demonstrated good  understanding of the HEP provided. See EMR under Patient Instructions for exercises provided during therapy sessions.       Assessment     Pt would continue to benefit from skilled OT.  Tested positive for provocation test for right shoulder, capsule tightness in posterior in (B) shoulders    Kathe is progressing well towards her goals and there are no updates to goals at this time. Pt prognosis is Good.     Pt will continue to benefit from skilled outpatient occupational therapy to address the deficits listed in the problem list on initial evaluation provide pt/family education and to maximize pt's level of independence in the home and community environment.     Pt's spiritual, cultural and educational needs considered and pt agreeable to plan of care and goals.    Anticipated barriers to occupational therapy: chronic nature of pain     Goals:   LTG's (8 weeks):  1)   Increase ROM to WFL degrees in bilateral shoulders to increase functional hand use for overhead reaching. ------progressing not met 7/11/2023  2)   Increase  strength 10 lbs. In both hands to grasp dumb bells. ------progressing not met 7/11/2023  4)   Decrease complaints of pain to  1 out of 10 at worst to increase functional hand use for ADL/work/leisure activities. ------progressing not met 7/11/2023  5)   Pt will return to near to prior level of function for ADLs and household management  reporting I or Mod I with ADLs (dressing, feeding, grooming, toileting). ------progressing not met 7/11/2023     STG's (4 weeks)  1)   Patient to be IND with HEP and modalities for pain/edema managment. ------progressing not met 7/11/2023  2)   Increase ROM to 70% WFL degrees in bilateral shoulders to increase functional hand use for ADLs/work/leisure activities. ------progressing not met 7/11/2023  3)   Increase  strength 5 lbs. In both hands to improve functional grasp for ADLs/work/leisure activities. ------progressing not met 7/11/2023  6)   Decrease complaints of pain to  6 out of 10 at worst to increase functional hand use for ADL/work/leisure activities. ------progressing not met 7/11/2023    Plan     Updates/Grading for next session: continue as tolerated     Dia Watson, OT

## 2023-07-11 NOTE — PATIENT INSTRUCTIONS
With arm out to side, resting on table, lower head toward arm, keeping trunk away from table. Hold _3___ seconds.  Repeat __10__ times. Do __5__ sessions per day.            With elbow bent and forearm on table, palm down, bend forward at waist until a stretch is felt. Hold __3__ seconds.  Repeat _10___ times. Do _5___ sessions per day.            Sit with feet flat, head centered.  Bring right arm across chest. Use other hand to bring arm closer to chest. Keep back straight, head up.  Hold __3__ seconds. Repeat _10___ times. Do _5___ sessions per day.  CAUTION: Stretch slowly and gently.             Stand holding cane behind back with both hands palm-up. Slide cane up spine toward head. Hold __3__ seconds.  Repeat __10__ times. Do 5____ sessions per day.          - We work by set appointment , 30-45 minute sessions. Please be on time or your session will be shortened.  - If you have any concerns with your schedule, please contact the  ( [phone]). I do not have any capability to make appointment adjustment.  - Children  are NOT allowed in treatment areas, unless the child is the patient. Please find childcare prior to attending your therapy session.

## 2023-07-25 ENCOUNTER — CLINICAL SUPPORT (OUTPATIENT)
Dept: REHABILITATION | Facility: HOSPITAL | Age: 43
End: 2023-07-25
Payer: COMMERCIAL

## 2023-07-25 DIAGNOSIS — M25.512 CHRONIC PAIN OF BOTH SHOULDERS: Primary | ICD-10-CM

## 2023-07-25 DIAGNOSIS — M25.511 CHRONIC PAIN OF BOTH SHOULDERS: Primary | ICD-10-CM

## 2023-07-25 DIAGNOSIS — G89.29 CHRONIC PAIN OF BOTH SHOULDERS: Primary | ICD-10-CM

## 2023-07-25 PROCEDURE — 97110 THERAPEUTIC EXERCISES: CPT | Mod: PO

## 2023-07-25 PROCEDURE — 97140 MANUAL THERAPY 1/> REGIONS: CPT | Mod: PO

## 2023-07-25 NOTE — PROGRESS NOTES
"[facility]  Occupational Therapy Treatment Note     Date: 7/25/2023  Name: Kathe Hudson  Clinic Number: [id number]    Therapy Diagnosis:   Encounter Diagnosis   Name Primary?    Chronic pain of both shoulders Yes     Physician: Khloe Polo MD       Medical Diagnosis:   M25.511,G89.29,M25.512 (ICD-10-CM) - Chronic pain of both shoulders   M75.42 (ICD-10-CM) - Rotator cuff impingement syndrome of left shoulder   M75.41 (ICD-10-CM) - Rotator cuff impingement syndrome of right shoulder      Physician Orders: Eval and treat  Evaluation Date: 5/30/2023  Plan of Care Certification Date: 7/25/2023  Authorization Period: 5/16/2024  Surgery Date and Procedure: NA  Date of Return to MD: 8/23/2023     Visit #: 5 of 20  Time In:  445 PM  Time Out:  545 PM  Total Billable Time: 60 minutes     Precautions: Standard      Subjective     Pt reports: "I can move better."     She was compliant with home exercise program given last session.   Response to previous treatment:first tx   Functional change: none yet    Pain: 7/10  Location: bilateral shoulder      Objective     Objective Measures updated at progress report unless specified.    Treatment     Kathe received the treatments listed below:      therapeutic exercises to develop strength, endurance, and ROM for 27 minutes including:    Patient performed recip pulleys for (B) shoulder flexion, abduction, and back  IR for 14 minutes for  stretching and to increase shoulder ROM and mobility.     All (B) : towel on cervical and upper thoracic  for retraction    -Supine protraction  x 20  reps using 4#wt   -Supine scar retraction x 20 reps  - sh flexion using 1#wt dowel x 20 rep  - ER with sh ADD using 2#wt dowel x 20 reps  - bicep curls x 15 reps using 4#wt      Manual therapy techniques: Friction Massage and PROM were applied to the: B shoulders for 10 minutes, including:  - (B) oscillations   - (B) distraction   -  scap mobs      Shoulder " ROM. Measured in degrees.    7/25/2023 7/25/23     Left Right   Shoulder Flexion Full(+20) 153(+9)   Shoulder Extension 47(+11) 47(+12)   Shoulder Abduction 158(+3) 140(-5)   Shoulder ER 66 67(+27)   Shoulder IR L1 L-5    Shoulder horiz ADD            30                 25    Patient Education and Home Exercises     Education provided:   - HEP   - Progress towards goals     Written Home Exercises Provided: Patient instructed to cont prior HEP.  Exercises were reviewed and Kathe was able to demonstrate them prior to the end of the session.  Kathe demonstrated good  understanding of the HEP provided. See EMR under Patient Instructions for exercises provided during therapy sessions.       Assessment     Pt would continue to benefit from skilled OT.  Tested positive for provocation test for right shoulder, capsule tightness in posterior in (B) shoulders    Kathe is progressing well towards her goals and there are no updates to goals at this time. Pt prognosis is Good.     Pt will continue to benefit from skilled outpatient occupational therapy to address the deficits listed in the problem list on initial evaluation provide pt/family education and to maximize pt's level of independence in the home and community environment.     Pt's spiritual, cultural and educational needs considered and pt agreeable to plan of care and goals.    Anticipated barriers to occupational therapy: chronic nature of pain     Goals:   LTG's (8 weeks):  1)   Increase ROM to WFL degrees in bilateral shoulders to increase functional hand use for overhead reaching. ------progressing not met 7/25/2023  2)   Increase  strength 10 lbs. In both hands to grasp dumb bells. ------progressing not met 7/25/2023  4)   Decrease complaints of pain to  1 out of 10 at worst to increase functional hand use for ADL/work/leisure activities. ------progressing not met 7/25/2023  5)   Pt will return to near to prior level of function for ADLs and household  management reporting I or Mod I with ADLs (dressing, feeding, grooming, toileting). ------progressing not met 7/25/2023     STG's (4 weeks)  1)   Patient to be IND with HEP and modalities for pain/edema managment. ------progressing not met 7/25/2023  2)   Increase ROM to 70% WFL degrees in bilateral shoulders to increase functional hand use for ADLs/work/leisure activities. ------progressing not met 7/25/2023  3)   Increase  strength 5 lbs. In both hands to improve functional grasp for ADLs/work/leisure activities. ------progressing not met 7/25/2023  6)   Decrease complaints of pain to  6 out of 10 at worst to increase functional hand use for ADL/work/leisure activities. ------progressing not met 7/25/2023    Plan     Updates/Grading for next session: continue as tolerated     Dia Watson, OT

## 2023-08-26 ENCOUNTER — PATIENT MESSAGE (OUTPATIENT)
Dept: CARDIOLOGY | Facility: CLINIC | Age: 43
End: 2023-08-26
Payer: COMMERCIAL

## 2023-08-28 NOTE — TELEPHONE ENCOUNTER
Orders were originally for VQ scan and if negative Stress echo. Orders have since . Please advise.

## 2023-08-30 ENCOUNTER — OFFICE VISIT (OUTPATIENT)
Dept: INTERNAL MEDICINE | Facility: CLINIC | Age: 43
End: 2023-08-30
Payer: COMMERCIAL

## 2023-08-30 VITALS
DIASTOLIC BLOOD PRESSURE: 70 MMHG | BODY MASS INDEX: 21.9 KG/M2 | HEART RATE: 70 BPM | OXYGEN SATURATION: 99 % | SYSTOLIC BLOOD PRESSURE: 126 MMHG | RESPIRATION RATE: 16 BRPM | WEIGHT: 139.56 LBS | HEIGHT: 67 IN

## 2023-08-30 DIAGNOSIS — R30.0 DYSURIA: Primary | ICD-10-CM

## 2023-08-30 DIAGNOSIS — N89.8 VAGINAL DISCHARGE: ICD-10-CM

## 2023-08-30 LAB
BILIRUB SERPL-MCNC: NEGATIVE MG/DL
BLOOD URINE, POC: NEGATIVE
CLARITY, POC UA: CLEAR
COLOR, POC UA: YELLOW
GLUCOSE UR QL STRIP: NORMAL
KETONES UR QL STRIP: NEGATIVE
LEUKOCYTE ESTERASE URINE, POC: NORMAL
NITRITE, POC UA: NEGATIVE
PH, POC UA: 5
PROTEIN, POC: NORMAL
SPECIFIC GRAVITY, POC UA: 1.01
UROBILINOGEN, POC UA: NORMAL

## 2023-08-30 PROCEDURE — 87591 N.GONORRHOEAE DNA AMP PROB: CPT | Performed by: STUDENT IN AN ORGANIZED HEALTH CARE EDUCATION/TRAINING PROGRAM

## 2023-08-30 PROCEDURE — 3074F SYST BP LT 130 MM HG: CPT | Mod: CPTII,S$GLB,, | Performed by: STUDENT IN AN ORGANIZED HEALTH CARE EDUCATION/TRAINING PROGRAM

## 2023-08-30 PROCEDURE — 99213 OFFICE O/P EST LOW 20 MIN: CPT | Mod: S$GLB,,, | Performed by: STUDENT IN AN ORGANIZED HEALTH CARE EDUCATION/TRAINING PROGRAM

## 2023-08-30 PROCEDURE — 3008F PR BODY MASS INDEX (BMI) DOCUMENTED: ICD-10-PCS | Mod: CPTII,S$GLB,, | Performed by: STUDENT IN AN ORGANIZED HEALTH CARE EDUCATION/TRAINING PROGRAM

## 2023-08-30 PROCEDURE — 99999 PR PBB SHADOW E&M-EST. PATIENT-LVL IV: ICD-10-PCS | Mod: PBBFAC,,, | Performed by: STUDENT IN AN ORGANIZED HEALTH CARE EDUCATION/TRAINING PROGRAM

## 2023-08-30 PROCEDURE — 3008F BODY MASS INDEX DOCD: CPT | Mod: CPTII,S$GLB,, | Performed by: STUDENT IN AN ORGANIZED HEALTH CARE EDUCATION/TRAINING PROGRAM

## 2023-08-30 PROCEDURE — 81002 POCT URINE DIPSTICK WITHOUT MICROSCOPE: ICD-10-PCS | Mod: S$GLB,,, | Performed by: STUDENT IN AN ORGANIZED HEALTH CARE EDUCATION/TRAINING PROGRAM

## 2023-08-30 PROCEDURE — 1159F PR MEDICATION LIST DOCUMENTED IN MEDICAL RECORD: ICD-10-PCS | Mod: CPTII,S$GLB,, | Performed by: STUDENT IN AN ORGANIZED HEALTH CARE EDUCATION/TRAINING PROGRAM

## 2023-08-30 PROCEDURE — 3078F DIAST BP <80 MM HG: CPT | Mod: CPTII,S$GLB,, | Performed by: STUDENT IN AN ORGANIZED HEALTH CARE EDUCATION/TRAINING PROGRAM

## 2023-08-30 PROCEDURE — 99999 PR PBB SHADOW E&M-EST. PATIENT-LVL IV: CPT | Mod: PBBFAC,,, | Performed by: STUDENT IN AN ORGANIZED HEALTH CARE EDUCATION/TRAINING PROGRAM

## 2023-08-30 PROCEDURE — 1159F MED LIST DOCD IN RCRD: CPT | Mod: CPTII,S$GLB,, | Performed by: STUDENT IN AN ORGANIZED HEALTH CARE EDUCATION/TRAINING PROGRAM

## 2023-08-30 PROCEDURE — 3074F PR MOST RECENT SYSTOLIC BLOOD PRESSURE < 130 MM HG: ICD-10-PCS | Mod: CPTII,S$GLB,, | Performed by: STUDENT IN AN ORGANIZED HEALTH CARE EDUCATION/TRAINING PROGRAM

## 2023-08-30 PROCEDURE — 99213 PR OFFICE/OUTPT VISIT, EST, LEVL III, 20-29 MIN: ICD-10-PCS | Mod: S$GLB,,, | Performed by: STUDENT IN AN ORGANIZED HEALTH CARE EDUCATION/TRAINING PROGRAM

## 2023-08-30 PROCEDURE — 81002 URINALYSIS NONAUTO W/O SCOPE: CPT | Mod: S$GLB,,, | Performed by: STUDENT IN AN ORGANIZED HEALTH CARE EDUCATION/TRAINING PROGRAM

## 2023-08-30 PROCEDURE — 3078F PR MOST RECENT DIASTOLIC BLOOD PRESSURE < 80 MM HG: ICD-10-PCS | Mod: CPTII,S$GLB,, | Performed by: STUDENT IN AN ORGANIZED HEALTH CARE EDUCATION/TRAINING PROGRAM

## 2023-08-30 NOTE — PROGRESS NOTES
Subjective     Patient ID: Kathe Hudson is a 43 y.o. female.    Chief Complaint: Vaginal Pain, STD TEST, Urinary Tract Infection, and Leg Pain    Vaginal Pain  The patient's primary symptoms include vaginal discharge. Associated symptoms include dysuria. Pertinent negatives include no abdominal pain, chills, constipation, diarrhea, fever, headaches, hematuria or sore throat.   Urinary Tract Infection   Pertinent negatives include no chills, hematuria or constipation.   Leg Pain       Patient is a 42 yo female who is here for dysuria, vaginal pain which started on friday. She went to an urgent care on monday for this. They sent out some tests and prescribed 4 medications. She started taking the antibiotics but not the other 3 meds. They prescribed macrobid, flagyl, pyridium  and fluconazole she took fluconazole and macrobid. Last sexual intercourse was a week ago. Did not use protection. The dysuria is not as bad as it was. Admits to thin vaginal discharge with foul odor.   Review of Systems   Constitutional:  Negative for chills and fever.   HENT:  Negative for rhinorrhea and sore throat.    Respiratory:  Negative for cough, chest tightness and shortness of breath.    Cardiovascular:  Negative for chest pain.   Gastrointestinal:  Negative for abdominal pain, constipation and diarrhea.   Genitourinary:  Positive for dysuria, vaginal discharge and vaginal pain. Negative for hematuria.   Musculoskeletal:  Positive for leg pain.   Neurological:  Negative for dizziness, light-headedness and headaches.          Objective     Physical Exam  Vitals and nursing note reviewed.   Constitutional:       Appearance: Normal appearance.   Eyes:      Conjunctiva/sclera: Conjunctivae normal.   Cardiovascular:      Rate and Rhythm: Normal rate and regular rhythm.   Pulmonary:      Effort: Pulmonary effort is normal. No respiratory distress.   Musculoskeletal:      Cervical back: Normal range of motion.      Right lower leg:  No edema.      Left lower leg: No edema.   Skin:     General: Skin is warm.   Neurological:      Mental Status: She is alert and oriented to person, place, and time.   Psychiatric:         Mood and Affect: Mood normal.         Behavior: Behavior normal.            Assessment and Plan     1. Dysuria  Assessment & Plan:  Dysuria. Already on macrobid. disptick with + wbc. Asked her to continue macrobid. Saw protein in urine. Asked her to f/u with pcp and get her urine checked again.    Orders:  -     POCT URINE DIPSTICK WITHOUT MICROSCOPE  -     C. trachomatis/N. gonorrhoeae by AMP DNA [facility]; Urine    2. Vaginal discharge  Assessment & Plan:  Seems to be consistent with BV. She already has flagyl, asked her to start taking it. Since she did have recent unprotected sexual encounter, will test her for GC as well. Asked her to reach out to me if symptoms do not resolve after finishing treatment.

## 2023-08-30 NOTE — ASSESSMENT & PLAN NOTE
Seems to be consistent with BV. She already has flagyl, asked her to start taking it. Since she did have recent unprotected sexual encounter, will test her for GC as well. Asked her to reach out to me if symptoms do not resolve after finishing treatment.

## 2023-08-30 NOTE — ASSESSMENT & PLAN NOTE
Dysuria. Already on macrobid. disptick with + wbc. Asked her to continue macrobid. Saw protein in urine. Asked her to f/u with pcp and get her urine checked again.   I have personally seen and examined this patient.  I have fully participated in the care of this patient. I have reviewed all pertinent clinical information, including history, physical exam, plan and the Resident’s note and agree except as noted.

## 2023-09-01 ENCOUNTER — TELEPHONE (OUTPATIENT)
Dept: INTERNAL MEDICINE | Facility: CLINIC | Age: 43
End: 2023-09-01
Payer: COMMERCIAL

## 2023-09-01 ENCOUNTER — PATIENT MESSAGE (OUTPATIENT)
Dept: INTERNAL MEDICINE | Facility: CLINIC | Age: 43
End: 2023-09-01
Payer: COMMERCIAL

## 2023-09-01 LAB
C TRACH DNA SPEC QL NAA+PROBE: NOT DETECTED
N GONORRHOEA DNA SPEC QL NAA+PROBE: NOT DETECTED

## 2023-09-01 NOTE — TELEPHONE ENCOUNTER
----- Message from Emily Brush MD sent at 9/1/2023 11:30 AM CDT -----  Pls let patient know that she is neg for chlamydia and gonorrhea

## 2023-09-01 NOTE — TELEPHONE ENCOUNTER
Hey DR, I called the lab to check on the PT sample. The lab staff stated the PT sample results are not in. Lab statedThey are still testing and will have the results in a few day per the lab.

## 2023-09-07 ENCOUNTER — PATIENT MESSAGE (OUTPATIENT)
Dept: REHABILITATION | Facility: HOSPITAL | Age: 43
End: 2023-09-07
Payer: COMMERCIAL

## 2023-09-22 ENCOUNTER — PATIENT MESSAGE (OUTPATIENT)
Dept: INTERNAL MEDICINE | Facility: CLINIC | Age: 43
End: 2023-09-22
Payer: COMMERCIAL

## 2023-09-22 ENCOUNTER — PATIENT MESSAGE (OUTPATIENT)
Dept: ORTHOPEDICS | Facility: CLINIC | Age: 43
End: 2023-09-22
Payer: COMMERCIAL

## 2023-09-22 DIAGNOSIS — M79.604 LEG PAIN, ANTERIOR, RIGHT: ICD-10-CM

## 2023-09-22 DIAGNOSIS — N89.8 VAGINAL DISCHARGE: Primary | ICD-10-CM

## 2023-09-25 ENCOUNTER — TELEPHONE (OUTPATIENT)
Dept: ORTHOPEDICS | Facility: CLINIC | Age: 43
End: 2023-09-25
Payer: COMMERCIAL

## 2023-09-25 NOTE — TELEPHONE ENCOUNTER
Patient wanted to see gyn for the vaginal symptoms and ortho for the leg pain she is still having. Placed referral to gyn and sports med

## 2023-09-25 NOTE — TELEPHONE ENCOUNTER
----- Message from Cathryn SAUNDERS May sent at 9/25/2023  3:19 PM CDT -----  Regarding: appt  Contact: [phone]  Pt stated she will be running late for appt dated 9/27. Pt will keep appt date if that is ok. Pls call to discuss. Pt sent a portal message, with no response.

## 2023-09-27 ENCOUNTER — OFFICE VISIT (OUTPATIENT)
Dept: ORTHOPEDICS | Facility: CLINIC | Age: 43
End: 2023-09-27
Payer: COMMERCIAL

## 2023-09-27 VITALS
SYSTOLIC BLOOD PRESSURE: 109 MMHG | DIASTOLIC BLOOD PRESSURE: 74 MMHG | WEIGHT: 139.13 LBS | HEART RATE: 70 BPM | BODY MASS INDEX: 21.84 KG/M2 | HEIGHT: 67 IN

## 2023-09-27 DIAGNOSIS — M54.2 NECK PAIN: ICD-10-CM

## 2023-09-27 DIAGNOSIS — G89.29 CHRONIC PAIN OF BOTH SHOULDERS: ICD-10-CM

## 2023-09-27 DIAGNOSIS — M75.41 ROTATOR CUFF IMPINGEMENT SYNDROME OF RIGHT SHOULDER: Primary | ICD-10-CM

## 2023-09-27 DIAGNOSIS — M75.42 ROTATOR CUFF IMPINGEMENT SYNDROME OF LEFT SHOULDER: ICD-10-CM

## 2023-09-27 DIAGNOSIS — M25.511 CHRONIC PAIN OF BOTH SHOULDERS: ICD-10-CM

## 2023-09-27 DIAGNOSIS — M25.512 CHRONIC PAIN OF BOTH SHOULDERS: ICD-10-CM

## 2023-09-27 PROCEDURE — 3074F SYST BP LT 130 MM HG: CPT | Mod: CPTII,S$GLB,, | Performed by: PHYSICIAN ASSISTANT

## 2023-09-27 PROCEDURE — 1159F PR MEDICATION LIST DOCUMENTED IN MEDICAL RECORD: ICD-10-PCS | Mod: CPTII,S$GLB,, | Performed by: PHYSICIAN ASSISTANT

## 2023-09-27 PROCEDURE — 99213 PR OFFICE/OUTPT VISIT, EST, LEVL III, 20-29 MIN: ICD-10-PCS | Mod: S$GLB,,, | Performed by: PHYSICIAN ASSISTANT

## 2023-09-27 PROCEDURE — 1160F PR REVIEW ALL MEDS BY PRESCRIBER/CLIN PHARMACIST DOCUMENTED: ICD-10-PCS | Mod: CPTII,S$GLB,, | Performed by: PHYSICIAN ASSISTANT

## 2023-09-27 PROCEDURE — 3078F PR MOST RECENT DIASTOLIC BLOOD PRESSURE < 80 MM HG: ICD-10-PCS | Mod: CPTII,S$GLB,, | Performed by: PHYSICIAN ASSISTANT

## 2023-09-27 PROCEDURE — 99999 PR PBB SHADOW E&M-EST. PATIENT-LVL IV: CPT | Mod: PBBFAC,,, | Performed by: PHYSICIAN ASSISTANT

## 2023-09-27 PROCEDURE — 3078F DIAST BP <80 MM HG: CPT | Mod: CPTII,S$GLB,, | Performed by: PHYSICIAN ASSISTANT

## 2023-09-27 PROCEDURE — 3074F PR MOST RECENT SYSTOLIC BLOOD PRESSURE < 130 MM HG: ICD-10-PCS | Mod: CPTII,S$GLB,, | Performed by: PHYSICIAN ASSISTANT

## 2023-09-27 PROCEDURE — 3008F BODY MASS INDEX DOCD: CPT | Mod: CPTII,S$GLB,, | Performed by: PHYSICIAN ASSISTANT

## 2023-09-27 PROCEDURE — 3008F PR BODY MASS INDEX (BMI) DOCUMENTED: ICD-10-PCS | Mod: CPTII,S$GLB,, | Performed by: PHYSICIAN ASSISTANT

## 2023-09-27 PROCEDURE — 1159F MED LIST DOCD IN RCRD: CPT | Mod: CPTII,S$GLB,, | Performed by: PHYSICIAN ASSISTANT

## 2023-09-27 PROCEDURE — 99213 OFFICE O/P EST LOW 20 MIN: CPT | Mod: S$GLB,,, | Performed by: PHYSICIAN ASSISTANT

## 2023-09-27 PROCEDURE — 99999 PR PBB SHADOW E&M-EST. PATIENT-LVL IV: ICD-10-PCS | Mod: PBBFAC,,, | Performed by: PHYSICIAN ASSISTANT

## 2023-09-27 PROCEDURE — 1160F RVW MEDS BY RX/DR IN RCRD: CPT | Mod: CPTII,S$GLB,, | Performed by: PHYSICIAN ASSISTANT

## 2023-09-27 NOTE — PROGRESS NOTES
Subjective:      Patient ID: Kathe Hudson is a 43 y.o. female.    Chief Complaint: Pain of the Left Shoulder and Pain of the Right Shoulder      44yo female follow up bilateral shoulder pain/impingement syndrome. She recently completed physical therapy. States she noted significant improvement in left shoulder pain but still has baseline symptoms in the right shoulder. Her pain is laterally and superiorly. Symptoms worse with overhead motion and abduction. Unable to sleep on right side. Now noting neck pain and paresthesias in both hands. She had an injection in the past which helped for about 1-2 weeks. 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         Review of Systems   Constitutional: Negative for chills and fever.   Cardiovascular:  Negative for chest pain.   Respiratory:  Negative for cough.    Hematologic/Lymphatic: Does not bruise/bleed easily.   Skin:  Negative for poor wound healing and rash.   Musculoskeletal:  Positive for joint pain, myalgias and stiffness.   Gastrointestinal:  Negative for abdominal pain.   Genitourinary:  Negative for bladder incontinence.   Neurological:  Negative for dizziness, loss of balance and weakness.   Psychiatric/Behavioral:  Negative for altered mental status.        Review of patient's allergies indicates:  No Known Allergies     Current Outpatient Medications   Medication Sig Dispense Refill    acetaminophen (TYLENOL) 325 MG tablet Take 325 mg by mouth every 6 (six) hours as needed for Pain.      albuterol (PROVENTIL HFA) 90 mcg/actuation inhaler Inhale 2 puffs into the lungs every 6 (six) hours as needed for Wheezing. Rescue 18 g 0    cetirizine (ZYRTEC) 10 MG tablet Take 1 tablet (10 mg total) by mouth once daily. 30 tablet 11    diclofenac sodium (VOLTAREN) 1 % Gel Apply 2 g topically 3 (three) times daily. 50 g 2    fluticasone propionate (FLONASE) 50 mcg/actuation nasal spray 1 spray (50 mcg total) by Each Nostril route once daily. 16 g 0     "ipratropium (ATROVENT) 42 mcg (0.06 %) nasal spray 2 sprays by Nasal route 4 (four) times daily. 15 mL 0    LATISSE 0.03 % ophthalmic solution Apply 1 drop via applicator once a day      promethazine-dextromethorphan (PROMETHAZINE-DM) 6.25-15 mg/5 mL Syrp Take 5 mLs by mouth nightly as needed (for cough. Do not take with any other cough syrups. Can make you drowsy.). 118 mL 0    TAZORAC 0.1 % cream Apply topically as needed.      omeprazole (PRILOSEC) 40 MG capsule Take 1 capsule (40 mg total) by mouth once daily. (Patient not taking: Reported on 9/14/2022) 30 capsule 2     No current facility-administered medications for this visit.        The patient's relevant past medical, surgical, and social history was reviewed in Epic.       Objective:      VITAL SIGNS: /74   Pulse 70   Ht 5' 7" (1.702 m)   Wt 63.1 kg (139 lb 1.8 oz)   BMI 21.79 kg/m²     General    Nursing note and vitals reviewed.  Constitutional: She is oriented to person, place, and time. She appears well-developed and well-nourished.   Neurological: She is alert and oriented to person, place, and time.         Right Shoulder Exam     Tenderness   The patient is tender to palpation of the greater tuberosity.    Range of Motion   Active abduction:  160   Forward Flexion:  170   External Rotation 0 degrees:  80     Tests & Signs   Hogan test: positive  Impingement: positive    Other   Sensation: normal    Left Shoulder Exam     Tenderness   The patient is tender to palpation of the greater tuberosity.    Range of Motion   Active abduction:  170   Forward Flexion:  180   External Rotation 0 degrees:  90     Tests & Signs   Hogan test: positive    Other   Sensation: normal              Assessment:       1. Rotator cuff impingement syndrome of right shoulder    2. Neck pain    3. Chronic pain of both shoulders    4. Rotator cuff impingement syndrome of left shoulder          Plan:         Kathe was seen today for pain and pain.    Diagnoses and " all orders for this visit:    Rotator cuff impingement syndrome of right shoulder  -     MRI Shoulder Without Contrast Right; Future    Neck pain  -     Ambulatory referral/consult to Sports Medicine    Chronic pain of both shoulders  -     MRI Shoulder Without Contrast Right; Future  -     MRI Shoulder Without Contrast Left; Future    Rotator cuff impingement syndrome of left shoulder  -     MRI Shoulder Without Contrast Left; Future    Bilateral shoulder impingement, R>L. Will order bilateral shoulder MRI for further evaluation. Could be possible cervical etio? Will set her up with MRI result audio visit with Dr. Denton to discuss results once MRI is completed.     Diagnoses and plan discussed with the patient, as well as the expected course and duration of his symptoms.  All questions and concerns were addressed prior to the end of the visit.   Instructed patient to call office if they have any future questions/concerns or to schedule apt. Patient will return to see me if symptoms worsen or fail to improve    Note dictated with voice recognition software, please excuse any grammatical errors.        Linda Enrique PA-C   09/27/2023

## 2023-09-29 ENCOUNTER — TELEPHONE (OUTPATIENT)
Dept: OBSTETRICS AND GYNECOLOGY | Facility: CLINIC | Age: 43
End: 2023-09-29
Payer: COMMERCIAL

## 2023-09-29 ENCOUNTER — PATIENT MESSAGE (OUTPATIENT)
Dept: OBSTETRICS AND GYNECOLOGY | Facility: CLINIC | Age: 43
End: 2023-09-29
Payer: COMMERCIAL

## 2023-09-29 NOTE — TELEPHONE ENCOUNTER
[facility]  Name of Who is Calling: Kathe Hudson       What is the request in detail: Would like to speak with staff in regards to an appointment. Referral placed by Dr. Brush for N89.8 (ICD-10-CM) - Vaginal discharge. Please assist with scheduling           Can the clinic reply by [facility]: no       What Number to Call Back if not in [facility]: [phone]

## 2023-10-04 ENCOUNTER — HOSPITAL ENCOUNTER (OUTPATIENT)
Dept: RADIOLOGY | Facility: HOSPITAL | Age: 43
Discharge: HOME | End: 2023-10-04
Attending: PHYSICIAN ASSISTANT
Payer: COMMERCIAL

## 2023-10-04 DIAGNOSIS — M25.511 CHRONIC PAIN OF BOTH SHOULDERS: ICD-10-CM

## 2023-10-04 DIAGNOSIS — M25.512 CHRONIC PAIN OF BOTH SHOULDERS: ICD-10-CM

## 2023-10-04 DIAGNOSIS — G89.29 CHRONIC PAIN OF BOTH SHOULDERS: ICD-10-CM

## 2023-10-04 DIAGNOSIS — M75.41 ROTATOR CUFF IMPINGEMENT SYNDROME OF RIGHT SHOULDER: ICD-10-CM

## 2023-10-04 PROCEDURE — 73221 MRI JOINT UPR EXTREM W/O DYE: CPT | Mod: TC,RT

## 2023-10-04 PROCEDURE — 73221 MRI JOINT UPR EXTREM W/O DYE: CPT | Mod: 26,RT,, | Performed by: RADIOLOGY

## 2023-10-04 PROCEDURE — 73221 MRI SHOULDER WITHOUT CONTRAST RIGHT: ICD-10-PCS | Mod: 26,RT,, | Performed by: RADIOLOGY

## 2023-10-10 ENCOUNTER — TELEPHONE (OUTPATIENT)
Dept: ORTHOPEDICS | Facility: CLINIC | Age: 43
End: 2023-10-10
Payer: COMMERCIAL

## 2023-10-10 NOTE — TELEPHONE ENCOUNTER
----- Message from Marni Corral sent at 10/10/2023  8:29 AM CDT -----  Type:  Patient Returning Call    Who Called:pt   Who Left Message for Patient:office  Does the patient know what this is regarding?:n/a  Would the patient rather a call back or a response via [employer]? call  Best Call Back Number:[phone]  Additional Information:

## 2023-10-11 ENCOUNTER — OFFICE VISIT (OUTPATIENT)
Dept: ORTHOPEDICS | Facility: CLINIC | Age: 43
End: 2023-10-11
Payer: COMMERCIAL

## 2023-10-11 DIAGNOSIS — M67.813 TENDINOSIS OF RIGHT ROTATOR CUFF: ICD-10-CM

## 2023-10-11 DIAGNOSIS — M75.41 ROTATOR CUFF IMPINGEMENT SYNDROME OF RIGHT SHOULDER: Primary | ICD-10-CM

## 2023-10-11 DIAGNOSIS — M75.101 TEAR OF RIGHT SUPRASPINATUS TENDON: ICD-10-CM

## 2023-10-11 PROCEDURE — 99213 OFFICE O/P EST LOW 20 MIN: CPT | Mod: 93,,, | Performed by: ORTHOPAEDIC SURGERY

## 2023-10-11 PROCEDURE — 99213 PR OFFICE/OUTPT VISIT, EST, LEVL III, 20-29 MIN: ICD-10-PCS | Mod: 93,,, | Performed by: ORTHOPAEDIC SURGERY

## 2023-10-11 NOTE — PROGRESS NOTES
[facility] Orthopedics and Sports Medicine  [facility]    Audio Only Telehealth Visit  10/11/2023     Diagnosis: right supraspinatus tendon tear, rotator cuff impingement syndrome    The patient location is: [place]   The chief complaint leading to consultation is: right shoulder pain  Visit type: Virtual visit with audio only (telephone)  Total time spent: 18 minutes     The reason for the audio only service rather than synchronous audio and video virtual visit was related to technical difficulties or patient preference/necessity.     Each patient to whom I provide medical services by telemedicine is:  (1) informed of the relationship between the physician and patient and the respective role of any other health care provider with respect to management of the patient; and (2) notified that they may decline to receive medical services by telemedicine and may withdraw from such care at any time. Patient verbally consented to receive this service via voice-only telephone call.    Subjective:      Kathe Hudson is a 43 y.o. female who follows up for right shoulder pain.  She continues with right shoulder pain that limits her daily activities including ability to do her job normally.  She has tried physical therapy and had minimal improvement in her symptoms.  She had an injection by another provider previously which did not improve symptoms.  She was seen by another provider in my practice recently and sent for an MRI and follows up today for results.         Objective:      Imaging:  MRI of the right shoulder taken taken  10/4/2023  was personally reviewed from the [facility] EMR.  Multiple T1 and T2 sequences including axial, coronal, and sagittal views were reviewed.  No acute fractures or dislocations are noted in these images.  There is partial tearing of the supraspinatus tendon articular surface.  There is biceps tendonitis noted.  There is some degenerative change in the AC  joint, glenohumeral cartilage, and labral tissue noted.  There is tendinosis in the infraspinatus and subscapularis tendons noted.         Assessment:       Kathe Hudson is a 43 y.o. female seen today via a telemedicine visit. The primary encounter diagnosis was Rotator cuff impingement syndrome of right shoulder. Diagnoses of Tear of right supraspinatus tendon and Tendinosis of right rotator cuff were also pertinent to this visit.  Further work-up is recommended at this time in the form of a clinical examination.  The patient is not improving with nonsurgical treatment and I have not examined the patient in many months.  Given the appearance of the MRI and her failure to improve with therapy, would recommend evaluation of the shoulder function in the clinical setting.  The patient will therefore schedule an appointment at her convenience. The natural history and expected course discussed with patient. Various treatment options were discussed, including their risks and benefits. All of the patient's questions were answered.     Plan:      Tylenol 650mg TID, PRN pain.  Follow up as needed if symptoms worsen.         Bob Denton IV, MD   of Clinical Orthopedics  Department of Orthopedic Surgery  [facility]  Office: [phone]  Website: [email or web address]    ---------------------------------------  No orders of the defined types were placed in this encounter.       This service was not originating from a related E/M service provided within the previous 7 days nor will  to an E/M service or procedure within the next 24 hours or my soonest available appointment.  Prevailing standard of care was able to be met in this audio-only visit.

## 2023-10-13 ENCOUNTER — TELEPHONE (OUTPATIENT)
Dept: SPORTS MEDICINE | Facility: CLINIC | Age: 43
End: 2023-10-13
Payer: COMMERCIAL

## 2023-10-13 NOTE — TELEPHONE ENCOUNTER
Called and left voicemail for patient to contact the office in regards to appointment that is schedule with Dr. Youngblood on 10/25/23

## 2023-10-16 ENCOUNTER — TELEPHONE (OUTPATIENT)
Dept: SPORTS MEDICINE | Facility: CLINIC | Age: 43
End: 2023-10-16
Payer: COMMERCIAL

## 2023-10-16 NOTE — TELEPHONE ENCOUNTER
Called and spoke to patient. Dr. Youngblood has no sooner appointments.  Patient is seeking second opinion for her MRI

## 2023-10-16 NOTE — TELEPHONE ENCOUNTER
----- Message from Maribel Lowery sent at 10/16/2023 10:13 AM CDT -----  Regarding: Sooner appt needed  Contact: [phone]  Pt requesting a call back to see if she can be seen today as her appt is scheduled for 10/25/23 but pt states she is in pain.  Please call to discuss further as I checked for availability and did not see any appt for today.

## 2023-11-18 ENCOUNTER — PATIENT MESSAGE (OUTPATIENT)
Dept: INTERNAL MEDICINE | Facility: CLINIC | Age: 43
End: 2023-11-18
Payer: COMMERCIAL

## 2023-11-30 ENCOUNTER — OFFICE VISIT (OUTPATIENT)
Dept: ORTHOPEDICS | Facility: CLINIC | Age: 43
End: 2023-11-30
Payer: COMMERCIAL

## 2023-11-30 VITALS
SYSTOLIC BLOOD PRESSURE: 110 MMHG | DIASTOLIC BLOOD PRESSURE: 80 MMHG | HEART RATE: 70 BPM | BODY MASS INDEX: 21.84 KG/M2 | HEIGHT: 67 IN | WEIGHT: 139.13 LBS

## 2023-11-30 DIAGNOSIS — M67.813 TENDINOSIS OF RIGHT ROTATOR CUFF: ICD-10-CM

## 2023-11-30 DIAGNOSIS — M75.21 BICEPS TENDONITIS, RIGHT: ICD-10-CM

## 2023-11-30 DIAGNOSIS — M75.42 ROTATOR CUFF IMPINGEMENT SYNDROME OF LEFT SHOULDER: ICD-10-CM

## 2023-11-30 DIAGNOSIS — M75.41 ROTATOR CUFF IMPINGEMENT SYNDROME OF RIGHT SHOULDER: Primary | ICD-10-CM

## 2023-11-30 DIAGNOSIS — M75.101 TEAR OF RIGHT SUPRASPINATUS TENDON: ICD-10-CM

## 2023-11-30 PROCEDURE — 3074F SYST BP LT 130 MM HG: CPT | Mod: CPTII,S$GLB,, | Performed by: ORTHOPAEDIC SURGERY

## 2023-11-30 PROCEDURE — 3079F PR MOST RECENT DIASTOLIC BLOOD PRESSURE 80-89 MM HG: ICD-10-PCS | Mod: CPTII,S$GLB,, | Performed by: ORTHOPAEDIC SURGERY

## 2023-11-30 PROCEDURE — 1159F MED LIST DOCD IN RCRD: CPT | Mod: CPTII,S$GLB,, | Performed by: ORTHOPAEDIC SURGERY

## 2023-11-30 PROCEDURE — 1159F PR MEDICATION LIST DOCUMENTED IN MEDICAL RECORD: ICD-10-PCS | Mod: CPTII,S$GLB,, | Performed by: ORTHOPAEDIC SURGERY

## 2023-11-30 PROCEDURE — 99999 PR PBB SHADOW E&M-EST. PATIENT-LVL IV: ICD-10-PCS | Mod: PBBFAC,,, | Performed by: ORTHOPAEDIC SURGERY

## 2023-11-30 PROCEDURE — 99214 OFFICE O/P EST MOD 30 MIN: CPT | Mod: S$GLB,,, | Performed by: ORTHOPAEDIC SURGERY

## 2023-11-30 PROCEDURE — 99214 PR OFFICE/OUTPT VISIT, EST, LEVL IV, 30-39 MIN: ICD-10-PCS | Mod: S$GLB,,, | Performed by: ORTHOPAEDIC SURGERY

## 2023-11-30 PROCEDURE — 99999 PR PBB SHADOW E&M-EST. PATIENT-LVL IV: CPT | Mod: PBBFAC,,, | Performed by: ORTHOPAEDIC SURGERY

## 2023-11-30 PROCEDURE — 3008F BODY MASS INDEX DOCD: CPT | Mod: CPTII,S$GLB,, | Performed by: ORTHOPAEDIC SURGERY

## 2023-11-30 PROCEDURE — 3074F PR MOST RECENT SYSTOLIC BLOOD PRESSURE < 130 MM HG: ICD-10-PCS | Mod: CPTII,S$GLB,, | Performed by: ORTHOPAEDIC SURGERY

## 2023-11-30 PROCEDURE — 3079F DIAST BP 80-89 MM HG: CPT | Mod: CPTII,S$GLB,, | Performed by: ORTHOPAEDIC SURGERY

## 2023-11-30 PROCEDURE — 3008F PR BODY MASS INDEX (BMI) DOCUMENTED: ICD-10-PCS | Mod: CPTII,S$GLB,, | Performed by: ORTHOPAEDIC SURGERY

## 2023-11-30 NOTE — PROGRESS NOTES
[facility], Orthopedics and Sports Medicine  [facility]    Established Patient Shoulder Office Visit  11/30/2023     Diagnosis:  Right supraspinatus tendon tear, articular surface partial   Right rotator cuff impingement  Right rotator cuff tendinosis  Right biceps tendinosis     Procedure:   (1/6/2023) right GH CSI (other provider)    Subjective:      Kathe Hudson is a 43 y.o. female who returns for follow up treatment of the right shoulder problem.  Right shoulder is feeling somewhat better but has not been as active.  Difficulty holding body weight up with push ups due to right arm weakness. Pain currently localizes to anterior shoulder and lateral shoulder.    Also complains of left shoulder pain today.  Pain anterior and lateral shoulder.  No new injury.  Feels like right shoulder but not as bad.      In therapy but ran out of sessions so on pause currently.     Outside reports reviewed: historical medical records and office notes.    Past Medical History:   Diagnosis Date    Fibroids        Patient Active Problem List   Diagnosis    S/P myomectomy    Screening for malignant neoplasm of colon    Benign neuroendocrine neoplasm of rectum    Chest pain of unknown etiology    History of COVID-19    Nonspecific abnormal electrocardiogram (ECG) (EKG)    Patellar tendon strain, right, initial encounter    Knee pain, right anterior    Tendinopathy of patella    Bilateral shoulder pain    Dysuria    Vaginal discharge       Past Surgical History:   Procedure Laterality Date    Abdominal myomectomy via mini-laparotomy (2 fibroids  02/20/2018    pelvic pain, fibroids    COLONOSCOPY N/A 10/25/2021    Procedure: COLONOSCOPY, with me;  Surgeon: Sarah Price MD;  Location: [facility] ([address] [place] [address]);  Service: Endoscopy;  Laterality: N/A;  pt completed COVID vaccine- see Immunization record in chart-rb  10/19 lvm to confirm appt-rb  10/20 arrival time confirmed with pt-rb    FLEXIBLE  SIGMOIDOSCOPY N/A 11/22/2021    Procedure: SIGMOIDOSCOPY, FLEXIBLE;  Surgeon: Sarah Price MD;  Location: [facility] OR [address] [place] [address];  Service: Colon and Rectal;  Laterality: N/A;    MYOMECTOMY      WISDOM TOOTH EXTRACTION          Current Outpatient Medications   Medication Instructions    acetaminophen (TYLENOL) 325 mg, Oral, Every 6 hours PRN    albuterol (PROVENTIL HFA) 90 mcg/actuation inhaler 2 puffs, Inhalation, Every 6 hours PRN, Rescue    cetirizine (ZYRTEC) 10 mg, Oral, Daily    diclofenac sodium (VOLTAREN) 2 g, Topical (Top), 3 times daily    fluticasone propionate (FLONASE) 50 mcg, Each Nostril, Daily    ipratropium (ATROVENT) 42 mcg (0.06 %) nasal spray 2 sprays, Nasal, 4 times daily    LATISSE 0.03 % ophthalmic solution Apply 1 drop via applicator once a day    omeprazole (PRILOSEC) 40 mg, Oral, Daily    promethazine-dextromethorphan (PROMETHAZINE-DM) 6.25-15 mg/5 mL Syrp 5 mLs, Oral, Nightly PRN    TAZORAC 0.1 % cream Topical (Top), As needed (PRN)        Review of patient's allergies indicates:  No Known Allergies    Social History     Socioeconomic History    Marital status: Single   Tobacco Use    Smoking status: Never    Smokeless tobacco: Never   Substance and Sexual Activity    Alcohol use: No    Drug use: No    Sexual activity: Not Currently       Family History   Problem Relation Age of Onset    Breast cancer Maternal Grandmother         dx age unknown     Colon cancer Maternal Grandmother     Cancer Maternal Grandmother     Stroke Father     Hypertension Father     Ovarian cancer Neg Hx          Review of Systems   Constitutional: Negative for chills and fever.   HENT:  Negative for hearing loss.    Eyes:  Negative for blurred vision.   Cardiovascular:  Negative for chest pain.   Respiratory:  Negative for shortness of breath.    Gastrointestinal:  Negative for abdominal pain.   Neurological:  Negative for light-headedness.        Objective:      General    Nursing note and vitals  reviewed.  Constitutional: She is oriented to person, place, and time. She appears well-developed and well-nourished.   HENT:   Head: Normocephalic and atraumatic.   Eyes: Pupils are equal, round, and reactive to light.   Cardiovascular:  Normal rate and regular rhythm.            Pulmonary/Chest: Effort normal.   Abdominal: Soft.   Neurological: She is alert and oriented to person, place, and time.   Psychiatric: She has a normal mood and affect. Her behavior is normal.         Left Hand/Wrist Exam     Tests   Phalens Sign: negative  Finkelstein's test: negative  Carpal Tunnel Compression Test: negative    Atrophy  Thenar:  Negative  Hypothenar:  negative    Other     Sensory Exam  Median Distribution: normal  Ulnar Distribution: normal  Radial Distribution: normal      Right Shoulder Exam     Inspection/Observation   Swelling: absent  Bruising: absent  Deformity: absent    Tenderness   The patient is tender to palpation of the acromioclavicular joint, biceps tendon and greater tuberosity.    Range of Motion   Active abduction:  160   Passive abduction:  170   Forward Flexion:  160   External Rotation 0 degrees:  40   Internal rotation 0 degrees:  T10     Tests & Signs   Drop arm: negative  Hogan test: positive  Impingement: positive  Rotator Cuff Painful Arc/Range: moderate  Speed's Test: positive    Other   Sensation: normal    Left Shoulder Exam     Inspection/Observation   Swelling: absent  Bruising: absent  Deformity: absent    Tenderness   The patient is tender to palpation of the acromioclavicular joint, biceps tendon and greater tuberosity.    Range of Motion   Active abduction:  170   Passive abduction:  170   Forward Flexion:  170   External Rotation 0 degrees:  50   Internal rotation 0 degrees:  T10     Tests & Signs   Drop arm: negative  Hogan test: positive  Impingement: positive  Rotator Cuff Painful Arc/Range: mild  Active Compression Test (Veneta's Sign): positive  Speed's Test:  positive    Other   Sensation: normal     Comments:  Negative spurlings       Muscle Strength   Right Upper Extremity   Shoulder Abduction: 5/5   Shoulder Internal Rotation: 5/5   Shoulder External Rotation: 5/5   Biceps: 5/5   Left Upper Extremity  Shoulder Abduction: 5/5   Shoulder Internal Rotation: 5/5   Shoulder External Rotation: 5/5   Biceps: 5/5     Vascular Exam     Right Pulses      Radial:                    2+      Left Pulses      Radial:                    2+      Capillary Refill  Left Hand: normal capillary refill          Imaging:  MRI of the right shoulder taken taken  10/4/2023  was personally reviewed from the [facility] EMR.  Multiple T1 and T2 sequences including axial, coronal, and sagittal views were reviewed.  No acute fractures or dislocations are noted in these images.  There is partial tearing of the supraspinatus tendon articular surface.  There is biceps tendonitis noted.  There is some degenerative change in the AC joint, glenohumeral cartilage, and labral tissue noted.  There is tendinosis in the infraspinatus and subscapularis tendons noted.         Procedures      Assessment:       Kathe Hudson is a 43 y.o. female seen in the office today. The primary encounter diagnosis was Rotator cuff impingement syndrome of right shoulder. Diagnoses of Tear of right supraspinatus tendon, Tendinosis of right rotator cuff, Biceps tendonitis, right, and Rotator cuff impingement syndrome of left shoulder were also pertinent to this visit.  Non-operative treatment is recommended at this time. Therapy at this time.  Discussed surgical options for right shoulder given intraarticular pathology; would proceed if pain worsened or patient unsatisfied with shoulder condition but not the case at this time.  Will also do PT for left shoulder; can further investigate with Mri if pain worsens.  The natural history and expected course discussed with patient. Various treatment options were discussed,  including their risks and benefits. All of the patient's questions were answered.     Plan:      Physical therapy and rehabilitation treatment.  Tylenol 650mg TID, PRN pain.  Follow up in 3 months or as  needed if symptoms worsen.         Bob Denton IV, MD   of Clinical Orthopedics  Department of Orthopedic Surgery  [facility]  Office: [phone]  Website: [email or web address]      Orders Placed This Encounter    Ambulatory referral/consult to Physical/Occupational Therapy

## 2023-12-28 ENCOUNTER — CLINICAL SUPPORT (OUTPATIENT)
Dept: REHABILITATION | Facility: HOSPITAL | Age: 43
End: 2023-12-28
Payer: COMMERCIAL

## 2023-12-28 DIAGNOSIS — M75.42 ROTATOR CUFF IMPINGEMENT SYNDROME OF LEFT SHOULDER: ICD-10-CM

## 2023-12-28 DIAGNOSIS — M75.41 ROTATOR CUFF IMPINGEMENT SYNDROME OF RIGHT SHOULDER: ICD-10-CM

## 2023-12-28 DIAGNOSIS — M62.81 MUSCLE WEAKNESS OF EXTREMITY: Primary | ICD-10-CM

## 2023-12-28 DIAGNOSIS — M75.101 TEAR OF RIGHT SUPRASPINATUS TENDON: ICD-10-CM

## 2023-12-28 PROBLEM — M25.561 KNEE PAIN, RIGHT ANTERIOR: Status: RESOLVED | Noted: 2022-10-04 | Resolved: 2023-12-28

## 2023-12-28 PROBLEM — M67.969 TENDINOPATHY OF PATELLA: Status: RESOLVED | Noted: 2022-10-04 | Resolved: 2023-12-28

## 2023-12-28 PROCEDURE — 97161 PT EVAL LOW COMPLEX 20 MIN: CPT | Mod: PO

## 2023-12-28 PROCEDURE — 97110 THERAPEUTIC EXERCISES: CPT | Mod: PO

## 2023-12-28 NOTE — PLAN OF CARE
[facility]   Physical Therapy Initial Evaluation      Name: Kathe Hudson  Clinic Number: [id number]    Therapy Diagnosis:   Encounter Diagnoses   Name Primary?    Rotator cuff impingement syndrome of right shoulder     Tear of right supraspinatus tendon     Rotator cuff impingement syndrome of left shoulder     Muscle weakness of extremity Yes        Physician: Bob Denton IV, MD    Physician Orders: PT Eval and Treat   Medical Diagnosis from Referral: Rotator cuff impingement syndrome of right shoulder [M75.41], Tear of right supraspinatus tendon [M75.101], Rotator cuff impingement syndrome of left shoulder [M75.42]   Evaluation Date: 12/28/2023  Authorization Period Expiration: 12/31/23  Plan of Care Expiration: 3/28/24  Progress Note Due: 1/28/24  Date of Surgery: N/A  Visit # / Visits authorized: 1/ 1   FOTO: 1/ 3    Precautions: Standard     Time In: 11:00  Time Out: 11:48  Total Billable Time: 48 minutes    Subjective     Date of onset: 1 year    History of current condition - Kathe reports: diagnosed with partial tear in rotator cuff as well as biceps tendonitis. She was doing therapy before doing the MRI which improved her mobility. She still is not able to hold herself in a push up with pain and difficulty. She is a full time dance teacher and works out regularly so she would like to improved her shoulder mobility and strength. She denies neurological symptoms    Falls: no    Imaging: MRI studies    Prior Therapy: yes  Social History: N/A  Occupation: full time dance teacher  Prior Level of Function: independent  Current Level of Function: pain with use of bilateral shoulder     Pain:  Current 2/10, worst 8/10, best 0/10   Location: bilateral shoulder (R>L)   Description: aching  Aggravating Factors: pushups, lifting the arms  Easing Factors: stop using the shoulder, pec minor/major and upper trap massage    Patients goals: return to normal activities in the gym, etc       Medical History:   Past Medical History:   Diagnosis Date    Fibroids        Surgical History:   Kathe Hudson  has a past surgical history that includes [place] tooth extraction; Abdominal myomectomy via mini-laparotomy (2 fibroids (02/20/2018); Myomectomy; Colonoscopy (N/A, 10/25/2021); and Flexible sigmoidoscopy (N/A, 11/22/2021).    Medications:   Kathe has a current medication list which includes the following prescription(s): acetaminophen, albuterol, cetirizine, diclofenac sodium, fluticasone propionate, ipratropium, latisse, omeprazole, promethazine-dextromethorphan, and tazorac.    Allergies:   Review of patient's allergies indicates:  No Known Allergies     Objective      Observation: forward head, rounded shoulders     Posture: see above     Active/Passive  Range of Motion:   Shoulder Right Left   Flexion 155/155 with pain  170   Abduction 85 WNL   ER at 0 33 with pain  39   ER at 70 ~10 degrees with pain 68   IR 50 50   Reach behind head yes yes   Reach behind back  yes yes     Strength:  Shoulder Right Left   Flexion 4/5 * 4/5   Abduction 4/5 4/5 *   ER 4-/5 * 4/5   IR 4-/5 * 4+/5       Joint Mobility: posterior and inferior glides are normal bilaterally     Palpation: tenderness in left bicep tendon, pec major/minor, anterior shoulder   Tenderness in right bicep tendon and pec major/minor, anterior shoulder    Sensation: NT    Flexibility:   Lat: R mod limitation ; L mod limitation   Pec Minor: R mod limitation ; L mod limitation    Intake Outcome Measure for FOTO shoulder Survey    Therapist reviewed FOTO scores for Kathe Hudson on 12/28/2023.   FOTO report - see Media section or FOTO account episode details.    Intake Score: 52%         Treatment     Total Treatment time (time-based codes) separate from Evaluation: 15 minutes     Kathe received the treatments listed below:      therapeutic exercises to develop strength, endurance, ROM, flexibility, posture, and core  "stabilization for 15 minutes including:  Supine shoulder flexion AAROM with dowel x20  Seated external rotation PROM with dowel x30  Seated scap squeezes 30x3"  Supine serratus punch dowel 3x10    manual therapy techniques: Joint mobilizations, Manual traction, and Soft tissue Mobilization were applied for 0 minutes, including:      neuromuscular re-education activities to improve: Coordination, Kinesthetic, Sense, Proprioception, and Posture for 0 minutes. The following activities were included:      therapeutic activities to improve functional performance for 0  minutes, including:          Patient Education and Home Exercises     Education provided:   - education on condition  -home exercise program    Written Home Exercises Provided: yes. Exercises were reviewed and Kathe was able to demonstrate them prior to the end of the session.  Kathe demonstrated good  understanding of the education provided. See EMR under Patient Instructions for exercises provided during therapy sessions.    Assessment     Kathe is a 43 y.o. female referred to outpatient Physical Therapy with a medical diagnosis of Rotator cuff impingement syndrome of right shoulder [M75.41], Tear of right supraspinatus tendon [M75.101], Rotator cuff impingement syndrome of left shoulder [M75.42] . Patient presents with chronic history of bilateral shoulder pain with MRI confirmed rotator cuff tear. Subjective findings include painful and limited manual muscle testing, limitations in upper extremity range of motion, and muscle flexibility. She will benefit from skilled PT to improve her function for full return to regular exercises and dance class.     Patient prognosis is Good.   Patient will benefit from skilled outpatient Physical Therapy to address the deficits stated above and in the chart below, provide patient /family education, and to maximize patientt's level of independence.     Plan of care discussed with patient: Yes  Patient's spiritual, " cultural and educational needs considered and patient is agreeable to the plan of care and goals as stated below:     Anticipated Barriers for therapy: none    Medical Necessity is demonstrated by the following  History  Co-morbidities and personal factors that may impact the plan of care [x] LOW: no personal factors / co-morbidities  [] MODERATE: 1-2 personal factors / co-morbidities  [] HIGH: 3+ personal factors / co-morbidities    Moderate / High Support Documentation:   Co-morbidities affecting plan of care:     Personal Factors:   no deficits     Examination  Body Structures and Functions, activity limitations and participation restrictions that may impact the plan of care [x] LOW: addressing 1-2 elements  [] MODERATE: 3+ elements  [] HIGH: 4+ elements (please support below)    Moderate / High Support Documentation:      Clinical Presentation [x] LOW: stable  [] MODERATE: Evolving  [] HIGH: Unstable     Decision Making/ Complexity Score: low       GOALS: (not met, progressing unless otherwise specified)  Short Term Goals:  4 weeks  1.Report decreased shoulder pain < / =  4/10  to increase tolerance for ADLs  2. Increase PROM by 15 degrees of external rotation    3. Increased strength by 1/3 MMT grade in bilateral upper extremity to increase tolerance for ADL and work activities.  4. Pt to tolerate HEP to improve ROM and independence with ADL's    Long Term Goals: 8 weeks  1.Report decreased bilateral shoulder pain  < / =  2 /10  to increase tolerance for daily exercise   2.Increase AROM to equal to left  3.Increase strength to >/= 4/5 in bilateral upper extremity to increase tolerance for ADL and work activities.  4. Pt goal: PT to perform body weight pushup without increase in shoulder pain  5. Pt will have improved gcode of CJ (20-40% limited) on FOTO shoulder in order to demonstrate true functional improvement.     Plan     Plan of care Certification: 12/28/2023 to 3/28/24.    Outpatient Physical Therapy 2  times weekly for 8 weeks to include the following interventions: Aquatic Therapy, Cervical/Lumbar Traction, Electrical Stimulation  , Gait Training, Manual Therapy, Moist Heat/ Ice, Neuromuscular Re-ed, Patient Education, Therapeutic Activities, Therapeutic Exercise, and Ultrasound.     Stephany Ayers, NEFTALI        Physician's Signature: _________________________________________ Date: ________________

## 2024-06-27 ENCOUNTER — TELEPHONE (OUTPATIENT)
Dept: OBSTETRICS AND GYNECOLOGY | Facility: CLINIC | Age: 44
End: 2024-06-27
Payer: COMMERCIAL

## 2024-06-27 NOTE — TELEPHONE ENCOUNTER
Spoke to pt. She wanted an appt for an WWE and check for gema-menopause. Told pt that Dr. Perry is not taking any new patients, or performing any well woman exams at this time. All of her patients that need to be seen for well woman exams, or other gynecological concerns, are referred to her partners. She asked when the next availability would be with a female provider, I checked the schedule and told her 8/29. Pt said she wanted something sooner so she will schedule somewhere else. No further questions.

## 2024-06-27 NOTE — TELEPHONE ENCOUNTER
----- Message from Inez Vela sent at 6/27/2024 12:09 PM CDT -----  Type:  Pt Advice    Who Called:Pt   Does the patient know what this is regarding?: Pt has apt tomorrow, menstrual cycle started  would like to know if its still okay to come   Would the patient rather a call back or a response via [facility]? Call   Best Call Back Number: [phone]  Additional Information:

## 2024-06-27 NOTE — TELEPHONE ENCOUNTER
Patient started menstrual cycle and request rescheduling. Scheduled 08/07/2024 ( placed on cancellation list)

## 2024-06-27 NOTE — TELEPHONE ENCOUNTER
----- Message from Celia Aponte sent at 6/27/2024  3:53 PM CDT -----  Patient Returning a call    Who is calling?  Patient  Who called patient?  Office  Call back or [facility] message?  Call back  Call back number: [phone]

## 2024-06-27 NOTE — TELEPHONE ENCOUNTER
----- Message from Marianna Gary sent at 6/27/2024  2:51 PM CDT -----  Regarding: Sooner Appt  Contact: Patient  Type:  Sooner Apoointment Request    Caller is requesting a sooner appointment.  Caller declined first available appointment listed below.  Caller will not accept being placed on the waitlist and is requesting a message be sent to doctor.  Name of Caller:Patient   When is the first available appointment? No appts generated   Symptoms: WWE discuss menopause   Would the patient rather a call back or a response via [facility]? Call back   Best Call Back Number: [phone]  Additional Information: Patient is a former patient of physician and would like to be seen for a wwe and discuss menopause Patient stated if physician is unable to see she would like a suggestion on who she should see for further assistance please assist

## 2024-07-02 ENCOUNTER — LAB VISIT (OUTPATIENT)
Dept: LAB | Facility: OTHER | Age: 44
End: 2024-07-02
Payer: COMMERCIAL

## 2024-07-02 ENCOUNTER — OFFICE VISIT (OUTPATIENT)
Dept: OBSTETRICS AND GYNECOLOGY | Facility: CLINIC | Age: 44
End: 2024-07-02
Payer: COMMERCIAL

## 2024-07-02 VITALS
BODY MASS INDEX: 23.07 KG/M2 | DIASTOLIC BLOOD PRESSURE: 81 MMHG | SYSTOLIC BLOOD PRESSURE: 134 MMHG | WEIGHT: 147.25 LBS

## 2024-07-02 DIAGNOSIS — Z98.890 S/P MYOMECTOMY: Primary | ICD-10-CM

## 2024-07-02 DIAGNOSIS — Z01.419 WELL WOMAN EXAM WITH ROUTINE GYNECOLOGICAL EXAM: ICD-10-CM

## 2024-07-02 DIAGNOSIS — Z01.419 WELL WOMAN EXAM WITH ROUTINE GYNECOLOGICAL EXAM: Primary | ICD-10-CM

## 2024-07-02 DIAGNOSIS — Z12.4 CERVICAL CANCER SCREENING: ICD-10-CM

## 2024-07-02 DIAGNOSIS — Z12.39 ENCOUNTER FOR SCREENING FOR MALIGNANT NEOPLASM OF BREAST, UNSPECIFIED SCREENING MODALITY: ICD-10-CM

## 2024-07-02 DIAGNOSIS — Z98.890 S/P MYOMECTOMY: ICD-10-CM

## 2024-07-02 DIAGNOSIS — Z11.51 ENCOUNTER FOR SCREENING FOR HUMAN PAPILLOMAVIRUS (HPV): ICD-10-CM

## 2024-07-02 LAB
IRON SERPL-MCNC: 90 UG/DL (ref 30–160)
SATURATED IRON: 18 % (ref 20–50)
TOTAL IRON BINDING CAPACITY: 487 UG/DL (ref 250–450)
TRANSFERRIN SERPL-MCNC: 329 MG/DL (ref 200–375)

## 2024-07-02 PROCEDURE — 3075F SYST BP GE 130 - 139MM HG: CPT | Mod: CPTII,S$GLB,, | Performed by: OBSTETRICS & GYNECOLOGY

## 2024-07-02 PROCEDURE — 99999 PR PBB SHADOW E&M-EST. PATIENT-LVL III: CPT | Mod: PBBFAC,,, | Performed by: OBSTETRICS & GYNECOLOGY

## 2024-07-02 PROCEDURE — 87624 HPV HI-RISK TYP POOLED RSLT: CPT

## 2024-07-02 PROCEDURE — 3008F BODY MASS INDEX DOCD: CPT | Mod: CPTII,S$GLB,, | Performed by: OBSTETRICS & GYNECOLOGY

## 2024-07-02 PROCEDURE — 83540 ASSAY OF IRON: CPT

## 2024-07-02 PROCEDURE — 3079F DIAST BP 80-89 MM HG: CPT | Mod: CPTII,S$GLB,, | Performed by: OBSTETRICS & GYNECOLOGY

## 2024-07-02 PROCEDURE — 82652 VIT D 1 25-DIHYDROXY: CPT

## 2024-07-02 PROCEDURE — 88175 CYTOPATH C/V AUTO FLUID REDO: CPT

## 2024-07-02 PROCEDURE — 99386 PREV VISIT NEW AGE 40-64: CPT | Mod: S$GLB,,, | Performed by: OBSTETRICS & GYNECOLOGY

## 2024-07-02 PROCEDURE — 1159F MED LIST DOCD IN RCRD: CPT | Mod: CPTII,S$GLB,, | Performed by: OBSTETRICS & GYNECOLOGY

## 2024-07-02 PROCEDURE — 36415 COLL VENOUS BLD VENIPUNCTURE: CPT

## 2024-07-02 NOTE — PROGRESS NOTES
History & Physical  Gynecology      SUBJECTIVE:     Chief Complaint: Well Woman       History of Present Illness:  SUBJECTIVE:   44 y.o. female  here for annual.     Periods:  She describes her periods as regular, lasting 7 days. normal flow.  denies break through bleeding.     Contraception:  She uses no method for contraception.    Sexually Active:  She is not sexually active.   denies vaginal itching or irritation.  denies vaginal discharge.    STIs:  No hx    Incontinence:   Denies    Menopausal Symptoms:  Yes hot flashes, around menstruation. Lasting for a couple hours.   No vaginal dryness     Breast Symptoms:   No masses, pain, skin changes, or nipple discharge    Social:  Smoke: No  Drink: No  Illicit Drug: No  Vitamins: Occasional multivitamins  Diet: Eats primarily home cooked meals well balanced with fruits and vegetables   Exercise: Yes, gym 4 times a week, pilates 2X week  Safety: Yes, lives with her father  Relationship: No    Depression: No  Anxiety: No    History of abnormal pap: No  Last Pap:   Last MMG: None documented  Colonoscopy:   Hx benign polyp with bleeding and constipation    Family history: negative for ovarian, uterine and colon cancer  Maternal grandmother with breast cancer    Review of patient's allergies indicates:  No Known Allergies    Past Medical History:   Diagnosis Date    Fibroids      Past Surgical History:   Procedure Laterality Date    Abdominal myomectomy via mini-laparotomy (2 fibroids  2018    pelvic pain, fibroids    COLONOSCOPY N/A 10/25/2021    Procedure: COLONOSCOPY, with me;  Surgeon: Sarah Price MD;  Location: [address]);  Service: Endoscopy;  Laterality: N/A;  pt completed COVID vaccine- see Immunization record in chart-rb  10/19 lvm to confirm appt-rb  10/20 arrival time confirmed with pt-rb    FLEXIBLE SIGMOIDOSCOPY N/A 2021    Procedure: SIGMOIDOSCOPY, FLEXIBLE;  Surgeon: Sarah Price MD;  Location: [address]  [facility];  Service: Colon and Rectal;  Laterality: N/A;    MYOMECTOMY      WISDOM TOOTH EXTRACTION       OB History          0    Para   0    Term   0       0    AB   0    Living   0         SAB   0    IAB   0    Ectopic   0    Multiple   0    Live Births   0               Family History   Problem Relation Name Age of Onset    Breast cancer Maternal Grandmother Diane         dx age unknown     Colon cancer Maternal Grandmother Diane     Cancer Maternal Grandmother Diane     Stroke Father      Hypertension Father      Ovarian cancer Neg Hx       Social History     Tobacco Use    Smoking status: Never    Smokeless tobacco: Never   Substance Use Topics    Alcohol use: No    Drug use: No       Current Outpatient Medications   Medication Sig    acetaminophen (TYLENOL) 325 MG tablet Take 325 mg by mouth every 6 (six) hours as needed for Pain. (Patient not taking: Reported on 2024)    albuterol (PROVENTIL HFA) 90 mcg/actuation inhaler Inhale 2 puffs into the lungs every 6 (six) hours as needed for Wheezing. Rescue    cetirizine (ZYRTEC) 10 MG tablet Take 1 tablet (10 mg total) by mouth once daily.    diclofenac sodium (VOLTAREN) 1 % Gel Apply 2 g topically 3 (three) times daily. (Patient not taking: Reported on 2024)    fluticasone propionate (FLONASE) 50 mcg/actuation nasal spray 1 spray (50 mcg total) by Each Nostril route once daily. (Patient not taking: Reported on 2024)    ipratropium (ATROVENT) 42 mcg (0.06 %) nasal spray 2 sprays by Nasal route 4 (four) times daily.    LATISSE 0.03 % ophthalmic solution Apply 1 drop via applicator once a day    omeprazole (PRILOSEC) 40 MG capsule Take 1 capsule (40 mg total) by mouth once daily. (Patient not taking: Reported on 2022)    promethazine-dextromethorphan (PROMETHAZINE-DM) 6.25-15 mg/5 mL Syrp Take 5 mLs by mouth nightly as needed (for cough. Do not take with any other cough syrups. Can make you drowsy.). (Patient not taking: Reported on  7/2/2024)    TAZORAC 0.1 % cream Apply topically as needed. (Patient not taking: Reported on 7/2/2024)     No current facility-administered medications for this visit.     Review of Systems   Constitutional:  Negative for chills, fatigue and fever.   Respiratory:  Negative for shortness of breath.    Cardiovascular:  Negative for chest pain.   Gastrointestinal:  Negative for abdominal pain, nausea and vomiting.   Endocrine: Negative for hot flashes.   Genitourinary:  Negative for menstrual problem and pelvic pain.   Musculoskeletal:  Negative for back pain.   Integumentary:  Negative for breast mass, nipple discharge and breast skin changes.   Neurological:  Negative for headaches.   Hematological:  Does not bruise/bleed easily.   Psychiatric/Behavioral:  Negative for depression.    Breast: Negative for mass, mastodynia, nipple discharge and skin changes       OBJECTIVE:        Physical Exam  Constitutional:       Appearance: Normal appearance.   HENT:      Head: Normocephalic and atraumatic.   Eyes:      Extraocular Movements: Extraocular movements intact.      Pupils: Pupils are equal, round, and reactive to light.   Cardiovascular:      Rate and Rhythm: Normal rate.   Pulmonary:      Effort: Pulmonary effort is normal. No respiratory distress.   Abdominal:      General: Abdomen is flat. There is no distension.      Palpations: Abdomen is soft. There is no mass.      Tenderness: There is no abdominal tenderness. There is no guarding.   Genitourinary:     General: Normal vulva.      Vagina: No signs of injury and foreign body. No vaginal discharge, erythema, tenderness or bleeding.      Cervix: No cervical motion tenderness, discharge, friability, erythema or cervical bleeding.      Uterus: Normal. Not deviated, not fixed and not tender.       Adnexa: Right adnexa normal and left adnexa normal.        Right: No mass, tenderness or fullness.          Left: No mass, tenderness or fullness.     Musculoskeletal:          General: Normal range of motion.      Cervical back: Normal range of motion and neck supple.   Skin:     General: Skin is warm and dry.   Neurological:      Mental Status: She is alert and oriented to person, place, and time.   Psychiatric:         Mood and Affect: Mood normal.           ASSESSMENT:       ICD-10-CM ICD-9-CM    1. S/P myomectomy  Z98.890 V45.89 US Transvaginal Non OB      2. Well woman exam with routine gynecological exam  Z01.419 V72.31 Calcitriol      IRON AND TIBC      3. Cervical cancer screening  Z12.4 V76.2 CANCELED: Liquid-Based Pap Smear, Screening      CANCELED: HPV High Risk Genotypes, PCR      4. Encounter for screening for malignant neoplasm of breast, unspecified screening modality  Z12.39 V76.10 Mammo Digital Diagnostic Bilat with Alon             Plan:   Well Women's Exam   - Pap and HPV collected today  - Patient was counseled on ASCCP guidelines for cervical cytology screening.   - STI Screening: Declined  - Mammogram: Ordered   - Patient was counseled on current recommendations for breast cancer screening.   - Colonoscopy: UTD, f/u with Gen Surg   - Anxiety screening: Negative  - Contraception: None  - Diet/Exercise: Continue healthy eating habits and exercise  - Menopausal Symptoms: No   - Patient counseled to follow-up with her PCP for other routine health maintenance.   - Discussed perimenopause and checking hormonal labs. Explained to patient that assessing hormones are not necessary when diagnosing perimenopause or menopause. Informed the patient that since she is still having regular periods, menopause is unlikely. Perimenopause is possible in her case but we can treat symptoms, do not need levels to diagnose it, and overall it will not change our management.    Hx Fibroids  - S/p myomectomy  - TSH normal, HbA1c normal  - Increased bloating recently and weight gain  - TVUS ordered to assess for fibroids    Counseling time: 30 minutes     Pasha Irizarry MD  PGY-3  Obstetrics and Gynecology

## 2024-07-03 LAB
CLINICAL INFO: NORMAL
DATE OF PREVIOUS PAP: NORMAL
DATE PREVIOUS BX: NORMAL
LMP START DATE: NORMAL
SPECIMEN SOURCE CVX/VAG CYTO: NORMAL

## 2024-07-05 ENCOUNTER — PATIENT MESSAGE (OUTPATIENT)
Dept: OBSTETRICS AND GYNECOLOGY | Facility: CLINIC | Age: 44
End: 2024-07-05
Payer: COMMERCIAL

## 2024-07-08 LAB — 1,25(OH)2D3 SERPL-MCNC: 56 PG/ML (ref 20–79)

## 2024-07-10 LAB
CLINICAL INFO: NORMAL
CYTO CVX: NORMAL
CYTOLOGIST CVX/VAG CYTO: NORMAL
CYTOLOGIST CVX/VAG CYTO: NORMAL
CYTOLOGY CMNT CVX/VAG CYTO-IMP: NORMAL
CYTOLOGY PAP THIN PREP EXPLANATION: NORMAL
DATE OF PREVIOUS PAP: NORMAL
DATE PREVIOUS BX: NORMAL
GEN CATEG CVX/VAG CYTO-IMP: NORMAL
HPV I/H RISK 4 DNA CVX QL NAA+PROBE: NOT DETECTED
LMP START DATE: NORMAL
MICROORGANISM CVX/VAG CYTO: NORMAL
PATHOLOGIST CVX/VAG CYTO: NORMAL
SERVICE CMNT-IMP: NORMAL
SPECIMEN SOURCE CVX/VAG CYTO: NORMAL
STAT OF ADQ CVX/VAG CYTO-IMP: NORMAL

## 2024-08-07 ENCOUNTER — PATIENT MESSAGE (OUTPATIENT)
Dept: OBSTETRICS AND GYNECOLOGY | Facility: CLINIC | Age: 44
End: 2024-08-07
Payer: COMMERCIAL

## 2024-08-07 PROBLEM — Z12.11 SCREENING FOR MALIGNANT NEOPLASM OF COLON: Status: RESOLVED | Noted: 2021-10-25 | Resolved: 2024-08-07

## 2024-08-07 PROBLEM — Z98.890 S/P MYOMECTOMY: Status: RESOLVED | Noted: 2018-02-19 | Resolved: 2024-08-07

## 2024-08-07 PROBLEM — R07.9 CHEST PAIN OF UNKNOWN ETIOLOGY: Status: RESOLVED | Noted: 2022-06-06 | Resolved: 2024-08-07

## 2024-08-07 PROBLEM — Z86.16 HISTORY OF COVID-19: Status: RESOLVED | Noted: 2022-06-06 | Resolved: 2024-08-07

## 2024-08-07 PROBLEM — N89.8 VAGINAL DISCHARGE: Status: RESOLVED | Noted: 2023-08-30 | Resolved: 2024-08-07

## 2024-08-07 PROBLEM — R30.0 DYSURIA: Status: RESOLVED | Noted: 2023-08-30 | Resolved: 2024-08-07

## 2025-03-07 ENCOUNTER — OFFICE VISIT (OUTPATIENT)
Dept: OBSTETRICS AND GYNECOLOGY | Facility: CLINIC | Age: 45
End: 2025-03-07
Payer: COMMERCIAL

## 2025-03-07 VITALS
SYSTOLIC BLOOD PRESSURE: 123 MMHG | HEART RATE: 67 BPM | BODY MASS INDEX: 22.99 KG/M2 | DIASTOLIC BLOOD PRESSURE: 84 MMHG | WEIGHT: 146.81 LBS

## 2025-03-07 DIAGNOSIS — N89.8 VAGINAL ODOR: Primary | ICD-10-CM

## 2025-03-07 PROCEDURE — 81515 NFCT DS BV&VAGINITIS DNA ALG: CPT | Performed by: OBSTETRICS & GYNECOLOGY

## 2025-03-07 PROCEDURE — 99999 PR PBB SHADOW E&M-EST. PATIENT-LVL III: CPT | Mod: PBBFAC,,, | Performed by: OBSTETRICS & GYNECOLOGY

## 2025-03-07 NOTE — PROGRESS NOTES
CC: Problem Visit    Kathe Hudson is a 45 y.o. female  presents with vaginal odor x 3 months.  She states that it is musty and worse by the end of the day.  She denies vaginal discharge or itching.  She uses [employer] products.  Denies chronic use of panty liners or pads and wears cotton underwear.    OB History          0    Para   0    Term   0       0    AB   0    Living   0         SAB   0    IAB   0    Ectopic   0    Multiple   0    Live Births   0               Gynecology   Past Medical History:   Diagnosis Date    Fibroids      Past Surgical History:   Procedure Laterality Date    Abdominal myomectomy via mini-laparotomy (2 fibroids  2018    pelvic pain, fibroids    COLONOSCOPY N/A 10/25/2021    Procedure: COLONOSCOPY, with me;  Surgeon: Sarah Price MD;  Location: [facility] (4TH FLR);  Service: Endoscopy;  Laterality: N/A;  pt completed COVID vaccine- see Immunization record in chart-rb  10/19 lvm to confirm appt-rb  10/20 arrival time confirmed with pt-rb    FLEXIBLE SIGMOIDOSCOPY N/A 2021    Procedure: SIGMOIDOSCOPY, FLEXIBLE;  Surgeon: Sarah Price MD;  Location: [facility] OR 2ND FLR;  Service: Colon and Rectal;  Laterality: N/A;    MYOMECTOMY      WISDOM TOOTH EXTRACTION       Social History[1]  Family History   Problem Relation Name Age of Onset    Breast cancer Maternal Grandmother Diane         dx age unknown     Colon cancer Maternal Grandmother Diane     Cancer Maternal Grandmother Diane     Stroke Father      Hypertension Father      Ovarian cancer Neg Hx      Uterine cancer Neg Hx      Cervical cancer Neg Hx           /84 (Patient Position: Sitting)   Pulse 67   Wt 66.6 kg (146 lb 12.8 oz)   LMP 2025 (Exact Date)   BMI 22.99 kg/m²       ROS  Review of Systems   Constitutional: Negative.    Gastrointestinal: Negative.    Genitourinary:         Vaginal odor  Denies vaginal discharge or itching   Psychiatric/Behavioral:  Negative.            PHYSICAL EXAM:  Physical Exam  Vitals reviewed.   Constitutional:       Appearance: Normal appearance. She is well-developed and normal weight.   Genitourinary:     General: Normal vulva.      Exam position: Lithotomy position.      Labia:         Right: No rash, tenderness, lesion or injury.         Left: No rash, tenderness, lesion or injury.       Urethra: No prolapse, urethral pain, urethral swelling or urethral lesion.      Vagina: Normal.      Cervix: Discharge present. No cervical motion tenderness, lesion or cervical bleeding.      Uterus: Normal. Not enlarged and not tender.       Adnexa: Right adnexa normal and left adnexa normal.        Right: No mass, tenderness or fullness.          Left: No mass, tenderness or fullness.     Neurological:      Mental Status: She is alert.   Psychiatric:         Behavior: Behavior is cooperative.            Vaginal odor      Reviewed simple feminine hygiene  Vaginosis swab collected  Pelvic exam WNL  Will follow up with results         [1]   Social History  Socioeconomic History    Marital status: Single   Tobacco Use    Smoking status: Never    Smokeless tobacco: Never   Substance and Sexual Activity    Alcohol use: No    Drug use: No    Sexual activity: Not Currently

## 2025-03-08 LAB
BACTERIAL VAGINOSIS DNA: NOT DETECTED
CANDIDA GLABRATA/KRUSEI: NOT DETECTED
CANDIDA RRNA VAG QL PROBE: NOT DETECTED
TRICHOMONAS VAGINALIS: NOT DETECTED

## 2025-03-10 ENCOUNTER — RESULTS FOLLOW-UP (OUTPATIENT)
Dept: OBSTETRICS AND GYNECOLOGY | Facility: CLINIC | Age: 45
End: 2025-03-10

## 2025-03-17 ENCOUNTER — CLINICAL SUPPORT (OUTPATIENT)
Dept: OBSTETRICS AND GYNECOLOGY | Facility: CLINIC | Age: 45
End: 2025-03-17
Payer: COMMERCIAL

## 2025-03-17 DIAGNOSIS — N95.1 MENOPAUSAL SYMPTOMS: Primary | ICD-10-CM

## 2025-03-18 ENCOUNTER — TELEPHONE (OUTPATIENT)
Dept: OBSTETRICS AND GYNECOLOGY | Facility: CLINIC | Age: 45
End: 2025-03-18
Payer: COMMERCIAL

## 2025-03-18 NOTE — PROGRESS NOTES
Personal Information    Full Name: Kathe Hudson 1980    Appt schedule  w/ Dr. Bermudez 5/2/2025    Medical History    Do you have a chronic medical condition? (e.g., diabetes, hypertension, thyroid issues)   If yes, please specify:   No    2.   Do you have a history of hormone- related conditions? (e.g., endometriosis, breast cancer)   If yes, please explain:   No    3.   Have you previously or are you currently taking hormone replacement therapy?   If yes, please list:   No    Menstrual History    At what age did you begin menstruating?   11    2.   Have you experienced any changes in your menstrual cycle in the last year?   If yes, please describe:   No    3.   When was your last menstrual period or age of last period?   3/14/2025    4.   Have you had a hysterectomy?   If yes, at what age?  No    Symptoms Assesments      Are you experiencing any of the following symptoms:  Hot Flashes: Yes   Night Sweats: Yes   Vaginal Dryness or Pain with [place]: Yes   Mood Swings: Yes   Anxiety or Depression: Yes   Sleep Disturbances: Yes   Fatigue: Yes   Weight Gain: Yes   Joint or Muscle Pain: Yes   Memory Issues or Brain Fog: Yes   Decreased Interest in Sex (Low Libido): Yes  Colonoscopy Yes-10/2021     Lifestyle Factors    How would you describe your diet?  Balanced    2.   How often do you exercise?   Regularly    3.   Do you smoke or consume alcohol?   If yes, please specify frequency:   No    4.   How would you rate your stress levels?   Moderate    Family History    Is there a family history of menopause-related conditions? (e.g., osteoporosis, breast cancer)  If yes, please specify  Yes - Maternal GM Breast cancer     2.   Is there a family history of heart disease?   If yes, please specify   No    Spoke with patient for a total of 30 minutes during virtual visit.  Patient was guided through expectations and treatment options.     Questions answered. Encouraged to send message or call office with any  questions/concerns. Verbalized understanding.       Jeri

## 2025-04-14 ENCOUNTER — OFFICE VISIT (OUTPATIENT)
Dept: INTERNAL MEDICINE | Facility: CLINIC | Age: 45
End: 2025-04-14
Payer: COMMERCIAL

## 2025-04-14 VITALS
HEIGHT: 66 IN | SYSTOLIC BLOOD PRESSURE: 110 MMHG | DIASTOLIC BLOOD PRESSURE: 70 MMHG | HEART RATE: 67 BPM | WEIGHT: 144.38 LBS | OXYGEN SATURATION: 98 % | BODY MASS INDEX: 23.2 KG/M2

## 2025-04-14 DIAGNOSIS — Z00.00 ANNUAL PHYSICAL EXAM: ICD-10-CM

## 2025-04-14 DIAGNOSIS — Z12.31 ENCOUNTER FOR SCREENING MAMMOGRAM FOR MALIGNANT NEOPLASM OF BREAST: ICD-10-CM

## 2025-04-14 DIAGNOSIS — F32.0 CURRENT MILD EPISODE OF MAJOR DEPRESSIVE DISORDER, UNSPECIFIED WHETHER RECURRENT: ICD-10-CM

## 2025-04-14 DIAGNOSIS — D3A.8 BENIGN NEUROENDOCRINE NEOPLASM OF RECTUM: ICD-10-CM

## 2025-04-14 DIAGNOSIS — Z13.39 ADHD (ATTENTION DEFICIT HYPERACTIVITY DISORDER) EVALUATION: Primary | ICD-10-CM

## 2025-04-14 PROCEDURE — 1160F RVW MEDS BY RX/DR IN RCRD: CPT | Mod: CPTII,S$GLB,,

## 2025-04-14 PROCEDURE — 99204 OFFICE O/P NEW MOD 45 MIN: CPT | Mod: S$GLB,,,

## 2025-04-14 PROCEDURE — 3074F SYST BP LT 130 MM HG: CPT | Mod: CPTII,S$GLB,,

## 2025-04-14 PROCEDURE — 1159F MED LIST DOCD IN RCRD: CPT | Mod: CPTII,S$GLB,,

## 2025-04-14 PROCEDURE — 99999 PR PBB SHADOW E&M-EST. PATIENT-LVL IV: CPT | Mod: PBBFAC,,,

## 2025-04-14 PROCEDURE — 3008F BODY MASS INDEX DOCD: CPT | Mod: CPTII,S$GLB,,

## 2025-04-14 PROCEDURE — 3078F DIAST BP <80 MM HG: CPT | Mod: CPTII,S$GLB,,

## 2025-04-14 NOTE — LETTER
April 14, 2025      [facility]  [address]  [facility] [address]  Phone: [phone]  Fax: [phone]       Patient: Kathe Hudson   YOB: 1980  Date of Visit: 04/14/2025    To Whom It May Concern:    Des Hudson  was at [facility] on 04/14/2025. The patient may return to work/school on 04/15/2025 with no restrictions. If you have any questions or concerns, or if I can be of further assistance, please do not hesitate to contact me.    Sincerely,    Francisco eBrmudez MD.

## 2025-04-14 NOTE — PROGRESS NOTES
History & Physical  [facility]      SUBJECTIVE:     History of Present Illness:  Patient is a 45 y.o. female presents to clinic to establish care. Current diagnoses include hx of benign neuroendocrine neoplasm of rectum, chronic shoulder pain    She reports pin-like abdominal pain that improves following bowel movements. She maintains regular bowel movements. Her diet consists of whole foods with limited caffeine intake, occasionally consuming matcha lattes and avoiding sodas.    She requests ADHD testing, noting family history of ADHD. Her sister disclosed that their mother had identified ADHD symptoms during her childhood but did not pursue formal diagnosis or treatment. She describes difficulty completing projects despite having creative ideas, persistent self-blame, significant exhaustion, and problems with focus. She reports persistent sadness, social withdrawal including weekends, and lack of social support. She endorses difficulty getting out of bed, specifically noting this occurred on Friday and today. Her appetite has decreased over the past week, picking at food rather than eating regular meals. 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 She also assists with her father's care, which requires significant effort to maintain focus. She denies SI/HI    Last pap smear- due 7/2/2029   Last mammogram- never completed, due    Immunizations UTD  Last colonoscopy-  Colonoscopy in 2021 revealed an adenoma. Due for follow up with GI  Last HgbA1C- due  Last lipid panel- due    Review of patient's allergies indicates:  No Known Allergies    Past Medical History:   Diagnosis Date    Fibroids      Past Surgical History:   Procedure Laterality Date    Abdominal myomectomy via mini-laparotomy (2 fibroids  02/20/2018    pelvic pain,  "fibroids    COLONOSCOPY N/A 10/25/2021    Procedure: COLONOSCOPY, with me;  Surgeon: Sarah Price MD;  Location: [facility] (4TH FLR);  Service: Endoscopy;  Laterality: N/A;  pt completed COVID vaccine- see Immunization record in chart-rb  10/19 lvm to confirm appt-rb  10/20 arrival time confirmed with pt-rb    FLEXIBLE SIGMOIDOSCOPY N/A 11/22/2021    Procedure: SIGMOIDOSCOPY, FLEXIBLE;  Surgeon: Sarah Price MD;  Location: [facility] OR 2ND FLR;  Service: Colon and Rectal;  Laterality: N/A;    MYOMECTOMY      WISDOM TOOTH EXTRACTION       Family History   Problem Relation Name Age of Onset    Breast cancer Maternal Grandmother Diane         dx age unknown     Colon cancer Maternal Grandmother Diane     Cancer Maternal Grandmother Diane     Stroke Father      Hypertension Father      Ovarian cancer Neg Hx      Uterine cancer Neg Hx      Cervical cancer Neg Hx       Social History[1]     OBJECTIVE:     Vital Signs (Most Recent)  Vitals:    04/14/25 1405   BP: 110/70   BP Location: Left arm   Patient Position: Sitting   Pulse: 67   SpO2: 98%   Weight: 65.5 kg (144 lb 6.4 oz)   Height: 5' 6" (1.676 m)         Physical Exam  Constitutional:       General: She is not in acute distress.     Appearance: Normal appearance. She is not toxic-appearing.   HENT:      Head: Normocephalic and atraumatic.      Right Ear: External ear normal.      Left Ear: External ear normal.      Nose: Nose normal.      Mouth/Throat:      Mouth: Mucous membranes are moist.   Eyes:      Extraocular Movements: Extraocular movements intact.   Cardiovascular:      Rate and Rhythm: Normal rate and regular rhythm.      Pulses: Normal pulses.      Heart sounds: Normal heart sounds.   Pulmonary:      Effort: Pulmonary effort is normal. No respiratory distress.   Abdominal:      General: Abdomen is flat.      Palpations: Abdomen is soft.      Tenderness: There is no abdominal tenderness.   Musculoskeletal:      Cervical back: Normal range of " motion and neck supple.   Skin:     General: Skin is warm.      Findings: No bruising or erythema.   Neurological:      General: No focal deficit present.      Mental Status: She is alert.   Psychiatric:         Mood and Affect: Mood normal.           ASSESSMENT/PLAN:   45 y.o.female presents to clinic to establish care.     Symptoms suggestive of depression rather than ADHD, including guilt, loss of interest, and changes in appetite, and low mood  Abdominal pain likely related to gas or dietary factors, as bowel movement occurred within 24 hours.  Reviewed colonoscopy history from 2021, noting adenoma found requiring follow-up.    Kathe was seen today for constipation, trouble focusing, abdominal pain and eye twitching.    Diagnoses and all orders for this visit:    ADHD (attention deficit hyperactivity disorder) evaluation    Annual physical exam  -     Hemoglobin A1C; Future  -     CBC Auto Differential; Future  -     Comprehensive Metabolic Panel; Future  -     HIV 1/2 Ag/Ab (4th Gen); Future  -     Hepatitis C Antibody; Future  -     TSH; Future  -     Lipid Panel; Future    Encounter for screening mammogram for malignant neoplasm of breast  -     Mammo Digital Screening Bilat; Future    Benign neuroendocrine neoplasm of rectum  -     Ambulatory referral/consult to Gastroenterology; Future    Current mild episode of major depressive disorder, unspecified whether recurrent  -     buPROPion (WELLBUTRIN SR) 150 MG TBSR 12 hr tablet; Take 1 tab daily    Order CBC, metabolic panel including electrolytes, liver function, and renal function tests Order mammogram Initiate bupropion (Wellbutrin) daily for depression treatment Refer to gastroenterology clinic for follow-up on previous colonoscopy findings     Follow-up: Follow-up in 6-8 weeks to assess response to bupropion and adjust dosage if needed     This note was generated with the assistance of ambient listening technology. Verbal consent was obtained by the  patient and accompanying visitor(s) for the recording of patient appointment to facilitate this note. I attest to having reviewed and edited the generated note for accuracy, though some syntax or spelling errors may persist. Please contact the author of this note for any clarification.      Francisco Bermudez MD  [facility]             [1]   Social History  Tobacco Use    Smoking status: Never    Smokeless tobacco: Never   Substance Use Topics    Alcohol use: No    Drug use: No

## 2025-04-15 RX ORDER — BUPROPION HYDROCHLORIDE 150 MG/1
TABLET, EXTENDED RELEASE ORAL
Qty: 90 TABLET | Refills: 0 | Status: SHIPPED | OUTPATIENT
Start: 2025-04-15

## 2025-04-22 ENCOUNTER — TELEPHONE (OUTPATIENT)
Dept: OBSTETRICS AND GYNECOLOGY | Facility: CLINIC | Age: 45
End: 2025-04-22
Payer: COMMERCIAL

## 2025-05-13 ENCOUNTER — HOSPITAL ENCOUNTER (OUTPATIENT)
Dept: RADIOLOGY | Facility: OTHER | Age: 45
Discharge: HOME | End: 2025-05-13
Payer: COMMERCIAL

## 2025-05-13 DIAGNOSIS — Z12.31 ENCOUNTER FOR SCREENING MAMMOGRAM FOR MALIGNANT NEOPLASM OF BREAST: ICD-10-CM

## 2025-05-13 PROCEDURE — 77063 BREAST TOMOSYNTHESIS BI: CPT | Mod: 26,,, | Performed by: RADIOLOGY

## 2025-05-13 PROCEDURE — 77063 BREAST TOMOSYNTHESIS BI: CPT | Mod: TC

## 2025-05-13 PROCEDURE — 77067 SCR MAMMO BI INCL CAD: CPT | Mod: 26,,, | Performed by: RADIOLOGY

## 2025-05-20 ENCOUNTER — TELEPHONE (OUTPATIENT)
Dept: GASTROENTEROLOGY | Facility: CLINIC | Age: 45
End: 2025-05-20
Payer: COMMERCIAL

## 2025-05-30 ENCOUNTER — OFFICE VISIT (OUTPATIENT)
Dept: OBSTETRICS AND GYNECOLOGY | Facility: CLINIC | Age: 45
End: 2025-05-30
Payer: COMMERCIAL

## 2025-05-30 VITALS
WEIGHT: 144.19 LBS | BODY MASS INDEX: 23.27 KG/M2 | SYSTOLIC BLOOD PRESSURE: 124 MMHG | DIASTOLIC BLOOD PRESSURE: 82 MMHG | HEART RATE: 60 BPM

## 2025-05-30 DIAGNOSIS — R53.83 FATIGUE, UNSPECIFIED TYPE: ICD-10-CM

## 2025-05-30 DIAGNOSIS — N95.1 INSOMNIA ASSOCIATED WITH MENOPAUSE: ICD-10-CM

## 2025-05-30 DIAGNOSIS — N89.8 VAGINAL DRYNESS: Primary | ICD-10-CM

## 2025-05-30 DIAGNOSIS — N95.1 PERIMENOPAUSAL VASOMOTOR SYMPTOMS: ICD-10-CM

## 2025-05-30 PROCEDURE — 99999 PR PBB SHADOW E&M-EST. PATIENT-LVL III: CPT | Mod: PBBFAC,,, | Performed by: OBSTETRICS & GYNECOLOGY

## 2025-05-30 NOTE — PROGRESS NOTES
[facility] Hormone Consult Preparation Sheet    Patient Name: Kathe Hudson 45 y.o. perimenopausal woman who presents for a hormone consult.  Menses are once a month and last 7 days. She states that she has PMS a week before her menses.  She states that she started having hot flashes and night sweats 1 year ago.  She also has vaginal dryness and pian with intercourse.  She states that she has used lubricants in the apst.  She also reports decreased desire.  She has sleep disturbances.  She goes to bed at 9:30PM and wakes up at 4:30AM.  She typically does not have trouble falling asleep but does have trouble styaing asleep.  She denies snoring.  She admits to using electronic devices before and in bed.  She has daytime fatigue and takes naps on the weekend.  She reports brain fog which she describes as forgetfulness, difficulty concentrating and trouble with word recall.  She has anxiety, depression and irritability.  She was recently started on an anti-depressant 4 weeks ago.  She reports no weight gain but more of a weight distribution in the abdomen.  She does work out.  She does weight lifting 4-5 times a week with some cardio.  She eats lean protein with each meal, fruit and vegetables.  She does report shoulder and bilateral knee pain.  No workup by PCP or XR.    Provider: Julieth Perez  CC and Symptoms:   Chief Complaint   Patient presents with    Hormone Consultation       Patient History:  Problem List[1]    Patient Medications:  Current Outpatient Medications on File Prior to Visit   Medication Sig    buPROPion (WELLBUTRIN SR) 150 MG TBSR 12 hr tablet Take 1 tab daily    TAZORAC 0.1 % cream Apply topically as needed.    acetaminophen (TYLENOL) 325 MG tablet Take 325 mg by mouth every 6 (six) hours as needed for Pain.    albuterol (PROVENTIL HFA) 90 mcg/actuation inhaler Inhale 2 puffs into the lungs every 6 (six) hours as needed for Wheezing. Rescue    cetirizine (ZYRTEC) 10  "MG tablet Take 1 tablet (10 mg total) by mouth once daily.    diclofenac sodium (VOLTAREN) 1 % Gel Apply 2 g topically 3 (three) times daily.    fluticasone propionate (FLONASE) 50 mcg/actuation nasal spray 1 spray (50 mcg total) by Each Nostril route once daily.    ipratropium (ATROVENT) 42 mcg (0.06 %) nasal spray 2 sprays by Nasal route 4 (four) times daily.    LATISSE 0.03 % ophthalmic solution Apply 1 drop via applicator once a day    omeprazole (PRILOSEC) 40 MG capsule Take 1 capsule (40 mg total) by mouth once daily.    promethazine-dextromethorphan (PROMETHAZINE-DM) 6.25-15 mg/5 mL Syrp Take 5 mLs by mouth nightly as needed (for cough. Do not take with any other cough syrups. Can make you drowsy.).     No current facility-administered medications on file prior to visit.       Patient Imaging and Procedures  LMP: Patient's last menstrual period was 05/30/2025 (exact date).  PAP: 7/10/2024  HPV: No results found for: "HPV"  Mammo: 5/2025 WNL  Colonoscopy: Last Colonoscopy completed on 10/25/2021  BMI: Body mass index is 23.27 kg/m².    Recent Labs:  Estradiol: No results found for: "ESTRADIOL"  Testosterone: No results found for: "TESTOSTERONE"  FSH: No results found for: "FSH"  CBC:   Lab Results   Component Value Date    WBC 3.70 (L) 06/06/2022    HGB 14.6 06/06/2022    HCT 44.4 06/06/2022    MCV 94 06/06/2022     06/06/2022       CMP:  Sodium   Date Value Ref Range Status   06/06/2022 140 136 - 145 mmol/L Final     Potassium   Date Value Ref Range Status   06/06/2022 4.5 3.5 - 5.1 mmol/L Final     Chloride   Date Value Ref Range Status   06/06/2022 107 95 - 110 mmol/L Final     CO2   Date Value Ref Range Status   06/06/2022 24 23 - 29 mmol/L Final     Glucose   Date Value Ref Range Status   06/06/2022 73 70 - 110 mg/dL Final     BUN   Date Value Ref Range Status   06/06/2022 11 6 - 20 mg/dL Final     Creatinine   Date Value Ref Range Status   06/06/2022 0.9 0.5 - 1.4 mg/dL Final     Calcium   Date " Value Ref Range Status   06/06/2022 10.2 8.7 - 10.5 mg/dL Final     Total Protein   Date Value Ref Range Status   06/06/2022 8.1 6.0 - 8.4 g/dL Final     Albumin   Date Value Ref Range Status   06/06/2022 4.2 3.5 - 5.2 g/dL Final     Total Bilirubin   Date Value Ref Range Status   06/06/2022 0.4 0.1 - 1.0 mg/dL Final     Comment:     For infants and newborns, interpretation of results should be based  on gestational age, weight and in agreement with clinical  observations.    Premature Infant recommended reference ranges:  Up to 24 hours.............<8.0 mg/dL  Up to 48 hours............<12.0 mg/dL  3-5 days..................<15.0 mg/dL  6-29 days.................<15.0 mg/dL       Alkaline Phosphatase   Date Value Ref Range Status   06/06/2022 42 (L) 55 - 135 U/L Final     AST   Date Value Ref Range Status   06/06/2022 21 10 - 40 U/L Final     ALT   Date Value Ref Range Status   06/06/2022 14 10 - 44 U/L Final     Anion Gap   Date Value Ref Range Status   06/06/2022 9 8 - 16 mmol/L Final     eGFR if    Date Value Ref Range Status   06/06/2022 >60.0 >60 mL/min/1.73 m^2 Final     eGFR if non    Date Value Ref Range Status   06/06/2022 >60.0 >60 mL/min/1.73 m^2 Final     Comment:     Calculation used to obtain the estimated glomerular filtration  rate (eGFR) is the CKD-EPI equation.        Lipids:   Cholesterol   Date Value Ref Range Status   05/30/2022 180 120 - 199 mg/dL Final     Comment:     The National Cholesterol Education Program (NCEP) has set the  following guidelines (reference ranges) for Cholesterol:  Optimal.....................<200 mg/dL  Borderline High.............200-239 mg/dL  High........................> or = 240 mg/dL       Triglycerides   Date Value Ref Range Status   05/30/2022 89 30 - 150 mg/dL Final     Comment:     The National Cholesterol Education Program (NCEP) has set the  following guidelines (reference values) for  triglycerides:  Normal......................<150 mg/dL  Borderline High.............150-199 mg/dL  High........................200-499 mg/dL       HDL   Date Value Ref Range Status   05/30/2022 60 40 - 75 mg/dL Final     Comment:     The National Cholesterol Education Program (NCEP) has set the  following guidelines (reference values) for HDL Cholesterol:  Low...............<40 mg/dL  Optimal...........>60 mg/dL       LDL Cholesterol   Date Value Ref Range Status   05/30/2022 102.2 63.0 - 159.0 mg/dL Final     Comment:     The National Cholesterol Education Program (NCEP) has set the  following guidelines (reference values) for LDL Cholesterol:  Optimal.......................<130 mg/dL  Borderline High...............130-159 mg/dL  High..........................160-189 mg/dL  Very High.....................>190 mg/dL       HDL/Cholesterol Ratio   Date Value Ref Range Status   05/30/2022 33.3 20.0 - 50.0 % Final     Total Cholesterol/HDL Ratio   Date Value Ref Range Status   05/30/2022 3.0 2.0 - 5.0 Final     Non-HDL Cholesterol   Date Value Ref Range Status   05/30/2022 120 mg/dL Final     Comment:     Risk category and Non-HDL cholesterol goals:  Coronary heart disease (CHD)or equivalent (10-year risk of CHD >20%):  Non-HDL cholesterol goal     <130 mg/dL  Two or more CHD risk factors and 10-year risk of CHD <= 20%:  Non-HDL cholesterol goal     <160 mg/dL  0 to 1 CHD risk factor:  Non-HDL cholesterol goal     <190 mg/dL       A1C:   Lab Results   Component Value Date    HGBA1C 4.9 05/30/2022         Assessment & Plan   Vasomotor Symptoms   ? Discussed estrogen therapy for hot flashes: delivery methods (oral, transdermal, topical), risks (e.g., VTE, breast cancer, stroke), and benefits (relief of vasomotor symptoms, bone health).   ASCVD risk 0.3%  ? Rx: Divigel 0.5mg qD tailored to patient preference and risk profile.   ? Reviewed hysterectomy status to determine need for progesterone.   ? Discussed progesterone's  dual role in maintaining uterine lining (if uterus intact) and addressing night sweats/sleep issues. Stressed the importance of consistent use to avoid breakthrough symptoms or endometrial issues.   Rx Promterium 200mg qHS    Vaginal Dryness   ? Emphasized benefits of local vaginal estrogen for genitourinary syndrome of menopause (GSM)   ? Provided instructions for application (e.g., cream, ring, or tablet) and stressed consistent use for optimal results.   Rx Estrace qD x 2 weeks then twice a week    Libido   ? to be discussed at follow up visit    Sleep   ? Provided education on sleep hygiene: consistent sleep/wake times, limiting caffeine/alcohol, and avoiding screens before bed.   ? Encouraged stimulus control (bed for sleep and sex only) and relaxation techniques   ? Highlighted the impact of poor sleep on physical and mental health, including mood, memory, and overall quality of life.   Recommend magnesium glycinate 500mg 1 hour before bedtime    Exercise   ? Reinforced importance of 150 minutes of moderate aerobic activity per week and resistance training twice weekly to counteract bone loss and maintain lean muscle mass.   ? Discussed the accelerated loss of bone and muscle during gema- and post menopause and its role in reducing fracture risk and preserving physical function.       Diet   ? Reviewed protein intake recommendation (at least 0.8g/kg/day) for muscle maintenance, with a focus on high-quality protein sources   ? Recommend 1200mg calcium and 800-1000IU vitamin D daily to support bone health.   ? Encouraged hydration (at least 2L/day) to improve fatigue, cognition, and overall metabolic function.   ? Suggested a Mediterranean-style diet for cardiometabolic health, with an emphasis on whole grains, healthy fats, fruits, and vegetables.     Additional Recommendations   ? Discussed routine follow-up to assess symptom management and adjust hormone therapy or lifestyle interventions as needed.      Follow up in 3 months       [1]   Patient Active Problem List  Diagnosis    Benign neuroendocrine neoplasm of rectum    Nonspecific abnormal electrocardiogram (ECG) (EKG)    Patellar tendon strain, right, initial encounter    Bilateral shoulder pain    Rotator cuff impingement syndrome of left shoulder    Tear of right supraspinatus tendon    Muscle weakness of extremity

## 2025-06-10 ENCOUNTER — PATIENT MESSAGE (OUTPATIENT)
Dept: OBSTETRICS AND GYNECOLOGY | Facility: CLINIC | Age: 45
End: 2025-06-10
Payer: COMMERCIAL

## 2025-06-11 DIAGNOSIS — N95.1 INSOMNIA ASSOCIATED WITH MENOPAUSE: ICD-10-CM

## 2025-06-11 DIAGNOSIS — R53.83 FATIGUE, UNSPECIFIED TYPE: ICD-10-CM

## 2025-06-11 DIAGNOSIS — N89.8 VAGINAL DRYNESS: ICD-10-CM

## 2025-06-11 DIAGNOSIS — N95.1 PERIMENOPAUSAL VASOMOTOR SYMPTOMS: Primary | ICD-10-CM

## 2025-06-11 RX ORDER — PROGESTERONE 200 MG/1
400 CAPSULE ORAL DAILY
Qty: 20 CAPSULE | Refills: 0 | Status: SHIPPED | OUTPATIENT
Start: 2025-06-11 | End: 2025-06-21

## 2025-06-11 RX ORDER — ESTRADIOL 0.5 MG/.5G
1 GEL TOPICAL DAILY
Qty: 30 PACKET | Refills: 12 | Status: SHIPPED | OUTPATIENT
Start: 2025-06-11 | End: 2026-06-11

## 2025-06-11 RX ORDER — ESTRADIOL 0.1 MG/G
1 CREAM VAGINAL
Qty: 42 G | Refills: 4 | Status: SHIPPED | OUTPATIENT
Start: 2025-06-12 | End: 2026-06-12

## 2025-06-23 ENCOUNTER — LAB VISIT (OUTPATIENT)
Dept: LAB | Facility: OTHER | Age: 45
End: 2025-06-23
Payer: COMMERCIAL

## 2025-06-23 ENCOUNTER — OFFICE VISIT (OUTPATIENT)
Dept: INTERNAL MEDICINE | Facility: CLINIC | Age: 45
End: 2025-06-23
Payer: COMMERCIAL

## 2025-06-23 VITALS
OXYGEN SATURATION: 99 % | BODY MASS INDEX: 22.49 KG/M2 | WEIGHT: 143.31 LBS | HEART RATE: 70 BPM | HEIGHT: 67 IN | SYSTOLIC BLOOD PRESSURE: 100 MMHG | DIASTOLIC BLOOD PRESSURE: 58 MMHG

## 2025-06-23 DIAGNOSIS — Z00.00 ANNUAL PHYSICAL EXAM: ICD-10-CM

## 2025-06-23 DIAGNOSIS — Z00.00 ANNUAL PHYSICAL EXAM: Primary | ICD-10-CM

## 2025-06-23 DIAGNOSIS — R00.2 HEART PALPITATIONS: ICD-10-CM

## 2025-06-23 DIAGNOSIS — R53.83 FATIGUE, UNSPECIFIED TYPE: ICD-10-CM

## 2025-06-23 DIAGNOSIS — E55.9 VITAMIN D DEFICIENCY: ICD-10-CM

## 2025-06-23 LAB
25(OH)D3+25(OH)D2 SERPL-MCNC: 40 NG/ML (ref 30–96)
ABSOLUTE EOSINOPHIL (OHS): 0.05 K/UL
ABSOLUTE MONOCYTE (OHS): 0.39 K/UL (ref 0.3–1)
ABSOLUTE NEUTROPHIL COUNT (OHS): 1.81 K/UL (ref 1.8–7.7)
ALBUMIN SERPL BCP-MCNC: 4.1 G/DL (ref 3.5–5.2)
ALP SERPL-CCNC: 47 UNIT/L (ref 40–150)
ALT SERPL W/O P-5'-P-CCNC: 16 UNIT/L (ref 10–44)
ANION GAP (OHS): 11 MMOL/L (ref 8–16)
AST SERPL-CCNC: 19 UNIT/L (ref 11–45)
BASOPHILS # BLD AUTO: 0.02 K/UL
BASOPHILS NFR BLD AUTO: 0.5 %
BILIRUB SERPL-MCNC: 0.4 MG/DL (ref 0.1–1)
BUN SERPL-MCNC: 10 MG/DL (ref 6–20)
CALCIUM SERPL-MCNC: 10 MG/DL (ref 8.7–10.5)
CHLORIDE SERPL-SCNC: 105 MMOL/L (ref 95–110)
CHOLEST SERPL-MCNC: 164 MG/DL (ref 120–199)
CHOLEST/HDLC SERPL: 3.3 {RATIO} (ref 2–5)
CO2 SERPL-SCNC: 25 MMOL/L (ref 23–29)
CREAT SERPL-MCNC: 1.1 MG/DL (ref 0.5–1.4)
EAG (OHS): 88 MG/DL (ref 68–131)
ERYTHROCYTE [DISTWIDTH] IN BLOOD BY AUTOMATED COUNT: 13 % (ref 11.5–14.5)
FERRITIN SERPL-MCNC: 31 NG/ML (ref 20–300)
GFR SERPLBLD CREATININE-BSD FMLA CKD-EPI: >60 ML/MIN/1.73/M2
GLUCOSE SERPL-MCNC: 72 MG/DL (ref 70–110)
HBA1C MFR BLD: 4.7 % (ref 4–5.6)
HCT VFR BLD AUTO: 44.1 % (ref 37–48.5)
HCV AB SERPL QL IA: NEGATIVE
HDLC SERPL-MCNC: 49 MG/DL (ref 40–75)
HDLC SERPL: 29.9 % (ref 20–50)
HGB BLD-MCNC: 14.2 GM/DL (ref 12–16)
HIV 1+2 AB+HIV1 P24 AG SERPL QL IA: NEGATIVE
IMM GRANULOCYTES # BLD AUTO: 0 K/UL (ref 0–0.04)
IMM GRANULOCYTES NFR BLD AUTO: 0 % (ref 0–0.5)
IRON SATN MFR SERPL: 36 % (ref 20–50)
IRON SERPL-MCNC: 154 UG/DL (ref 30–160)
LDLC SERPL CALC-MCNC: 97.4 MG/DL (ref 63–159)
LYMPHOCYTES # BLD AUTO: 1.44 K/UL (ref 1–4.8)
MCH RBC QN AUTO: 30.5 PG (ref 27–31)
MCHC RBC AUTO-ENTMCNC: 32.2 G/DL (ref 32–36)
MCV RBC AUTO: 95 FL (ref 82–98)
NONHDLC SERPL-MCNC: 115 MG/DL
NUCLEATED RBC (/100WBC) (OHS): 0 /100 WBC
PLATELET # BLD AUTO: 195 K/UL (ref 150–450)
PMV BLD AUTO: 11.2 FL (ref 9.2–12.9)
POTASSIUM SERPL-SCNC: 4.3 MMOL/L (ref 3.5–5.1)
PROT SERPL-MCNC: 7.8 GM/DL (ref 6–8.4)
RBC # BLD AUTO: 4.65 M/UL (ref 4–5.4)
RELATIVE EOSINOPHIL (OHS): 1.3 %
RELATIVE LYMPHOCYTE (OHS): 38.8 % (ref 18–48)
RELATIVE MONOCYTE (OHS): 10.5 % (ref 4–15)
RELATIVE NEUTROPHIL (OHS): 48.9 % (ref 38–73)
SODIUM SERPL-SCNC: 141 MMOL/L (ref 136–145)
TIBC SERPL-MCNC: 431 UG/DL (ref 250–450)
TRANSFERRIN SERPL-MCNC: 291 MG/DL (ref 200–375)
TRIGL SERPL-MCNC: 88 MG/DL (ref 30–150)
TSH SERPL-ACNC: 1.52 UIU/ML (ref 0.4–4)
WBC # BLD AUTO: 3.71 K/UL (ref 3.9–12.7)

## 2025-06-23 PROCEDURE — 36415 COLL VENOUS BLD VENIPUNCTURE: CPT

## 2025-06-23 PROCEDURE — 86803 HEPATITIS C AB TEST: CPT

## 2025-06-23 PROCEDURE — 85025 COMPLETE CBC W/AUTO DIFF WBC: CPT

## 2025-06-23 PROCEDURE — 84443 ASSAY THYROID STIM HORMONE: CPT

## 2025-06-23 PROCEDURE — 87389 HIV-1 AG W/HIV-1&-2 AB AG IA: CPT

## 2025-06-23 PROCEDURE — 3078F DIAST BP <80 MM HG: CPT | Mod: CPTII,S$GLB,,

## 2025-06-23 PROCEDURE — 1159F MED LIST DOCD IN RCRD: CPT | Mod: CPTII,S$GLB,,

## 2025-06-23 PROCEDURE — 80061 LIPID PANEL: CPT

## 2025-06-23 PROCEDURE — 3044F HG A1C LEVEL LT 7.0%: CPT | Mod: CPTII,S$GLB,,

## 2025-06-23 PROCEDURE — 84450 TRANSFERASE (AST) (SGOT): CPT

## 2025-06-23 PROCEDURE — 3008F BODY MASS INDEX DOCD: CPT | Mod: CPTII,S$GLB,,

## 2025-06-23 PROCEDURE — 99213 OFFICE O/P EST LOW 20 MIN: CPT | Mod: S$GLB,,,

## 2025-06-23 PROCEDURE — 84466 ASSAY OF TRANSFERRIN: CPT

## 2025-06-23 PROCEDURE — 99999 PR PBB SHADOW E&M-EST. PATIENT-LVL IV: CPT | Mod: PBBFAC,,,

## 2025-06-23 PROCEDURE — 82728 ASSAY OF FERRITIN: CPT

## 2025-06-23 PROCEDURE — 3074F SYST BP LT 130 MM HG: CPT | Mod: CPTII,S$GLB,,

## 2025-06-23 PROCEDURE — 83036 HEMOGLOBIN GLYCOSYLATED A1C: CPT

## 2025-06-23 PROCEDURE — 82306 VITAMIN D 25 HYDROXY: CPT

## 2025-06-23 PROCEDURE — 1160F RVW MEDS BY RX/DR IN RCRD: CPT | Mod: CPTII,S$GLB,,

## 2025-06-25 ENCOUNTER — HOSPITAL ENCOUNTER (OUTPATIENT)
Dept: CARDIOLOGY | Facility: OTHER | Age: 45
Discharge: HOME | End: 2025-06-25
Payer: COMMERCIAL

## 2025-06-25 ENCOUNTER — RESULTS FOLLOW-UP (OUTPATIENT)
Dept: INTERNAL MEDICINE | Facility: CLINIC | Age: 45
End: 2025-06-25

## 2025-06-25 DIAGNOSIS — R00.2 HEART PALPITATIONS: ICD-10-CM

## 2025-06-25 LAB
OHS QRS DURATION: 92 MS
OHS QTC CALCULATION: 413 MS

## 2025-06-25 PROCEDURE — 93005 ELECTROCARDIOGRAM TRACING: CPT

## 2025-06-25 PROCEDURE — 93010 ELECTROCARDIOGRAM REPORT: CPT | Mod: ,,, | Performed by: INTERNAL MEDICINE

## 2025-07-07 NOTE — PROGRESS NOTES
[facility]  Progress Note    SUBJECTIVE:     Chief Complaint:   Chief Complaint   Patient presents with    Headache    heart flutter       History of Present Illness:  45 y.o. female who  has a past medical history of Fibroids. presents to clinic today for follow up    #Heart palpitations  She reports experiencing intermittent palpitations/fluttering that started on Saturday, occurring periodically throughout the day without any consistent triggers. Deep breathing provides temporary relief for approximately 10 minutes before symptoms recur. She denies any associated chest pain, shortness of breath, anxiety, or other cardiac-related symptoms. She has no prior history of similar cardiac symptoms.    #MDD  She missed her Bupropion dose on Saturday without significant withdrawal symptoms. She takes wellbutrin with reported benefit and has 10 days of supply remaining. She has been prescribed progesterone and estrogen but has not initiated treatment due to concerns about starting without preliminary bloodwork. She has been taking glucosamine and turmeric supplements for knee strength and pain management for approximately 3 weeks.      Allergies:  Review of patient's allergies indicates:  No Known Allergies    Home Medications:  Current Outpatient Medications on File Prior to Visit   Medication Sig    buPROPion (WELLBUTRIN SR) 150 MG TBSR 12 hr tablet Take 1 tab daily    estradioL (DIVIGEL) 0.5 mg/0.5 gram (0.1 %) GlPk Place 1 application  onto the skin once daily.    estradioL (ESTRACE) 0.01 % (0.1 mg/gram) vaginal cream Place 1 g vaginally twice a week.    progesterone (PROMETRIUM) 200 MG capsule Take 2 capsules (400 mg total) by mouth once daily. for 10 days    TAZORAC 0.1 % cream Apply topically as needed.    acetaminophen (TYLENOL) 325 MG tablet Take 325 mg by mouth every 6 (six) hours as needed for Pain.    albuterol (PROVENTIL HFA) 90 mcg/actuation inhaler Inhale 2 puffs into the lungs every  6 (six) hours as needed for Wheezing. Rescue    cetirizine (ZYRTEC) 10 MG tablet Take 1 tablet (10 mg total) by mouth once daily.    diclofenac sodium (VOLTAREN) 1 % Gel Apply 2 g topically 3 (three) times daily.    fluticasone propionate (FLONASE) 50 mcg/actuation nasal spray 1 spray (50 mcg total) by Each Nostril route once daily.    ipratropium (ATROVENT) 42 mcg (0.06 %) nasal spray 2 sprays by Nasal route 4 (four) times daily.    LATISSE 0.03 % ophthalmic solution Apply 1 drop via applicator once a day    omeprazole (PRILOSEC) 40 MG capsule Take 1 capsule (40 mg total) by mouth once daily.    promethazine-dextromethorphan (PROMETHAZINE-DM) 6.25-15 mg/5 mL Syrp Take 5 mLs by mouth nightly as needed (for cough. Do not take with any other cough syrups. Can make you drowsy.).     No current facility-administered medications on file prior to visit.       Past Medical History:   Diagnosis Date    Fibroids      Past Surgical History:   Procedure Laterality Date    Abdominal myomectomy via mini-laparotomy (2 fibroids  02/20/2018    pelvic pain, fibroids    COLONOSCOPY N/A 10/25/2021    Procedure: COLONOSCOPY, with me;  Surgeon: Sarah Price MD;  Location: [facility] (4TH FLR);  Service: Endoscopy;  Laterality: N/A;  pt completed COVID vaccine- see Immunization record in chart-rb  10/19 lvm to confirm appt-rb  10/20 arrival time confirmed with pt-rb    FLEXIBLE SIGMOIDOSCOPY N/A 11/22/2021    Procedure: SIGMOIDOSCOPY, FLEXIBLE;  Surgeon: Sarah Price MD;  Location: [facility] 2ND FLR;  Service: Colon and Rectal;  Laterality: N/A;    MYOMECTOMY      WISDOM TOOTH EXTRACTION       Family History   Problem Relation Name Age of Onset    Breast cancer Maternal Grandmother Diane         dx age unknown     Colon cancer Maternal Grandmother Diane     Cancer Maternal Grandmother Diane     Stroke Father      Hypertension Father      Ovarian cancer Neg Hx      Uterine cancer Neg Hx      Cervical cancer Neg Hx        Social History[1]       OBJECTIVE:     Vital Signs:  Pulse: 70 (06/23/25 1039)  BP: (!) 100/58 (06/23/25 1039)  SpO2: 99 % (06/23/25 1039)    Physical Exam  Constitutional:       General: She is not in acute distress.     Appearance: Normal appearance. She is not toxic-appearing.   HENT:      Head: Normocephalic and atraumatic.      Right Ear: External ear normal.      Left Ear: External ear normal.      Nose: Nose normal.      Mouth/Throat:      Mouth: Mucous membranes are moist.   Eyes:      Extraocular Movements: Extraocular movements intact.   Cardiovascular:      Rate and Rhythm: Normal rate and regular rhythm.      Pulses: Normal pulses.      Heart sounds: Normal heart sounds.   Pulmonary:      Effort: Pulmonary effort is normal. No respiratory distress.   Abdominal:      General: Abdomen is flat.      Palpations: Abdomen is soft.      Tenderness: There is no abdominal tenderness.   Musculoskeletal:      Cervical back: Normal range of motion and neck supple.   Skin:     General: Skin is warm.      Findings: No bruising or erythema.   Neurological:      General: No focal deficit present.      Mental Status: She is alert.   Psychiatric:         Mood and Affect: Mood normal.         Laboratory:  Hemoglobin A1C   Date Value Ref Range Status   05/30/2022 4.9 4.0 - 5.6 % Final     Comment:     ADA Screening Guidelines:  5.7-6.4%  Consistent with prediabetes  >or=6.5%  Consistent with diabetes    High levels of fetal hemoglobin interfere with the HbA1C  assay. Heterozygous hemoglobin variants (HbS, HgC, etc)do  not significantly interfere with this assay.   However, presence of multiple variants may affect accuracy.       Hemoglobin A1c   Date Value Ref Range Status   06/23/2025 4.7 4.0 - 5.6 % Final     Comment:     ADA Screening Guidelines:  5.7-6.4%  Consistent with prediabetes  >=6.5%  Consistent with diabetes    High levels of fetal hemoglobin interfere with the HbA1C  assay. Heterozygous hemoglobin variants  (HbS, HgC, etc)do  not significantly interfere with this assay.   However, presence of multiple variants may affect accuracy.       A/P:    Kathe was seen today for headache and heart flutter.    Diagnoses and all orders for this visit:    Annual physical exam  -     Lipid Panel; Future  -     Comprehensive Metabolic Panel; Future  -     TSH; Future  -     CBC Auto Differential; Future  -     Hemoglobin A1C; Future  -     HIV 1/2 Ag/Ab (4th Gen); Future  -     Hepatitis C Antibody; Future    Heart palpitations  -     IRON AND TIBC; Future  -     Ferritin; Future  -     Vitamin D; Future  -     EKG 12-lead; Future    Vitamin D deficiency    Fatigue, unspecified type    Considered potential causes for reported heart flutter/palpitations: supplement use, missed antidepressant dose, dehydration, anxiety, and potential electrolyte disturbances.  Determined EKG warranted for further cardiac evaluation.  Evaluated perimenopause symptoms and treatment plan prescribed by gynecologist.  Assessed iron levels due to history of fibroids and potential link to anemia induced palpitations.    Follow up in 1 year for annual or prn    This note was generated with the assistance of ambient listening technology. Verbal consent was obtained by the patient and accompanying visitor(s) for the recording of patient appointment to facilitate this note. I attest to having reviewed and edited the generated note for accuracy, though some syntax or spelling errors may persist. Please contact the author of this note for any clarification.      Francisco Bermudez MD   Family Medicine   [facility]           [1]   Social History  Tobacco Use    Smoking status: Never     Passive exposure: Never    Smokeless tobacco: Never   Substance Use Topics    Alcohol use: No    Drug use: No

## 2025-08-06 ENCOUNTER — PATIENT MESSAGE (OUTPATIENT)
Dept: INTERNAL MEDICINE | Facility: CLINIC | Age: 45
End: 2025-08-06
Payer: COMMERCIAL

## 2025-08-06 DIAGNOSIS — F32.0 CURRENT MILD EPISODE OF MAJOR DEPRESSIVE DISORDER, UNSPECIFIED WHETHER RECURRENT: ICD-10-CM

## 2025-08-06 RX ORDER — BUPROPION HYDROCHLORIDE 150 MG/1
TABLET, EXTENDED RELEASE ORAL
Qty: 90 TABLET | Refills: 3 | Status: SHIPPED | OUTPATIENT
Start: 2025-08-06

## 2025-08-06 NOTE — TELEPHONE ENCOUNTER
Refill Encounter    PCP Visits: Recent Visits  Date Type Provider Dept   06/23/25 Office Visit Francisco Bermudez MD [facility]   04/14/25 Office Visit Francisco Bermudez MD [facility]   Showing recent visits within past 360 days and meeting all other requirements  Future Appointments  No visits were found meeting these conditions.  Showing future appointments within next 720 days and meeting all other requirements      Last 3 Blood Pressure:   BP Readings from Last 3 Encounters:   06/23/25 (!) 100/58   05/30/25 124/82   04/14/25 110/70     Preferred Pharmacy:   [facility] - [place] - [address]  [address] [address]  [place] [address]  Phone: [phone] Fax: [phone]    Requested RX:  Requested Prescriptions     Pending Prescriptions Disp Refills    buPROPion (WELLBUTRIN SR) 150 MG TBSR 12 hr tablet 90 tablet 3     Sig: Take 1 tab daily      RX Route: Normal

## 2025-08-21 ENCOUNTER — TELEPHONE (OUTPATIENT)
Dept: OBSTETRICS AND GYNECOLOGY | Facility: CLINIC | Age: 45
End: 2025-08-21
Payer: COMMERCIAL

## 2025-08-29 ENCOUNTER — TELEPHONE (OUTPATIENT)
Dept: ORTHOPEDICS | Facility: CLINIC | Age: 45
End: 2025-08-29
Payer: COMMERCIAL

## (undated) DEVICE — SUT 1 36IN PDS II VIO MONO

## (undated) DEVICE — DRAPE LAP TIBURON 77X122IN

## (undated) DEVICE — SET TRI-LUMEN FILTERED TUBE

## (undated) DEVICE — BARRIER SEPRAFILM ADHESION
Type: IMPLANTABLE DEVICE | Site: ABDOMEN | Status: NON-FUNCTIONAL
Removed: 2018-02-19

## (undated) DEVICE — SEE MEDLINE ITEM 157117

## (undated) DEVICE — ELECTRODE REM PLYHSV RETURN 9

## (undated) DEVICE — BRIEF MESH LARGE

## (undated) DEVICE — NDL 22GA X1 1/2 REG BEVEL

## (undated) DEVICE — NDL BOX COUNTER

## (undated) DEVICE — PAD ABD 8X10 STERILE

## (undated) DEVICE — SEE MEDLINE ITEM 157181

## (undated) DEVICE — GAUZE SPONGE 4X4 12PLY

## (undated) DEVICE — ELECTRODE NEEDLE 2.8IN

## (undated) DEVICE — DRESSING WND ADH PRIMAPORE 10

## (undated) DEVICE — SYR 10CC LUER LOCK

## (undated) DEVICE — APPLICATOR CHLORAPREP ORN 26ML

## (undated) DEVICE — NDL ELECTRODE EXTENDED 6

## (undated) DEVICE — KIT URINE METER 350ML STAT LOC

## (undated) DEVICE — JELLY KY LUBRICATING 5G PACKET

## (undated) DEVICE — LUBRICANT SURGILUBE 2 OZ

## (undated) DEVICE — PANTIES FEMININE NAPKIN LG/XLG

## (undated) DEVICE — PORT ACCESS 8MM W/120MM LOW

## (undated) DEVICE — TRAY MINOR GEN SURG

## (undated) DEVICE — SUT VICRYL 2-0 36 CT-1

## (undated) DEVICE — WARMER DRAPE STERILE LF

## (undated) DEVICE — GAUZE FLUFF XXLG 36X36 2 PLY

## (undated) DEVICE — SOL BETADINE 5%

## (undated) DEVICE — DRESSING ABSRBNT ISLAND 3.6X8

## (undated) DEVICE — SEE MEDLINE ITEM 153151

## (undated) DEVICE — TRAY DRY SKIN SCRUB PREP

## (undated) DEVICE — SUT PDS II 0 CT VIL MONO 36

## (undated) DEVICE — SUT MONOCRYL 2-0 VIOL 27IN

## (undated) DEVICE — UNDERGLOVE BIOGEL PI SZ 6.5 LF

## (undated) DEVICE — DRAPE CORETEMP FLD WRM 56X62IN

## (undated) DEVICE — SUT 2/0 27IN PDS II VIO MO

## (undated) DEVICE — CLOSURE SKIN STERI STRIP 1/2X4

## (undated) DEVICE — Device

## (undated) DEVICE — SOL PVP-I SCRUB 7.5% 4OZ

## (undated) DEVICE — SOL WATER STRL IRR 1000ML

## (undated) DEVICE — CATH URETHRAL 16FR RED

## (undated) DEVICE — BOWL STERILE LARGE 32OZ

## (undated) DEVICE — SEALER LIGASURE CRV 18.8CM

## (undated) DEVICE — KIT ANTIFOG W/SPONG & FLUID

## (undated) DEVICE — HEMOSTAT VITAGEL RT 4.5

## (undated) DEVICE — DRAPE STERI LONG

## (undated) DEVICE — NDL HYPO REG 25G X 1 1/2

## (undated) DEVICE — SEE MEDLINE ITEM 152622

## (undated) DEVICE — PACK LAPSCP/PELVSCPY III TIBRN

## (undated) DEVICE — NDL SPINAL SPINOCAN 22GX3.5

## (undated) DEVICE — GLOVE BIOGEL SKINSENSE PI 6.0

## (undated) DEVICE — SEE MEDLINE ITEM 154981

## (undated) DEVICE — SUT MCRYL PLUS 4-0 PS2 27IN